# Patient Record
Sex: MALE | Race: OTHER | HISPANIC OR LATINO
[De-identification: names, ages, dates, MRNs, and addresses within clinical notes are randomized per-mention and may not be internally consistent; named-entity substitution may affect disease eponyms.]

---

## 2019-08-19 PROBLEM — Z00.00 ENCOUNTER FOR PREVENTIVE HEALTH EXAMINATION: Status: ACTIVE | Noted: 2019-08-19

## 2019-08-23 ENCOUNTER — APPOINTMENT (OUTPATIENT)
Dept: HEART AND VASCULAR | Facility: CLINIC | Age: 74
End: 2019-08-23
Payer: MEDICARE

## 2019-08-23 ENCOUNTER — LABORATORY RESULT (OUTPATIENT)
Age: 74
End: 2019-08-23

## 2019-08-23 VITALS
HEIGHT: 66 IN | OXYGEN SATURATION: 97 % | TEMPERATURE: 97.8 F | DIASTOLIC BLOOD PRESSURE: 64 MMHG | SYSTOLIC BLOOD PRESSURE: 120 MMHG | WEIGHT: 160.19 LBS | HEART RATE: 70 BPM | BODY MASS INDEX: 25.74 KG/M2

## 2019-08-23 DIAGNOSIS — Z82.49 FAMILY HISTORY OF ISCHEMIC HEART DISEASE AND OTHER DISEASES OF THE CIRCULATORY SYSTEM: ICD-10-CM

## 2019-08-23 DIAGNOSIS — Z80.9 FAMILY HISTORY OF MALIGNANT NEOPLASM, UNSPECIFIED: ICD-10-CM

## 2019-08-23 DIAGNOSIS — Z83.3 FAMILY HISTORY OF DIABETES MELLITUS: ICD-10-CM

## 2019-08-23 DIAGNOSIS — Z83.49 FAMILY HISTORY OF OTHER ENDOCRINE, NUTRITIONAL AND METABOLIC DISEASES: ICD-10-CM

## 2019-08-23 DIAGNOSIS — Z82.3 FAMILY HISTORY OF STROKE: ICD-10-CM

## 2019-08-23 PROCEDURE — 93000 ELECTROCARDIOGRAM COMPLETE: CPT

## 2019-08-23 PROCEDURE — 93306 TTE W/DOPPLER COMPLETE: CPT

## 2019-08-23 PROCEDURE — 36415 COLL VENOUS BLD VENIPUNCTURE: CPT

## 2019-08-23 PROCEDURE — 99204 OFFICE O/P NEW MOD 45 MIN: CPT

## 2019-08-23 NOTE — PHYSICAL EXAM
[General Appearance - Well Developed] : well developed [Normal Appearance] : normal appearance [Well Groomed] : well groomed [General Appearance - Well Nourished] : well nourished [No Deformities] : no deformities [General Appearance - In No Acute Distress] : no acute distress [Normal Conjunctiva] : the conjunctiva exhibited no abnormalities [Eyelids - No Xanthelasma] : the eyelids demonstrated no xanthelasmas [Normal Oral Mucosa] : normal oral mucosa [No Oral Pallor] : no oral pallor [No Oral Cyanosis] : no oral cyanosis [Normal Jugular Venous A Waves Present] : normal jugular venous A waves present [Normal Jugular Venous V Waves Present] : normal jugular venous V waves present [No Jugular Venous Cummins A Waves] : no jugular venous cummins A waves [Heart Rate And Rhythm] : heart rate and rhythm were normal [Murmurs] : no murmurs present [Heart Sounds] : normal S1 and S2 [Respiration, Rhythm And Depth] : normal respiratory rhythm and effort [Exaggerated Use Of Accessory Muscles For Inspiration] : no accessory muscle use [Auscultation Breath Sounds / Voice Sounds] : lungs were clear to auscultation bilaterally [Abdomen Soft] : soft [Abdomen Tenderness] : non-tender [Abdomen Mass (___ Cm)] : no abdominal mass palpated [Abnormal Walk] : normal gait [Nail Clubbing] : no clubbing of the fingernails [Gait - Sufficient For Exercise Testing] : the gait was sufficient for exercise testing [Cyanosis, Localized] : no localized cyanosis [Petechial Hemorrhages (___cm)] : no petechial hemorrhages [Skin Color & Pigmentation] : normal skin color and pigmentation [] : no rash [No Venous Stasis] : no venous stasis [Skin Lesions] : no skin lesions [No Skin Ulcers] : no skin ulcer [No Xanthoma] : no  xanthoma was observed [Mood] : the mood was normal [Affect] : the affect was normal [Oriented To Time, Place, And Person] : oriented to person, place, and time [No Anxiety] : not feeling anxious

## 2019-08-26 LAB
ALBUMIN SERPL ELPH-MCNC: 4.6 G/DL
ALP BLD-CCNC: 64 U/L
ALT SERPL-CCNC: 20 U/L
ANION GAP SERPL CALC-SCNC: 13 MMOL/L
AST SERPL-CCNC: 22 U/L
BASOPHILS # BLD AUTO: 0.05 K/UL
BASOPHILS NFR BLD AUTO: 0.5 %
BILIRUB SERPL-MCNC: 0.2 MG/DL
BUN SERPL-MCNC: 17 MG/DL
CALCIUM SERPL-MCNC: 10 MG/DL
CHLORIDE SERPL-SCNC: 102 MMOL/L
CHOLEST SERPL-MCNC: 200 MG/DL
CHOLEST/HDLC SERPL: 4.7 RATIO
CO2 SERPL-SCNC: 24 MMOL/L
CREAT SERPL-MCNC: 1.05 MG/DL
EOSINOPHIL # BLD AUTO: 0.1 K/UL
EOSINOPHIL NFR BLD AUTO: 1 %
ESTIMATED AVERAGE GLUCOSE: 177 MG/DL
GLUCOSE SERPL-MCNC: 125 MG/DL
HBA1C MFR BLD HPLC: 7.8 %
HCT VFR BLD CALC: 48.8 %
HDLC SERPL-MCNC: 43 MG/DL
HGB BLD-MCNC: 15.6 G/DL
IMM GRANULOCYTES NFR BLD AUTO: 0.3 %
LDLC SERPL CALC-MCNC: 123 MG/DL
LYMPHOCYTES # BLD AUTO: 2.64 K/UL
LYMPHOCYTES NFR BLD AUTO: 27.4 %
MAN DIFF?: NORMAL
MCHC RBC-ENTMCNC: 31.5 PG
MCHC RBC-ENTMCNC: 32 GM/DL
MCV RBC AUTO: 98.6 FL
MONOCYTES # BLD AUTO: 0.79 K/UL
MONOCYTES NFR BLD AUTO: 8.2 %
NEUTROPHILS # BLD AUTO: 6.01 K/UL
NEUTROPHILS NFR BLD AUTO: 62.6 %
PLATELET # BLD AUTO: 263 K/UL
POTASSIUM SERPL-SCNC: 4.5 MMOL/L
PROT SERPL-MCNC: 7.8 G/DL
RBC # BLD: 4.95 M/UL
RBC # FLD: 13.3 %
SODIUM SERPL-SCNC: 139 MMOL/L
TRIGL SERPL-MCNC: 170 MG/DL
WBC # FLD AUTO: 9.62 K/UL

## 2019-08-26 NOTE — HISTORY OF PRESENT ILLNESS
[FreeTextEntry1] : 73 M referred by Dr. Alvarez a week and a half ago was on the bus started sweating then started experiencing indigestion felt better with moon water got home took laura mustafa felt better but since then feels better went to play half court basketball does not feel as good as he usually does had to stop after 15 minutes felt tired \par \par ecg nsr inferior infarct age indeterminate

## 2019-08-26 NOTE — ASSESSMENT
[FreeTextEntry1] : irwin had a recent STEMI evolving inferior wall MI echo done today EF 40% with inferior hypokinesis\par start asa statin plavix low dose metoprolol\par schedule for cath asap \par fu next week

## 2019-08-30 VITALS
DIASTOLIC BLOOD PRESSURE: 81 MMHG | TEMPERATURE: 98 F | OXYGEN SATURATION: 99 % | HEART RATE: 65 BPM | RESPIRATION RATE: 18 BRPM | SYSTOLIC BLOOD PRESSURE: 151 MMHG

## 2019-08-30 RX ORDER — CHLORHEXIDINE GLUCONATE 213 G/1000ML
1 SOLUTION TOPICAL ONCE
Refills: 0 | Status: COMPLETED | OUTPATIENT
Start: 2019-09-04 | End: 2019-09-06

## 2019-08-30 NOTE — H&P ADULT - ADDITIONAL PE
ASA: II  Mallampati : II   12 Lead EKG penidng ASA: II  Mallampati : II   12 Lead EKG: Sinus Bradycardia @ 59 BPM, Left Axis Deviation, TWI III, II, V5, V6

## 2019-08-30 NOTE — H&P ADULT - NSHPSOCIALHISTORY_GEN_ALL_CORE
former smoker; quit 1970s; smoked 1-2 PPD X 24 years  Former drug use; quit 1970s; used marijuana/cocaine/heroine X 5- 10 years  Former ETOH use.

## 2019-08-30 NOTE — H&P ADULT - NSICDXPASTSURGICALHX_GEN_ALL_CORE_FT
PAST SURGICAL HISTORY:  H/O shoulder surgery B/L    H/O total knee replacement, right     S/P cataract surgery

## 2019-08-30 NOTE — H&P ADULT - ASSESSMENT
74 y/o M FORMER SMOKER/FORMER DRUG USE/FORMER ETOH USE with FHx of CAD (MI mother: age 80s) PMH of HTN, DM, asthma (denies any hospitalization/intubation) presents for cardiac catheterization with possible intervention if clinically indicated due to pts risk factors, CCS Angina Class 4 Sx, abnormal EKG and ECHO , pt is referred for cardiac catheterization w/ possible intervention.       Risks & benefits of procedure and alternative therapy have been explained to the patient including but not limited to: allergic reaction, bleeding w/possible need for blood transfusion, infection, renal and vascular compromise, limb damage, arrhythmia, stroke, vessel dissection/perforation, Myocardial infarction, emergent CABG. Informed consent obtained and in chart. 74 y/o M FORMER SMOKER/FORMER DRUG USE/FORMER ETOH USE with FHx of CAD (MI mother: age 80s) PMH of HTN, DM, asthma (denies any hospitalization/intubation) presents for cardiac catheterization with possible intervention if clinically indicated due to pts risk factors, CCS Angina Class 4 Sx, abnormal EKG and ECHO , pt is referred for cardiac catheterization w/ possible intervention.     -Pt took ASA 81 mg this AM and Plavix 75 mg this AM. Has remained compliant with regimen since prescription 8/23/2019.  -NS @ 50 cc/hour.     Risks & benefits of procedure and alternative therapy have been explained to the patient including but not limited to: allergic reaction, bleeding w/possible need for blood transfusion, infection, renal and vascular compromise, limb damage, arrhythmia, stroke, vessel dissection/perforation, Myocardial infarction, emergent CABG. Informed consent obtained and in chart.

## 2019-08-30 NOTE — H&P ADULT - ATTENDING COMMENTS
See PA note written above, for details. I reviewed the PA documentation.  I reviewed vitals, labs, medications, cardiac studies and additional imaging.  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 CTSx consulted. Plan for CABG  Pre op work up ordered  Lisinopril dc'd sunil op  cont BB and high intensity statin  HÉCTOR Martin.  Cardiology Attending

## 2019-08-30 NOTE — H&P ADULT - HISTORY OF PRESENT ILLNESS
SDA Skeleton  *Confirm Medications on Arrival    74 y/o M with PMH of HTN, DM, CVA (.. residual deficits), ? asthma (.. hospitalization/intubation);   who was referred to cardiologist Dr. Santos by PCP for abnormal ECG. Pt. reports feeling…   Pt denies … CP, SOB, dizziness, diaphoresis, palpitations, fatigue, LE edema, orthopnea, PND, syncope.  Subsequently; Pt had a ECHO 08/23/19 revealing EF 40-45 %; basal mid and distal inferior wall hypokinesis. EKG as per MD note revealed inferior infarction.  Due to pts risk factors, CCS Angina Class _ Sx, abnormal EKG and ECHO , pt is referred for cardiac catheterization w/ possible intervention. *Confirm Medications on Arrival    74 y/o M FORMER SMOKER/FORMER DRUG USE/FORMER ETOH USE with FHx of CAD (MI mother: age 80s) PMH of HTN, DM, asthma (denies any hospitalization/intubation);  who was referred to cardiologist Dr. Santos by PCP for abnormal ECG.  Pt. reports that 2 weeks ago; he was in the bus with his wife when he started to sweat profusely; reports of having mid-sternal; non-radiating chest tightness describes it as pulling; he thought he had indigestion and reports feeling better after having seltzer water. His wife was concerned; thus they went to see his PCP who did a EKG and referred the pt. to Dr. Santos; Pt. reports that was the only episode he had so far; denies any CP and SOB at baseline, dizziness, diaphoresis, palpitations, fatigue, LE edema, orthopnea, PND, syncope. He reports that he is very active and works 5 days/week. Subsequently; Pt had a ECHO 08/23/19 revealing EF 40-45 %; basal mid and distal inferior wall hypokinesis. EKG as per MD note revealed inferior infarction.  Due to pts risk factors, CCS Angina Class 4 Sx, abnormal EKG and ECHO , pt is referred for cardiac catheterization w/ possible intervention.   Of note; pt. was initially started on Metoprolol 25 mg QD; Plavix 75 mg daily (first dose 8/23/19) and Atorvastatin 80 mg once a day by DR. Santos last week in the office; 72 y/o M FORMER SMOKER/FORMER DRUG USE/FORMER ETOH USE with FHx of CAD (MI mother: age 80s) PMH of HTN, DM, asthma (denies any hospitalization/intubation);  who was referred to cardiologist Dr. Santos by PCP for abnormal ECG.  Pt. reports that 2 weeks ago; he was in the bus with his wife when he started to sweat profusely; reports of having mid-sternal; non-radiating chest tightness describes it as pulling; he thought he had indigestion and reports feeling better after having seltzer water. His wife was concerned; thus they went to see his PCP who did a EKG and referred the pt. to Dr. Santos; Pt. reports that was the only episode he had so far; denies any CP and SOB at baseline, dizziness, diaphoresis, palpitations, fatigue, LE edema, orthopnea, PND, syncope. He reports that he is very active and works 5 days/week. Subsequently; Pt had a ECHO 08/23/19 revealing EF 40-45 %; basal mid and distal inferior wall hypokinesis. EKG as per MD note revealed inferior infarction.  Due to pts risk factors, CCS Angina Class 4 Sx, abnormal EKG and ECHO , pt is referred for cardiac catheterization w/ possible intervention.   Of note; pt. was initially started on Metoprolol 25 mg QD; Plavix 75 mg daily (first dose 8/23/19) and Atorvastatin 80 mg once a day by DR. Santos last week in the office;

## 2019-09-04 ENCOUNTER — INPATIENT (INPATIENT)
Facility: HOSPITAL | Age: 74
LOS: 6 days | Discharge: HOME HEALTH | DRG: 233 | End: 2019-09-11
Attending: THORACIC SURGERY (CARDIOTHORACIC VASCULAR SURGERY) | Admitting: THORACIC SURGERY (CARDIOTHORACIC VASCULAR SURGERY)
Payer: MEDICARE

## 2019-09-04 DIAGNOSIS — Z98.890 OTHER SPECIFIED POSTPROCEDURAL STATES: Chronic | ICD-10-CM

## 2019-09-04 DIAGNOSIS — Z96.651 PRESENCE OF RIGHT ARTIFICIAL KNEE JOINT: Chronic | ICD-10-CM

## 2019-09-04 DIAGNOSIS — Z98.49 CATARACT EXTRACTION STATUS, UNSPECIFIED EYE: Chronic | ICD-10-CM

## 2019-09-04 DIAGNOSIS — I21.4 NON-ST ELEVATION (NSTEMI) MYOCARDIAL INFARCTION: ICD-10-CM

## 2019-09-04 LAB
ALBUMIN SERPL ELPH-MCNC: 4.1 G/DL — SIGNIFICANT CHANGE UP (ref 3.3–5)
ALP SERPL-CCNC: 63 U/L — SIGNIFICANT CHANGE UP (ref 40–120)
ALT FLD-CCNC: 16 U/L — SIGNIFICANT CHANGE UP (ref 10–45)
ANION GAP SERPL CALC-SCNC: 12 MMOL/L — SIGNIFICANT CHANGE UP (ref 5–17)
ANION GAP SERPL CALC-SCNC: 15 MMOL/L — SIGNIFICANT CHANGE UP (ref 5–17)
APTT BLD: 32.5 SEC — SIGNIFICANT CHANGE UP (ref 27.5–36.3)
AST SERPL-CCNC: 26 U/L — SIGNIFICANT CHANGE UP (ref 10–40)
BASOPHILS # BLD AUTO: 0.02 K/UL — SIGNIFICANT CHANGE UP (ref 0–0.2)
BASOPHILS NFR BLD AUTO: 0.3 % — SIGNIFICANT CHANGE UP (ref 0–2)
BILIRUB SERPL-MCNC: 0.5 MG/DL — SIGNIFICANT CHANGE UP (ref 0.2–1.2)
BUN SERPL-MCNC: 13 MG/DL — SIGNIFICANT CHANGE UP (ref 7–23)
BUN SERPL-MCNC: 14 MG/DL — SIGNIFICANT CHANGE UP (ref 7–23)
CALCIUM SERPL-MCNC: 9.5 MG/DL — SIGNIFICANT CHANGE UP (ref 8.4–10.5)
CALCIUM SERPL-MCNC: 9.8 MG/DL — SIGNIFICANT CHANGE UP (ref 8.4–10.5)
CHLORIDE SERPL-SCNC: 104 MMOL/L — SIGNIFICANT CHANGE UP (ref 96–108)
CHLORIDE SERPL-SCNC: 106 MMOL/L — SIGNIFICANT CHANGE UP (ref 96–108)
CHOLEST SERPL-MCNC: 99 MG/DL — SIGNIFICANT CHANGE UP (ref 10–199)
CK MB CFR SERPL CALC: 2.6 NG/ML — SIGNIFICANT CHANGE UP (ref 0–6.7)
CK SERPL-CCNC: 158 U/L — SIGNIFICANT CHANGE UP (ref 30–200)
CO2 SERPL-SCNC: 22 MMOL/L — SIGNIFICANT CHANGE UP (ref 22–31)
CO2 SERPL-SCNC: 25 MMOL/L — SIGNIFICANT CHANGE UP (ref 22–31)
CREAT SERPL-MCNC: 0.89 MG/DL — SIGNIFICANT CHANGE UP (ref 0.5–1.3)
CREAT SERPL-MCNC: 1.03 MG/DL — SIGNIFICANT CHANGE UP (ref 0.5–1.3)
CRP SERPL-MCNC: 0.8 MG/DL — HIGH (ref 0–0.4)
EOSINOPHIL # BLD AUTO: 0.07 K/UL — SIGNIFICANT CHANGE UP (ref 0–0.5)
EOSINOPHIL NFR BLD AUTO: 0.9 % — SIGNIFICANT CHANGE UP (ref 0–6)
GLUCOSE SERPL-MCNC: 109 MG/DL — HIGH (ref 70–99)
GLUCOSE SERPL-MCNC: 114 MG/DL — HIGH (ref 70–99)
HBA1C BLD-MCNC: 7.3 % — HIGH (ref 4–5.6)
HCT VFR BLD CALC: 42.5 % — SIGNIFICANT CHANGE UP (ref 39–50)
HCT VFR BLD CALC: 46.1 % — SIGNIFICANT CHANGE UP (ref 39–50)
HDLC SERPL-MCNC: 41 MG/DL — SIGNIFICANT CHANGE UP
HGB BLD-MCNC: 14.1 G/DL — SIGNIFICANT CHANGE UP (ref 13–17)
HGB BLD-MCNC: 15.2 G/DL — SIGNIFICANT CHANGE UP (ref 13–17)
IMM GRANULOCYTES NFR BLD AUTO: 0.3 % — SIGNIFICANT CHANGE UP (ref 0–1.5)
INR BLD: 1.2 — HIGH (ref 0.88–1.16)
LIPID PNL WITH DIRECT LDL SERPL: 45 MG/DL — SIGNIFICANT CHANGE UP
LYMPHOCYTES # BLD AUTO: 2.08 K/UL — SIGNIFICANT CHANGE UP (ref 1–3.3)
LYMPHOCYTES # BLD AUTO: 26.7 % — SIGNIFICANT CHANGE UP (ref 13–44)
MAGNESIUM SERPL-MCNC: 1.9 MG/DL — SIGNIFICANT CHANGE UP (ref 1.6–2.6)
MCHC RBC-ENTMCNC: 32 PG — SIGNIFICANT CHANGE UP (ref 27–34)
MCHC RBC-ENTMCNC: 32.1 PG — SIGNIFICANT CHANGE UP (ref 27–34)
MCHC RBC-ENTMCNC: 33 GM/DL — SIGNIFICANT CHANGE UP (ref 32–36)
MCHC RBC-ENTMCNC: 33.2 GM/DL — SIGNIFICANT CHANGE UP (ref 32–36)
MCV RBC AUTO: 96.6 FL — SIGNIFICANT CHANGE UP (ref 80–100)
MCV RBC AUTO: 97.5 FL — SIGNIFICANT CHANGE UP (ref 80–100)
MONOCYTES # BLD AUTO: 0.71 K/UL — SIGNIFICANT CHANGE UP (ref 0–0.9)
MONOCYTES NFR BLD AUTO: 9.1 % — SIGNIFICANT CHANGE UP (ref 2–14)
NEUTROPHILS # BLD AUTO: 4.88 K/UL — SIGNIFICANT CHANGE UP (ref 1.8–7.4)
NEUTROPHILS NFR BLD AUTO: 62.7 % — SIGNIFICANT CHANGE UP (ref 43–77)
NRBC # BLD: 0 /100 WBCS — SIGNIFICANT CHANGE UP (ref 0–0)
NRBC # BLD: 0 /100 WBCS — SIGNIFICANT CHANGE UP (ref 0–0)
PLATELET # BLD AUTO: 169 K/UL — SIGNIFICANT CHANGE UP (ref 150–400)
PLATELET # BLD AUTO: 179 K/UL — SIGNIFICANT CHANGE UP (ref 150–400)
POTASSIUM SERPL-MCNC: 4.2 MMOL/L — SIGNIFICANT CHANGE UP (ref 3.5–5.3)
POTASSIUM SERPL-MCNC: 4.3 MMOL/L — SIGNIFICANT CHANGE UP (ref 3.5–5.3)
POTASSIUM SERPL-SCNC: 4.2 MMOL/L — SIGNIFICANT CHANGE UP (ref 3.5–5.3)
POTASSIUM SERPL-SCNC: 4.3 MMOL/L — SIGNIFICANT CHANGE UP (ref 3.5–5.3)
PROT SERPL-MCNC: 8.2 G/DL — SIGNIFICANT CHANGE UP (ref 6–8.3)
PROTHROM AB SERPL-ACNC: 13.6 SEC — HIGH (ref 10–12.9)
RBC # BLD: 4.4 M/UL — SIGNIFICANT CHANGE UP (ref 4.2–5.8)
RBC # BLD: 4.73 M/UL — SIGNIFICANT CHANGE UP (ref 4.2–5.8)
RBC # FLD: 12.4 % — SIGNIFICANT CHANGE UP (ref 10.3–14.5)
RBC # FLD: 12.5 % — SIGNIFICANT CHANGE UP (ref 10.3–14.5)
SODIUM SERPL-SCNC: 141 MMOL/L — SIGNIFICANT CHANGE UP (ref 135–145)
SODIUM SERPL-SCNC: 143 MMOL/L — SIGNIFICANT CHANGE UP (ref 135–145)
TOTAL CHOLESTEROL/HDL RATIO MEASUREMENT: 2.4 RATIO — LOW (ref 3.4–9.6)
TRIGL SERPL-MCNC: 66 MG/DL — SIGNIFICANT CHANGE UP (ref 10–149)
WBC # BLD: 7.78 K/UL — SIGNIFICANT CHANGE UP (ref 3.8–10.5)
WBC # BLD: 8.59 K/UL — SIGNIFICANT CHANGE UP (ref 3.8–10.5)
WBC # FLD AUTO: 7.78 K/UL — SIGNIFICANT CHANGE UP (ref 3.8–10.5)
WBC # FLD AUTO: 8.59 K/UL — SIGNIFICANT CHANGE UP (ref 3.8–10.5)

## 2019-09-04 PROCEDURE — 93458 L HRT ARTERY/VENTRICLE ANGIO: CPT | Mod: 26

## 2019-09-04 RX ORDER — SODIUM CHLORIDE 9 MG/ML
1000 INJECTION INTRAMUSCULAR; INTRAVENOUS; SUBCUTANEOUS
Refills: 0 | Status: DISCONTINUED | OUTPATIENT
Start: 2019-09-04 | End: 2019-09-06

## 2019-09-04 RX ORDER — ASPIRIN/CALCIUM CARB/MAGNESIUM 324 MG
81 TABLET ORAL DAILY
Refills: 0 | Status: DISCONTINUED | OUTPATIENT
Start: 2019-09-05 | End: 2019-09-06

## 2019-09-04 RX ORDER — DEXTROSE 50 % IN WATER 50 %
25 SYRINGE (ML) INTRAVENOUS ONCE
Refills: 0 | Status: DISCONTINUED | OUTPATIENT
Start: 2019-09-04 | End: 2019-09-06

## 2019-09-04 RX ORDER — ATORVASTATIN CALCIUM 80 MG/1
80 TABLET, FILM COATED ORAL AT BEDTIME
Refills: 0 | Status: DISCONTINUED | OUTPATIENT
Start: 2019-09-04 | End: 2019-09-06

## 2019-09-04 RX ORDER — SODIUM CHLORIDE 9 MG/ML
500 INJECTION INTRAMUSCULAR; INTRAVENOUS; SUBCUTANEOUS
Refills: 0 | Status: DISCONTINUED | OUTPATIENT
Start: 2019-09-04 | End: 2019-09-04

## 2019-09-04 RX ORDER — DEXTROSE 50 % IN WATER 50 %
12.5 SYRINGE (ML) INTRAVENOUS ONCE
Refills: 0 | Status: DISCONTINUED | OUTPATIENT
Start: 2019-09-04 | End: 2019-09-06

## 2019-09-04 RX ORDER — SOD,AMMONIUM,POTASSIUM LACTATE
1 CREAM (GRAM) TOPICAL
Qty: 0 | Refills: 0 | DISCHARGE

## 2019-09-04 RX ORDER — INSULIN LISPRO 100/ML
VIAL (ML) SUBCUTANEOUS
Refills: 0 | Status: DISCONTINUED | OUTPATIENT
Start: 2019-09-04 | End: 2019-09-06

## 2019-09-04 RX ORDER — SODIUM CHLORIDE 9 MG/ML
1000 INJECTION, SOLUTION INTRAVENOUS
Refills: 0 | Status: DISCONTINUED | OUTPATIENT
Start: 2019-09-04 | End: 2019-09-06

## 2019-09-04 RX ORDER — ALBUTEROL 90 UG/1
2 AEROSOL, METERED ORAL
Qty: 0 | Refills: 0 | DISCHARGE

## 2019-09-04 RX ORDER — CLOPIDOGREL BISULFATE 75 MG/1
75 TABLET, FILM COATED ORAL DAILY
Refills: 0 | Status: DISCONTINUED | OUTPATIENT
Start: 2019-09-05 | End: 2019-09-05

## 2019-09-04 RX ORDER — FLUTICASONE PROPIONATE 50 MCG
1 SPRAY, SUSPENSION NASAL
Qty: 0 | Refills: 0 | DISCHARGE

## 2019-09-04 RX ORDER — SOD,AMMONIUM,POTASSIUM LACTATE
1 CREAM (GRAM) TOPICAL
Refills: 0 | Status: DISCONTINUED | OUTPATIENT
Start: 2019-09-04 | End: 2019-09-06

## 2019-09-04 RX ORDER — FLUTICASONE PROPIONATE 50 MCG
1 SPRAY, SUSPENSION NASAL DAILY
Refills: 0 | Status: DISCONTINUED | OUTPATIENT
Start: 2019-09-04 | End: 2019-09-06

## 2019-09-04 RX ORDER — LISINOPRIL 2.5 MG/1
2.5 TABLET ORAL DAILY
Refills: 0 | Status: DISCONTINUED | OUTPATIENT
Start: 2019-09-05 | End: 2019-09-05

## 2019-09-04 RX ORDER — DEXTROSE 50 % IN WATER 50 %
15 SYRINGE (ML) INTRAVENOUS ONCE
Refills: 0 | Status: DISCONTINUED | OUTPATIENT
Start: 2019-09-04 | End: 2019-09-06

## 2019-09-04 RX ORDER — METOPROLOL TARTRATE 50 MG
25 TABLET ORAL DAILY
Refills: 0 | Status: DISCONTINUED | OUTPATIENT
Start: 2019-09-04 | End: 2019-09-06

## 2019-09-04 RX ORDER — GLUCAGON INJECTION, SOLUTION 0.5 MG/.1ML
1 INJECTION, SOLUTION SUBCUTANEOUS ONCE
Refills: 0 | Status: DISCONTINUED | OUTPATIENT
Start: 2019-09-04 | End: 2019-09-06

## 2019-09-04 RX ORDER — ALBUTEROL 90 UG/1
2 AEROSOL, METERED ORAL EVERY 6 HOURS
Refills: 0 | Status: DISCONTINUED | OUTPATIENT
Start: 2019-09-04 | End: 2019-09-06

## 2019-09-04 RX ADMIN — SODIUM CHLORIDE 50 MILLILITER(S): 9 INJECTION INTRAMUSCULAR; INTRAVENOUS; SUBCUTANEOUS at 16:55

## 2019-09-04 RX ADMIN — ATORVASTATIN CALCIUM 80 MILLIGRAM(S): 80 TABLET, FILM COATED ORAL at 21:51

## 2019-09-04 RX ADMIN — Medication 1 APPLICATION(S): at 21:51

## 2019-09-04 RX ADMIN — Medication 2: at 22:07

## 2019-09-05 DIAGNOSIS — I10 ESSENTIAL (PRIMARY) HYPERTENSION: ICD-10-CM

## 2019-09-05 DIAGNOSIS — E11.9 TYPE 2 DIABETES MELLITUS WITHOUT COMPLICATIONS: ICD-10-CM

## 2019-09-05 DIAGNOSIS — R63.8 OTHER SYMPTOMS AND SIGNS CONCERNING FOOD AND FLUID INTAKE: ICD-10-CM

## 2019-09-05 DIAGNOSIS — I25.10 ATHEROSCLEROTIC HEART DISEASE OF NATIVE CORONARY ARTERY WITHOUT ANGINA PECTORIS: ICD-10-CM

## 2019-09-05 DIAGNOSIS — J45.909 UNSPECIFIED ASTHMA, UNCOMPLICATED: ICD-10-CM

## 2019-09-05 LAB
APPEARANCE UR: CLEAR — SIGNIFICANT CHANGE UP
BILIRUB UR-MCNC: NEGATIVE — SIGNIFICANT CHANGE UP
BLD GP AB SCN SERPL QL: NEGATIVE — SIGNIFICANT CHANGE UP
BLD GP AB SCN SERPL QL: NEGATIVE — SIGNIFICANT CHANGE UP
COLOR SPEC: YELLOW — SIGNIFICANT CHANGE UP
DIFF PNL FLD: ABNORMAL
GLUCOSE UR QL: NEGATIVE — SIGNIFICANT CHANGE UP
KETONES UR-MCNC: NEGATIVE — SIGNIFICANT CHANGE UP
LEUKOCYTE ESTERASE UR-ACNC: NEGATIVE — SIGNIFICANT CHANGE UP
NITRITE UR-MCNC: NEGATIVE — SIGNIFICANT CHANGE UP
PCP SPEC-MCNC: SIGNIFICANT CHANGE UP
PH UR: 5.5 — SIGNIFICANT CHANGE UP (ref 5–8)
PROT UR-MCNC: NEGATIVE MG/DL — SIGNIFICANT CHANGE UP
RH IG SCN BLD-IMP: POSITIVE — SIGNIFICANT CHANGE UP
RH IG SCN BLD-IMP: POSITIVE — SIGNIFICANT CHANGE UP
SP GR SPEC: <=1.005 — SIGNIFICANT CHANGE UP (ref 1–1.03)
UROBILINOGEN FLD QL: 0.2 E.U./DL — SIGNIFICANT CHANGE UP

## 2019-09-05 PROCEDURE — 99223 1ST HOSP IP/OBS HIGH 75: CPT

## 2019-09-05 PROCEDURE — 99222 1ST HOSP IP/OBS MODERATE 55: CPT

## 2019-09-05 PROCEDURE — 71045 X-RAY EXAM CHEST 1 VIEW: CPT | Mod: 26

## 2019-09-05 PROCEDURE — 94010 BREATHING CAPACITY TEST: CPT | Mod: 26

## 2019-09-05 PROCEDURE — 93010 ELECTROCARDIOGRAM REPORT: CPT

## 2019-09-05 PROCEDURE — 93880 EXTRACRANIAL BILAT STUDY: CPT | Mod: 26

## 2019-09-05 RX ORDER — CHLORHEXIDINE GLUCONATE 213 G/1000ML
1 SOLUTION TOPICAL ONCE
Refills: 0 | Status: COMPLETED | OUTPATIENT
Start: 2019-09-05 | End: 2019-09-05

## 2019-09-05 RX ORDER — DEXTROSE 50 % IN WATER 50 %
25 SYRINGE (ML) INTRAVENOUS ONCE
Refills: 0 | Status: COMPLETED | OUTPATIENT
Start: 2019-09-05 | End: 2019-09-05

## 2019-09-05 RX ORDER — CHLORHEXIDINE GLUCONATE 213 G/1000ML
30 SOLUTION TOPICAL ONCE
Refills: 0 | Status: COMPLETED | OUTPATIENT
Start: 2019-09-05 | End: 2019-09-06

## 2019-09-05 RX ADMIN — Medication 2: at 22:07

## 2019-09-05 RX ADMIN — Medication 25 MILLIGRAM(S): at 06:06

## 2019-09-05 RX ADMIN — Medication 2: at 11:51

## 2019-09-05 RX ADMIN — Medication 1 APPLICATION(S): at 11:50

## 2019-09-05 RX ADMIN — CLOPIDOGREL BISULFATE 75 MILLIGRAM(S): 75 TABLET, FILM COATED ORAL at 11:49

## 2019-09-05 RX ADMIN — ATORVASTATIN CALCIUM 80 MILLIGRAM(S): 80 TABLET, FILM COATED ORAL at 22:07

## 2019-09-05 RX ADMIN — Medication 81 MILLIGRAM(S): at 11:49

## 2019-09-05 RX ADMIN — Medication 25 MILLILITER(S): at 07:14

## 2019-09-05 RX ADMIN — CHLORHEXIDINE GLUCONATE 1 APPLICATION(S): 213 SOLUTION TOPICAL at 20:30

## 2019-09-05 RX ADMIN — Medication 1 APPLICATION(S): at 22:07

## 2019-09-05 NOTE — PROGRESS NOTE ADULT - PROBLEM SELECTOR PLAN 1
Presented to outpatient physician for episode of chest pressure/discomfort. Abnormal EKG and was referred to Dr. Santos who did an echo showing an EF of 45%. Subsequently was sent to [facility] for cardiac catheterization which showed:  2VD, mild LV hypokinesis, EF 50%, 90% dRCA, 70% RPDA, 30-50% dLM, 90% oLAD, 80% mLAD, 30-50% pLCx. Right radial band.   - CT surgery consulted - recommending CABG   - Planning for CABG tomorrow   - Holding plavix and lisinopril in anticipation for OR tomorrow   - C/w ASA and Toprol 25mg XL  - Pending doppler u/s carotids, PFTs and CXR  - AM EKG

## 2019-09-05 NOTE — PROGRESS NOTE ADULT - ASSESSMENT
72 y/o M FORMER SMOKER/FORMER DRUG USE/FORMER ETOH USE with FHx of CAD (MI mother: age 80s) PMH of HTN, DM, asthma (denies any hospitalization/intubation) presents for cardiac catheterization with possible intervention if clinically indicated due to pts risk factors, CCS Angina Class 4 Sx, abnormal EKG and ECHO , pt is referred for cardiac catheterization w/ possible intervention.     -Pt took ASA 81 mg this AM and Plavix 75 mg this AM. Has remained compliant with regimen since prescription 8/23/2019.  -NS @ 50 cc/hour.     Risks & benefits of procedure and alternative therapy have been explained to the patient including but not limited to: allergic reaction, bleeding w/possible need for blood transfusion, infection, renal and vascular compromise, limb damage, arrhythmia, stroke, vessel dissection/perforation, Myocardial infarction, emergent CABG. Informed consent obtained and in chart.

## 2019-09-05 NOTE — PROGRESS NOTE ADULT - SUBJECTIVE AND OBJECTIVE BOX
Surgeon:    Requesting Physician:    HISTORY OF PRESENT ILLNESS (Need 4):  73y Male  72 y/o M FORMER SMOKER/FORMER DRUG USE/FORMER ETOH USE with FHx of CAD (MI mother: age 80s) PMH of HTN, DM, asthma (denies any hospitalization/intubation);  who was referred to cardiologist Dr. Santos by PCP for abnormal ECG.  Pt. reports that 2 weeks ago; he was in the bus with his wife when he started to sweat profusely; reports of having mid-sternal; non-radiating chest tightness describes it as pulling; he thought he had indigestion and reports feeling better after having seltzer water. His wife was concerned; thus they went to see his PCP who did a EKG and referred the pt. to Dr. Santos; Pt. reports that was the only episode he had so far; denies any CP and SOB at baseline, dizziness, diaphoresis, palpitations, fatigue, LE edema, orthopnea, PND, syncope. He reports that he is very active and works 5 days/week. Subsequently; Pt had a ECHO 08/23/19 revealing EF 40-45 %; basal mid and distal inferior wall hypokinesis. EKG as per MD note revealed inferior infarction.  Due to pts risk factors, CCS Angina Class 4 Sx, abnormal EKG and ECHO , pt is referred for cardiac catheterization w/ possible intervention.   Of note; pt. was initially started on Metoprolol 25 mg QD; Plavix 75 mg daily (first dose 8/23/19) and Atorvastatin 80 mg once a day by DR. Santos last week in the office;    PAST MEDICAL & SURGICAL HISTORY:  S/P cataract surgery  H/O shoulder surgery: B/L  H/O total knee replacement, right      MEDICATIONS  (STANDING):  ammonium lactate 12% Lotion 1 Application(s) Topical two times a day  aspirin enteric coated 81 milliGRAM(s) Oral daily  atorvastatin 80 milliGRAM(s) Oral at bedtime  chlorhexidine 4% Liquid 1 Application(s) Topical once  clopidogrel Tablet 75 milliGRAM(s) Oral daily  dextrose 5%. 1000 milliLiter(s) (50 mL/Hr) IV Continuous <Continuous>  dextrose 50% Injectable 12.5 Gram(s) IV Push once  dextrose 50% Injectable 25 Gram(s) IV Push once  dextrose 50% Injectable 25 Gram(s) IV Push once  fluticasone propionate 50 MICROgram(s)/spray Nasal Spray 1 Spray(s) Both Nostrils daily  insulin lispro (HumaLOG) corrective regimen sliding scale   SubCutaneous Before meals and at bedtime  lisinopril 2.5 milliGRAM(s) Oral daily  metoprolol succinate ER 25 milliGRAM(s) Oral daily  sodium chloride 0.9%. 1000 milliLiter(s) (75 mL/Hr) IV Continuous <Continuous>    MEDICATIONS  (PRN):  ALBUTerol    90 MICROgram(s) HFA Inhaler 2 Puff(s) Inhalation every 6 hours PRN Shortness of Breath and/or Wheezing  dextrose 40% Gel 15 Gram(s) Oral once PRN Blood Glucose LESS THAN 70 milliGRAM(s)/deciliter  glucagon  Injectable 1 milliGRAM(s) IntraMuscular once PRN Glucose LESS THAN 70 milligrams/deciliter      Allergies    No Known Allergies    Intolerances        SOCIAL HISTORY:  Smoker:  YES / NO        PACK YEARS:                         WHEN QUIT?  ETOH use:  YES / NO               FREQUENCY / QUANTITY:  Ilicit Drug use:  YES / NO  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:    FAMILY HISTORY:  FH: myocardial infarction: mother: age 80s      Review of Systems (Need 10):  CONSTITUTIONAL: Denies fevers / chills, sweats, fatigue, weight loss, weight gain                                       NEURO:  Denies parathesias, seizures, syncope, confusion                                                                                  EYES:  Denies blurry vision, discharge, pain, loss of vision                                                                                    ENMT:  Denies difficulty hearing, vertigo, dysphagia, epistaxis, recent dental work                                       CV:  Denies chest pain, palpitations, CABRERA, orthopnea                                                                                           RESPIRATORY:  Denies wWheezing, SOB, cough / sputum, hemoptysis                                                               GI:  Denies nausea, vomiting, diarrhea, constipation, melena                                                                          : Denies hematuria, dysuria, urgency, incontinence                                                                                          MUSKULOSKELETAL:  Denies arthritis, joint swelling, muscle weakness                                                             SKIN/BREAST:  Denies rash, itching, hair loss, masses                                                                                              PSYCH:  Denies depression, anxiety, suicidal ideation                                                                                                HEME/LYMPH:  Denies bruises easily, enlarged lymph nodes, tender lymph nodes                                          ENDOCRINE:  Denies cold intolerance, heat intolerance, polydipsia                                                                      Vital Signs Last 24 Hrs  T(C): 36.4 (05 Sep 2019 06:51), Max: 36.9 (05 Sep 2019 06:07)  T(F): 97.6 (05 Sep 2019 06:51), Max: 98.4 (05 Sep 2019 06:07)  HR: 65 (05 Sep 2019 06:07) (55 - 65)  BP: 150/81 (05 Sep 2019 06:07) (121/68 - 150/81)  BP(mean): --  RR: 18 (05 Sep 2019 06:07) (18 - 18)  SpO2: 95% (05 Sep 2019 06:07) (94% - 98%)    Physical Exam (Need 8)  CONSTITUTIONAL:                                                                          WNL  NEURO:                                                                                             WNL                      EYES:                                                                                                  WNL  ENMT:                                                                                                WNL  CV:                                                                                                      WNL  RESPIRATORY:                                                                                  WNL  GI:                                                                                                       WNL  : BARRAZA + / -                                                                                 WNL  MUSKULOSKELETAL:                                                                       WNL  SKIN / BREAST:                                                                                 WNL                                                          LABS:                        14.1   8.59  )-----------( 169      ( 04 Sep 2019 19:32 )             42.5     09-04    141  |  104  |  13  ----------------------------<  114<H>  4.2   |  25  |  0.89    Ca    9.5      04 Sep 2019 19:32  Mg     1.9     09-04    TPro  8.2  /  Alb  4.1  /  TBili  0.5  /  DBili  <0.2  /  AST  26  /  ALT  16  /  AlkPhos  63  09-04    PT/INR - ( 04 Sep 2019 15:56 )   PT: 13.6 sec;   INR: 1.20          PTT - ( 04 Sep 2019 15:56 )  PTT:32.5 sec    CARDIAC MARKERS ( 04 Sep 2019 15:56 )  x     / x     / 158 U/L / x     / 2.6 ng/mL          RADIOLOGY & ADDITIONAL STUDIES:  CAROTID U/S:    CXR:    CT Scan:    EKG:    TTE / TAMMY:    Cardiac Cath:              s:     Home Medications:   * Patient Currently Takes Medications as of 04-Sep-2019 16:24 documented in Structured Notes  · 	albuterol 90 mcg/inh inhalation aerosol: Last Dose Taken:  , 2 puff(s) inhaled 4 times a day, As Needed  · 	ammonium lactate 12% topical cream: Last Dose Taken:  , Apply topically to affected area 2 times a day, As Needed  · 	fluticasone 27.5 mcg/inh nasal spray: Last Dose Taken:  , 1 spray(s) nasal once a day  · 	metFORMIN 500 mg oral tablet: Last Dose Taken:  , 1 tab(s) orally 2 times a day  · 	lisinopril 2.5 mg oral tablet: Last Dose Taken:  , 1 tab(s) orally once a day  · 	Toprol-XL 25 mg oral tablet, extended release: Last Dose Taken:  , 1 tab(s) orally once a day  · 	Lipitor 80 mg oral tablet: Last Dose Taken:  , 1 tab(s) orally once a day  · 	Aspirin Enteric Coated 81 mg oral delayed release tablet: Last Dose Taken:  , 1 tab(s) orally once a day    Patient History:    Past Medical, Past Surgical, and Family History:  PAST SURGICAL HISTORY:  H/O shoulder surgery B/L    H/O total knee replacement, right     S/P cataract surgery.     FAMILY HISTORY:  FH: myocardial infarction, mother: age 80s.     Social History:  Social History (marital status, living situation, occupation, tobacco use, alcohol and drug use, and sexual history): former smoker; quit 1970s; smoked 1-2 PPD X 24 years  	Former drug use; quit 1970s; used marijuana/cocaine/heroine X 5- 10 years  Former ETOH use.     Tobacco Screening:  · Core Measure Site	No  · Has the patient used tobacco in the past 30 days?	No    Risk Assessment:    Present on Admission:  Deep Venous Thrombosis	no  Pulmonary Embolus	no     Heart Failure:  Does this patient have a history of or has been diagnosed with heart failure? yes.     LV Function Assessment (LVS function was evaluated before arrival and/or during hospitalization) yes.     Is the Ejection Fraction >40% ? no.     moderately reduced LV function.     Are there any contraindications to ACEI/ARB therapy? No.       T/HR/RR/BP:  · Temp (F)	97.7 Degrees F  · Temp (C)	36.5 Degrees C  · Heart Rate	65 /min  · Respiration Rate (breaths/min)	18 /min  · BP Systolic	151 mm Hg  · BP Diastolic	81 mm Hg  · BP Noninvasive Mean	104 mm Hg  · SpO2 (%)	99 %  · O2 delivery	room air     Physical Exam:  · Constitutional	detailed exam  · Constitutional Details	well-developed; well-groomed; well-nourished; no distress  · Eyes	EOMI; PERRL; no drainage or redness  · ENMT	No oral lesions; no gross abnormalities  · Neck	detailed exam  · Neck Details	normal; supple; no JVD  · Breasts	not examined  · Back	not examined  · Respiratory	detailed exam  · Respiratory Details	normal; airway patent; breath sounds equal; good air movement; respirations non-labored; clear to auscultation bilaterally  · Cardiovascular	detailed exam  · Cardiovascular Details	regular rate and rhythm  · Cardiovascular Details	positive S1; positive S2  · Gastrointestinal	Soft, non-tender, no hepatosplenomegaly, normal bowel sounds  · Genitourinary	not examined  · Rectal	not examined  · Extremities	detailed exam  · Extremities Details	normal; no clubbing; no cyanosis; no pedal edema  · Vascular	detailed exam  · Carotid Pulse	right normal; left normal  · Radial Pulse	right normal; left normal  · Femoral Pulse	right normal; left normal  · DP Pulse	1+ b/l  · PT Pulse	Doppler b/l  · Neurological	Alert & oriented; no sensory, motor or coordination deficits, normal reflexes  · Skin	No lesions; no rash  · Lymph Nodes	not examined  · Musculoskeletal	not examined  · Psychiatric	Affect and characteristics of appearance, verbalizations, behaviors are appropriate  · Additional PE	ASA: II  Mallampati : II   12 Lead EKG: Sinus Bradycardia @ 59 BPM, Left Axis Deviation, TWI III, II, V5, V6 Surgeon: Yosef    Requesting Physician:    HISTORY OF PRESENT ILLNESS:    73 year old male, former smoker, fomer drug and ETOH abuse, with FHx of CAD (MI mother: age 80s), PMH of HTN, DM2, Asthma (denies any hospitalization/intubation);  who was referred to cardiologist Dr. Santos by PCP for abnormal ECG.  Patient reports that 2 weeks ago he was in the bus with his wife when he started to sweat profusely with associated mid-sternal; non-radiating chest tightness he describes it as pulling. He thought he had indigestion and reports feeling better after having seltzer water. His wife was concerned; thus they went to see his PCP who did a EKG and referred the pt. to Dr. Santos. Patient reports that was the only episode he had so far; denies any CP and SOB at baseline, dizziness, diaphoresis, palpitations, fatigue, LE edema, orthopnea, PND, syncope. He reports that he is very active and works 5 days/week. Subsequently; Pt had a ECHO 08/23/19 revealing EF 40-45 %; basal mid and distal inferior wall hypokinesis. EKG as per MD note revealed inferior infarction.  Due to pts risk factors, CCS Angina Class 4 Sx, abnormal EKG and ECHO , pt is referred for cardiac catheterization w/ possible intervention.   Of note; pt. was initially started on Metoprolol 25 mg QD; Plavix 75 mg daily (first dose 8/23/19) and Atorvastatin 80 mg once a day by DR. Santos last week in the office;    PAST MEDICAL & SURGICAL HISTORY:  S/P cataract surgery  H/O shoulder surgery: B/L  H/O total knee replacement, right      MEDICATIONS  (STANDING):  ammonium lactate 12% Lotion 1 Application(s) Topical two times a day  aspirin enteric coated 81 milliGRAM(s) Oral daily  atorvastatin 80 milliGRAM(s) Oral at bedtime  chlorhexidine 4% Liquid 1 Application(s) Topical once  clopidogrel Tablet 75 milliGRAM(s) Oral daily  dextrose 5%. 1000 milliLiter(s) (50 mL/Hr) IV Continuous <Continuous>  dextrose 50% Injectable 12.5 Gram(s) IV Push once  dextrose 50% Injectable 25 Gram(s) IV Push once  dextrose 50% Injectable 25 Gram(s) IV Push once  fluticasone propionate 50 MICROgram(s)/spray Nasal Spray 1 Spray(s) Both Nostrils daily  insulin lispro (HumaLOG) corrective regimen sliding scale   SubCutaneous Before meals and at bedtime  lisinopril 2.5 milliGRAM(s) Oral daily  metoprolol succinate ER 25 milliGRAM(s) Oral daily  sodium chloride 0.9%. 1000 milliLiter(s) (75 mL/Hr) IV Continuous <Continuous>    MEDICATIONS  (PRN):  ALBUTerol    90 MICROgram(s) HFA Inhaler 2 Puff(s) Inhalation every 6 hours PRN Shortness of Breath and/or Wheezing  dextrose 40% Gel 15 Gram(s) Oral once PRN Blood Glucose LESS THAN 70 milliGRAM(s)/deciliter  glucagon  Injectable 1 milliGRAM(s) IntraMuscular once PRN Glucose LESS THAN 70 milligrams/deciliter      Allergies    No Known Allergies    Intolerances        SOCIAL HISTORY:  Smoker:  YES / NO        PACK YEARS:                         WHEN QUIT?  ETOH use:  YES / NO               FREQUENCY / QUANTITY:  Ilicit Drug use:  YES / NO  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:    FAMILY HISTORY:  FH: myocardial infarction: mother: age 80s      Review of Systems (Need 10):  CONSTITUTIONAL: Denies fevers / chills, sweats, fatigue, weight loss, weight gain                                       NEURO:  Denies parathesias, seizures, syncope, confusion                                                                                  EYES:  Denies blurry vision, discharge, pain, loss of vision                                                                                    ENMT:  Denies difficulty hearing, vertigo, dysphagia, epistaxis, recent dental work                                       CV:  Denies chest pain, palpitations, CABRERA, orthopnea                                                                                           RESPIRATORY:  Denies wWheezing, SOB, cough / sputum, hemoptysis                                                               GI:  Denies nausea, vomiting, diarrhea, constipation, melena                                                                          : Denies hematuria, dysuria, urgency, incontinence                                                                                          MUSKULOSKELETAL:  Denies arthritis, joint swelling, muscle weakness                                                             SKIN/BREAST:  Denies rash, itching, hair loss, masses                                                                                              PSYCH:  Denies depression, anxiety, suicidal ideation                                                                                                HEME/LYMPH:  Denies bruises easily, enlarged lymph nodes, tender lymph nodes                                          ENDOCRINE:  Denies cold intolerance, heat intolerance, polydipsia                                                                      Vital Signs Last 24 Hrs  T(C): 36.4 (05 Sep 2019 06:51), Max: 36.9 (05 Sep 2019 06:07)  T(F): 97.6 (05 Sep 2019 06:51), Max: 98.4 (05 Sep 2019 06:07)  HR: 65 (05 Sep 2019 06:07) (55 - 65)  BP: 150/81 (05 Sep 2019 06:07) (121/68 - 150/81)  BP(mean): --  RR: 18 (05 Sep 2019 06:07) (18 - 18)  SpO2: 95% (05 Sep 2019 06:07) (94% - 98%)    Physical Exam (Need 8)  CONSTITUTIONAL:                                                                          WNL  NEURO:                                                                                             WNL                      EYES:                                                                                                  WNL  ENMT:                                                                                                WNL  CV:                                                                                                      WNL  RESPIRATORY:                                                                                  WNL  GI:                                                                                                       WNL  : BARRAZA + / -                                                                                 WNL  MUSKULOSKELETAL:                                                                       WNL  SKIN / BREAST:                                                                                 WNL                                                          LABS:                        14.1   8.59  )-----------( 169      ( 04 Sep 2019 19:32 )             42.5     09-04    141  |  104  |  13  ----------------------------<  114<H>  4.2   |  25  |  0.89    Ca    9.5      04 Sep 2019 19:32  Mg     1.9     09-04    TPro  8.2  /  Alb  4.1  /  TBili  0.5  /  DBili  <0.2  /  AST  26  /  ALT  16  /  AlkPhos  63  09-04    PT/INR - ( 04 Sep 2019 15:56 )   PT: 13.6 sec;   INR: 1.20          PTT - ( 04 Sep 2019 15:56 )  PTT:32.5 sec    CARDIAC MARKERS ( 04 Sep 2019 15:56 )  x     / x     / 158 U/L / x     / 2.6 ng/mL          RADIOLOGY & ADDITIONAL STUDIES:  CAROTID U/S:    CXR:    CT Scan:    EKG:    TTE / TAMMY:    Cardiac Cath:              s:     Home Medications:   * Patient Currently Takes Medications as of 04-Sep-2019 16:24 documented in Structured Notes  · 	albuterol 90 mcg/inh inhalation aerosol: Last Dose Taken:  , 2 puff(s) inhaled 4 times a day, As Needed  · 	ammonium lactate 12% topical cream: Last Dose Taken:  , Apply topically to affected area 2 times a day, As Needed  · 	fluticasone 27.5 mcg/inh nasal spray: Last Dose Taken:  , 1 spray(s) nasal once a day  · 	metFORMIN 500 mg oral tablet: Last Dose Taken:  , 1 tab(s) orally 2 times a day  · 	lisinopril 2.5 mg oral tablet: Last Dose Taken:  , 1 tab(s) orally once a day  · 	Toprol-XL 25 mg oral tablet, extended release: Last Dose Taken:  , 1 tab(s) orally once a day  · 	Lipitor 80 mg oral tablet: Last Dose Taken:  , 1 tab(s) orally once a day  · 	Aspirin Enteric Coated 81 mg oral delayed release tablet: Last Dose Taken:  , 1 tab(s) orally once a day    Patient History:    Past Medical, Past Surgical, and Family History:  PAST SURGICAL HISTORY:  H/O shoulder surgery B/L    H/O total knee replacement, right     S/P cataract surgery.     FAMILY HISTORY:  FH: myocardial infarction, mother: age 80s.     Social History:  Social History (marital status, living situation, occupation, tobacco use, alcohol and drug use, and sexual history): former smoker; quit 1970s; smoked 1-2 PPD X 24 years  	Former drug use; quit 1970s; used marijuana/cocaine/heroine X 5- 10 years  Former ETOH use.     Tobacco Screening:  · Core Measure Site	No  · Has the patient used tobacco in the past 30 days?	No    Risk Assessment:    Present on Admission:  Deep Venous Thrombosis	no  Pulmonary Embolus	no     Heart Failure:  Does this patient have a history of or has been diagnosed with heart failure? yes.     LV Function Assessment (LVS function was evaluated before arrival and/or during hospitalization) yes.     Is the Ejection Fraction >40% ? no.     moderately reduced LV function.     Are there any contraindications to ACEI/ARB therapy? No.       T/HR/RR/BP:  · Temp (F)	97.7 Degrees F  · Temp (C)	36.5 Degrees C  · Heart Rate	65 /min  · Respiration Rate (breaths/min)	18 /min  · BP Systolic	151 mm Hg  · BP Diastolic	81 mm Hg  · BP Noninvasive Mean	104 mm Hg  · SpO2 (%)	99 %  · O2 delivery	room air     Physical Exam:  · Constitutional	detailed exam  · Constitutional Details	well-developed; well-groomed; well-nourished; no distress  · Eyes	EOMI; PERRL; no drainage or redness  · ENMT	No oral lesions; no gross abnormalities  · Neck	detailed exam  · Neck Details	normal; supple; no JVD  · Breasts	not examined  · Back	not examined  · Respiratory	detailed exam  · Respiratory Details	normal; airway patent; breath sounds equal; good air movement; respirations non-labored; clear to auscultation bilaterally  · Cardiovascular	detailed exam  · Cardiovascular Details	regular rate and rhythm  · Cardiovascular Details	positive S1; positive S2  · Gastrointestinal	Soft, non-tender, no hepatosplenomegaly, normal bowel sounds  · Genitourinary	not examined  · Rectal	not examined  · Extremities	detailed exam  · Extremities Details	normal; no clubbing; no cyanosis; no pedal edema  · Vascular	detailed exam  · Carotid Pulse	right normal; left normal  · Radial Pulse	right normal; left normal  · Femoral Pulse	right normal; left normal  · DP Pulse	1+ b/l  · PT Pulse	Doppler b/l  · Neurological	Alert & oriented; no sensory, motor or coordination deficits, normal reflexes  · Skin	No lesions; no rash  · Lymph Nodes	not examined  · Musculoskeletal	not examined  · Psychiatric	Affect and characteristics of appearance, verbalizations, behaviors are appropriate  · Additional PE	ASA: II  Mallampati : II   12 Lead EKG: Sinus Bradycardia @ 59 BPM, Left Axis Deviation, TWI III, II, V5, V6 Surgeon: Yosef    Requesting Physician:    HISTORY OF PRESENT ILLNESS:    73 year old male, former smoker, fomer drug and ETOH abuse, with FHx of CAD (MI mother: age 80s), PMH of HTN, DM2, Asthma (denies any hospitalization/intubation);  who was referred to cardiologist Dr. Santos by PCP for abnormal ECG.  Patient reports that 2 weeks ago he was in the bus with his wife when he started to sweat profusely with associated mid-sternal; non-radiating chest tightness he describes it as pulling. He thought he had indigestion and reports feeling better after having seltzer water. His wife was concerned; thus they went to see his PCP who did a EKG and referred the pt. to Dr. Santos. Patient reports that was the only episode he had so far; denies any CP and SOB at baseline, dizziness, diaphoresis, palpitations, fatigue, LE edema, orthopnea, PND, syncope. He reports that he is very active and works 5 days/week. Subsequently; Pt had a ECHO 08/23/19 revealing EF 40-45 %; basal mid and distal inferior wall hypokinesis. EKG as per MD note revealed inferior infarction.  Due to pts risk factors, CCS Angina Class 4 Sx, abnormal EKG and ECHO , pt is referred for cardiac catheterization w/ possible intervention.   Of note; pt. was initially started on Metoprolol 25 mg QD; Plavix 75 mg daily (first dose 8/23/19) and Atorvastatin 80 mg once a day by DR. Santos last week in the office;    PAST MEDICAL & SURGICAL HISTORY:  S/P cataract surgery  H/O shoulder surgery: B/L  H/O total knee replacement, right       MEDICATIONS  (STANDING):  ammonium lactate 12% Lotion 1 Application(s) Topical two times a day  aspirin enteric coated 81 milliGRAM(s) Oral daily  atorvastatin 80 milliGRAM(s) Oral at bedtime  chlorhexidine 4% Liquid 1 Application(s) Topical once  clopidogrel Tablet 75 milliGRAM(s) Oral daily  dextrose 5%. 1000 milliLiter(s) (50 mL/Hr) IV Continuous <Continuous>  dextrose 50% Injectable 12.5 Gram(s) IV Push once  dextrose 50% Injectable 25 Gram(s) IV Push once  dextrose 50% Injectable 25 Gram(s) IV Push once  fluticasone propionate 50 MICROgram(s)/spray Nasal Spray 1 Spray(s) Both Nostrils daily  insulin lispro (HumaLOG) corrective regimen sliding scale   SubCutaneous Before meals and at bedtime  lisinopril 2.5 milliGRAM(s) Oral daily  metoprolol succinate ER 25 milliGRAM(s) Oral daily  sodium chloride 0.9%. 1000 milliLiter(s) (75 mL/Hr) IV Continuous <Continuous>    MEDICATIONS  (PRN):  ALBUTerol    90 MICROgram(s) HFA Inhaler 2 Puff(s) Inhalation every 6 hours PRN Shortness of Breath and/or Wheezing  dextrose 40% Gel 15 Gram(s) Oral once PRN Blood Glucose LESS THAN 70 milliGRAM(s)/deciliter  glucagon  Injectable 1 milliGRAM(s) IntraMuscular once PRN Glucose LESS THAN 70 milligrams/deciliter      Allergies    No Known Allergies    Intolerances        SOCIAL HISTORY:  Smoker:  YES / NO        PACK YEARS:                         WHEN QUIT?  ETOH use:  YES / NO               FREQUENCY / QUANTITY:  Ilicit Drug use:  YES / NO  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:    FAMILY HISTORY:  FH: myocardial infarction: mother: age 80s      Review of Systems (Need 10):  CONSTITUTIONAL: Denies fevers / chills, sweats, fatigue, weight loss, weight gain                                       NEURO:  Denies parathesias, seizures, syncope, confusion                                                                                  EYES:  Denies blurry vision, discharge, pain, loss of vision                                                                                    ENMT:  Denies difficulty hearing, vertigo, dysphagia, epistaxis, recent dental work                                       CV:  Denies chest pain, palpitations, CABRERA, orthopnea                                                                                           RESPIRATORY:  Denies wWheezing, SOB, cough / sputum, hemoptysis                                                               GI:  Denies nausea, vomiting, diarrhea, constipation, melena                                                                          : Denies hematuria, dysuria, urgency, incontinence                                                                                          MUSKULOSKELETAL:  Denies arthritis, joint swelling, muscle weakness                                                             SKIN/BREAST:  Denies rash, itching, hair loss, masses                                                                                              PSYCH:  Denies depression, anxiety, suicidal ideation                                                                                                HEME/LYMPH:  Denies bruises easily, enlarged lymph nodes, tender lymph nodes                                          ENDOCRINE:  Denies cold intolerance, heat intolerance, polydipsia                                                                      Vital Signs Last 24 Hrs  T(C): 36.4 (05 Sep 2019 06:51), Max: 36.9 (05 Sep 2019 06:07)  T(F): 97.6 (05 Sep 2019 06:51), Max: 98.4 (05 Sep 2019 06:07)  HR: 65 (05 Sep 2019 06:07) (55 - 65)  BP: 150/81 (05 Sep 2019 06:07) (121/68 - 150/81)  BP(mean): --  RR: 18 (05 Sep 2019 06:07) (18 - 18)  SpO2: 95% (05 Sep 2019 06:07) (94% - 98%)    Physical Exam (Need 8)  CONSTITUTIONAL:                                                                          WNL  NEURO:                                                                                             WNL                      EYES:                                                                                                  WNL  ENMT:                                                                                                WNL  CV:                                                                                                      WNL  RESPIRATORY:                                                                                  WNL  GI:                                                                                                       WNL  : BARRAZA + / -                                                                                 WNL  MUSKULOSKELETAL:                                                                       WNL  SKIN / BREAST:                                                                                 WNL                                                          LABS:                        14.1   8.59  )-----------( 169      ( 04 Sep 2019 19:32 )             42.5     09-04    141  |  104  |  13  ----------------------------<  114<H>  4.2   |  25  |  0.89    Ca    9.5      04 Sep 2019 19:32  Mg     1.9     09-04    TPro  8.2  /  Alb  4.1  /  TBili  0.5  /  DBili  <0.2  /  AST  26  /  ALT  16  /  AlkPhos  63  09-04    PT/INR - ( 04 Sep 2019 15:56 )   PT: 13.6 sec;   INR: 1.20          PTT - ( 04 Sep 2019 15:56 )  PTT:32.5 sec    CARDIAC MARKERS ( 04 Sep 2019 15:56 )  x     / x     / 158 U/L / x     / 2.6 ng/mL          RADIOLOGY & ADDITIONAL STUDIES:  CAROTID U/S:    CXR:    CT Scan:    EKG:    TTE / TAMMY:    Cardiac Cath:              s:     Home Medications:   * Patient Currently Takes Medications as of 04-Sep-2019 16:24 documented in Structured Notes  · 	albuterol 90 mcg/inh inhalation aerosol: Last Dose Taken:  , 2 puff(s) inhaled 4 times a day, As Needed  · 	ammonium lactate 12% topical cream: Last Dose Taken:  , Apply topically to affected area 2 times a day, As Needed  · 	fluticasone 27.5 mcg/inh nasal spray: Last Dose Taken:  , 1 spray(s) nasal once a day  · 	metFORMIN 500 mg oral tablet: Last Dose Taken:  , 1 tab(s) orally 2 times a day  · 	lisinopril 2.5 mg oral tablet: Last Dose Taken:  , 1 tab(s) orally once a day  · 	Toprol-XL 25 mg oral tablet, extended release: Last Dose Taken:  , 1 tab(s) orally once a day  · 	Lipitor 80 mg oral tablet: Last Dose Taken:  , 1 tab(s) orally once a day  · 	Aspirin Enteric Coated 81 mg oral delayed release tablet: Last Dose Taken:  , 1 tab(s) orally once a day    Patient History:    Past Medical, Past Surgical, and Family History:  PAST SURGICAL HISTORY:  H/O shoulder surgery B/L    H/O total knee replacement, right     S/P cataract surgery.     FAMILY HISTORY:  FH: myocardial infarction, mother: age 80s.     Social History:  Social History (marital status, living situation, occupation, tobacco use, alcohol and drug use, and sexual history): former smoker; quit 1970s; smoked 1-2 PPD X 24 years  	Former drug use; quit 1970s; used marijuana/cocaine/heroine X 5- 10 years  Former ETOH use.     Tobacco Screening:  · Core Measure Site	No  · Has the patient used tobacco in the past 30 days?	No    Risk Assessment:    Present on Admission:  Deep Venous Thrombosis	no  Pulmonary Embolus	no     Heart Failure:  Does this patient have a history of or has been diagnosed with heart failure? yes.     LV Function Assessment (LVS function was evaluated before arrival and/or during hospitalization) yes.     Is the Ejection Fraction >40% ? no.     moderately reduced LV function.     Are there any contraindications to ACEI/ARB therapy? No.       T/HR/RR/BP:  · Temp (F)	97.7 Degrees F  · Temp (C)	36.5 Degrees C  · Heart Rate	65 /min  · Respiration Rate (breaths/min)	18 /min  · BP Systolic	151 mm Hg  · BP Diastolic	81 mm Hg  · BP Noninvasive Mean	104 mm Hg  · SpO2 (%)	99 %  · O2 delivery	room air     Physical Exam:  · Constitutional	detailed exam  · Constitutional Details	well-developed; well-groomed; well-nourished; no distress  · Eyes	EOMI; PERRL; no drainage or redness  · ENMT	No oral lesions; no gross abnormalities  · Neck	detailed exam  · Neck Details	normal; supple; no JVD  · Breasts	not examined  · Back	not examined  · Respiratory	detailed exam  · Respiratory Details	normal; airway patent; breath sounds equal; good air movement; respirations non-labored; clear to auscultation bilaterally  · Cardiovascular	detailed exam  · Cardiovascular Details	regular rate and rhythm  · Cardiovascular Details	positive S1; positive S2  · Gastrointestinal	Soft, non-tender, no hepatosplenomegaly, normal bowel sounds  · Genitourinary	not examined  · Rectal	not examined  · Extremities	detailed exam  · Extremities Details	normal; no clubbing; no cyanosis; no pedal edema  · Vascular	detailed exam  · Carotid Pulse	right normal; left normal  · Radial Pulse	right normal; left normal  · Femoral Pulse	right normal; left normal  · DP Pulse	1+ b/l  · PT Pulse	Doppler b/l  · Neurological	Alert & oriented; no sensory, motor or coordination deficits, normal reflexes  · Skin	No lesions; no rash  · Lymph Nodes	not examined  · Musculoskeletal	not examined  · Psychiatric	Affect and characteristics of appearance, verbalizations, behaviors are appropriate  · Additional PE	ASA: II  Mallampati : II   12 Lead EKG: Sinus Bradycardia @ 59 BPM, Left Axis Deviation, TWI III, II, V5, V6 Surgeon: Yosef    Requesting Physician: Anny    HISTORY OF PRESENT ILLNESS:    73 year old male, former smoker, fomer drug and ETOH abuse, with FHx of CAD (MI mother: age 80s), PMH of HTN, DM2, Asthma (denies any hospitalization/intubation);  who was referred to cardiologist Dr. Santos by PCP for abnormal ECG.  Patient reports that 2 weeks ago he was in the bus with his wife when he started to sweat profusely with associated mid-sternal; non-radiating chest tightness he describes it as pulling. He thought he had indigestion and reports feeling better after having seltzer water. His wife was concerned; thus they went to see his PCP who did a EKG and referred the pt. to Dr. Santos. Patient reports that was the only episode he had so far; denies any CP and SOB at baseline, dizziness, diaphoresis, palpitations, fatigue, LE edema, orthopnea, PND, syncope. He reports that he is very active and works 5 days/week. Subsequently; Pt had a ECHO 19 revealing EF 40-45 %; basal mid and distal inferior wall hypokinesis. EKG as per MD note revealed inferior infarction.  Due to pts risk factors, CCS Angina Class 4 Sx, abnormal EKG and ECHO , pt was referred for cardiac catheterization via right radial on 19 which revealed 2VD, mild LV hypokinesis, EF 50%, 90% dRCA, 70% RPDA, 30-50% dLM, 90% oLAD, 80% mLAD, 30-50% pLCx R.     Of note: patient was started on Metoprolol 25 mg QD; Plavix 75 mg daily (first dose 19) and Atorvastatin 80 mg once a day by DR. Santos last week in the office;      PAST MEDICAL & SURGICAL HISTORY:  Hypertension  DM2  Asthma (denies hospitalization/intubations)  S/P cataract surgery  H/O shoulder surgery: B/L  H/O total knee replacement, right       MEDICATIONS  (STANDING):  aspirin enteric coated 81 milliGRAM(s) Oral daily  atorvastatin 80 milliGRAM(s) Oral at bedtime  clopidogrel Tablet 75 milliGRAM(s) Oral daily  fluticasone propionate 50 MICROgram(s)/spray Nasal Spray 1 Spray(s) Both Nostrils daily  insulin lispro (HumaLOG) corrective regimen sliding scale   SubCutaneous Before meals and at bedtime  lisinopril 2.5 milliGRAM(s) Oral daily  metoprolol succinate ER 25 milliGRAM(s) Oral daily    MEDICATIONS  (PRN):  ALBUTerol    90 MICROgram(s) HFA Inhaler 2 Puff(s) Inhalation every 6 hours PRN Shortness of Breath and/or Wheezing  dextrose 40% Gel 15 Gram(s) Oral once PRN Blood Glucose LESS THAN 70 milliGRAM(s)/deciliter  glucagon  Injectable 1 milliGRAM(s) IntraMuscular once PRN Glucose LESS THAN 70 milligrams/deciliter      Allergies    No Known Allergies      SOCIAL HISTORY:  Smoker:  Former, quit 1970s; smoked 1-2 PPD X 24 years  ETOH use:  Former  Ilicit Drug use:  Former used marijuana/cocaine/heroine X 5- 10 years      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:    FAMILY HISTORY:  FH: myocardial infarction: mother: age 80s      Review of Systems (Need 10):  CONSTITUTIONAL: Denies fevers / chills, sweats, fatigue, weight loss, weight gain                                       NEURO:  Denies parathesias, seizures, syncope, confusion                                                                                  EYES:  Denies blurry vision, discharge, pain, loss of vision                                                                                    ENMT:  Denies difficulty hearing, vertigo, dysphagia, epistaxis, recent dental work                                       CV:  per HPI                                                                                           RESPIRATORY:  Denies Wheezing, SOB, cough / sputum, hemoptysis                                                               GI:  Denies nausea, vomiting, diarrhea, constipation, melena                                                                          : Denies hematuria, dysuria, urgency, incontinence                                                                                          MUSCULOSKELETAL  Denies arthritis, joint swelling, muscle weakness                                                             SKIN/BREAST:  Denies rash, itching,                                                                                             PSYCH:  Denies depression, anxiety, suicidal ideation                                                                                                HEME/LYMPH:  Denies bruises easily, enlarged lymph nodes, tender lymph nodes                                          ENDOCRINE:  Denies cold intolerance, heat intolerance, polydipsia                                                                        Vital Signs Last 24 Hrs  T(C): 36.4 (05 Sep 2019 06:51), Max: 36.9 (05 Sep 2019 06:07)  T(F): 97.6 (05 Sep 2019 06:51), Max: 98.4 (05 Sep 2019 06:07)  HR: 65 (05 Sep 2019 06:07) (55 - 65)  BP: 150/81 (05 Sep 2019 06:07) (121/68 - 150/81)  BP(mean): --  RR: 18 (05 Sep 2019 06:07) (18 - 18)  SpO2: 95% (05 Sep 2019 06:07) (94% - 98%)    Physical Exam (Need 8)  CONSTITUTIONAL:                                                                          WNL  NEURO:                                                                                             WNL                      EYES:                                                                                                  WNL  ENMT:                                                                                                WNL  CV:                                                                                                      WNL  RESPIRATORY:                                                                                  WNL  GI:                                                                                                       WNL  : BARRAZA + / -                                                                                 WNL  MUSKULOSKELETAL:                                                                       WNL  SKIN / BREAST:                                                                                 WNL                                                          LABS:                        14.1   8.59  )-----------( 169      ( 04 Sep 2019 19:32 )             42.5         141  |  104  |  13  ----------------------------<  114<H>  4.2   |  25  |  0.89    Ca    9.5      04 Sep 2019 19:32  Mg     1.9         TPro  8.2  /  Alb  4.1  /  TBili  0.5  /  DBili  <0.2  /  AST  26  /  ALT  16  /  AlkPhos  63  -04    PT/INR - ( 04 Sep 2019 15:56 )   PT: 13.6 sec;   INR: 1.20          PTT - ( 04 Sep 2019 15:56 )  PTT:32.5 sec    CARDIAC MARKERS ( 04 Sep 2019 15:56 )  x     / x     / 158 U/L / x     / 2.6 ng/mL          RADIOLOGY & ADDITIONAL STUDIES:  CAROTID U/S:    CXR:    CT Scan:    EK Lead EKG: Sinus Bradycardia @ 59 BPM, Left Axis Deviation, TWI III, II, V5, V6    TTE / TAMMY:    Cardiac Cath: Surgeon: Yosef    Requesting Physician: Anny    HISTORY OF PRESENT ILLNESS:    73 year old male, former smoker, fomer drug and ETOH abuse, with FHx of CAD (MI mother: age 80s), PMH of HTN, DM2, Asthma (denies any hospitalization/intubation);  who was referred to cardiologist Dr. Santos by PCP for abnormal ECG.  Patient reports that 2 weeks ago he was in the bus with his wife when he started to sweat profusely with associated mid-sternal; non-radiating chest tightness he describes it as pulling. He thought he had indigestion and reports feeling better after having seltzer water. His wife was concerned; thus they went to see his PCP who did a EKG and referred the pt. to Dr. Santos. Patient reports that was the only episode he had so far; denies any CP and SOB at baseline, dizziness, diaphoresis, palpitations, fatigue, LE edema, orthopnea, PND, syncope. He reports that he is very active and works 5 days/week. Subsequently; Pt had a ECHO 19 revealing EF 40-45 %; basal mid and distal inferior wall hypokinesis. EKG as per MD note revealed inferior infarction.  Due to pts risk factors, CCS Angina Class 4 Sx, abnormal EKG and ECHO , pt was referred for cardiac catheterization via right radial on 19 which revealed 2VD, mild LV hypokinesis, EF 50%, 90% dRCA, 70% RPDA, 30-50% dLM, 90% oLAD, 80% mLAD, 30-50% pLCx R.     Of note: patient was started on Metoprolol 25 mg QD; Plavix 75 mg daily (first dose 19) and Atorvastatin 80 mg once a day by DR. Santos last week in the office;      PAST MEDICAL & SURGICAL HISTORY:  Hypertension  DM2  Asthma (denies hospitalization/intubations)  S/P cataract surgery  H/O shoulder surgery: B/L  H/O total knee replacement, right       MEDICATIONS  (STANDING):  aspirin enteric coated 81 milliGRAM(s) Oral daily  atorvastatin 80 milliGRAM(s) Oral at bedtime  clopidogrel Tablet 75 milliGRAM(s) Oral daily  fluticasone propionate 50 MICROgram(s)/spray Nasal Spray 1 Spray(s) Both Nostrils daily  insulin lispro (HumaLOG) corrective regimen sliding scale   SubCutaneous Before meals and at bedtime  metoprolol succinate ER 25 milliGRAM(s) Oral daily    MEDICATIONS  (PRN):  ALBUTerol    90 MICROgram(s) HFA Inhaler 2 Puff(s) Inhalation every 6 hours PRN Shortness of Breath and/or Wheezing  dextrose 40% Gel 15 Gram(s) Oral once PRN Blood Glucose LESS THAN 70 milliGRAM(s)/deciliter  glucagon  Injectable 1 milliGRAM(s) IntraMuscular once PRN Glucose LESS THAN 70 milligrams/deciliter      Allergies    No Known Allergies      SOCIAL HISTORY:  Smoker:  Former, quit 1970s; smoked 1-2 PPD X 24 years  ETOH use:  Former  Ilicit Drug use:  Former used marijuana/cocaine/heroine X 5- 10 years      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:    FAMILY HISTORY:  FH: myocardial infarction: mother: age 80s      Review of Systems (Need 10):  CONSTITUTIONAL: Denies fevers / chills, sweats, fatigue, weight loss, weight gain                                       NEURO:  Denies parathesias, seizures, syncope, confusion                                                                                  EYES:  Denies blurry vision, discharge, pain, loss of vision                                                                                    ENMT:  Denies difficulty hearing, vertigo, dysphagia, epistaxis, recent dental work                                       CV:  per HPI                                                                                           RESPIRATORY:  Denies Wheezing, SOB, cough / sputum, hemoptysis                                                               GI:  Denies nausea, vomiting, diarrhea, constipation, melena                                                                          : Denies hematuria, dysuria, urgency, incontinence                                                                                          MUSCULOSKELETAL  Denies arthritis, joint swelling, muscle weakness                                                             SKIN/BREAST:  Denies rash, itching,                                                                                             PSYCH:  Denies depression, anxiety, suicidal ideation                                                                                                HEME/LYMPH:  Denies bruises easily, enlarged lymph nodes, tender lymph nodes                                          ENDOCRINE:  Denies cold intolerance, heat intolerance, polydipsia                                                                        Vital Signs Last 24 Hrs  T(C): 36.4 (05 Sep 2019 06:51), Max: 36.9 (05 Sep 2019 06:07)  T(F): 97.6 (05 Sep 2019 06:51), Max: 98.4 (05 Sep 2019 06:07)  HR: 65 (05 Sep 2019 06:07) (55 - 65)  BP: 150/81 (05 Sep 2019 06:07) (121/68 - 150/81)  BP(mean): --  RR: 18 (05 Sep 2019 06:07) (18 - 18)  SpO2: 95% (05 Sep 2019 06:07) (94% - 98%)    Physical Exam (Need 8)  CONSTITUTIONAL:                                                                          WNL  NEURO:                                                                                             WNL                      EYES:                                                                                                  WNL  ENMT:                                                                                                WNL  CV:                                                                                                      WNL  RESPIRATORY:                                                                                  WNL  GI:                                                                                                       WNL  : BARRAZA + / -                                                                                 WNL  MUSKULOSKELETAL:                                                                       WNL  SKIN / BREAST:                                                                                 WNL                                                          LABS:                        14.1   8.59  )-----------( 169      ( 04 Sep 2019 19:32 )             42.5         141  |  104  |  13  ----------------------------<  114<H>  4.2   |  25  |  0.89    Ca    9.5      04 Sep 2019 19:32  Mg     1.9         TPro  8.2  /  Alb  4.1  /  TBili  0.5  /  DBili  <0.2  /  AST  26  /  ALT  16  /  AlkPhos  63  -    PT/INR - ( 04 Sep 2019 15:56 )   PT: 13.6 sec;   INR: 1.20          PTT - ( 04 Sep 2019 15:56 )  PTT:32.5 sec    CARDIAC MARKERS ( 04 Sep 2019 15:56 )  x     / x     / 158 U/L / x     / 2.6 ng/mL          RADIOLOGY & ADDITIONAL STUDIES:  CAROTID U/S:    CXR:    CT Scan:    EK Lead EKG: Sinus Bradycardia @ 59 BPM, Left Axis Deviation, TWI III, II, V5, V6    TTE / TAMMY:    Cardiac Cath: Surgeon: Yosef    Requesting Physician: Anny    HISTORY OF PRESENT ILLNESS:    73 year old male, former smoker, fomer drug and ETOH abuse, with FHx of CAD (MI mother: age 80s), PMH of HTN, DM2, Asthma (denies any hospitalization/intubation);  who was referred to cardiologist Dr. Santos by PCP for abnormal ECG.  Patient reports that 2 weeks ago he was in the bus with his wife when he started to sweat profusely with associated mid-sternal; non-radiating chest tightness he describes it as pulling. He thought he had indigestion and reports feeling better after having seltzer water. His wife was concerned; thus they went to see his PCP who did a EKG and referred the pt. to Dr. Santos.     Patient reports that was the only episode he had so far; denies any CP and SOB at baseline, dizziness, diaphoresis, palpitations, fatigue, LE edema, orthopnea, PND, syncope. He reports that he is very active and works 5 days/week. Subsequently; Pt had a ECHO 19 revealing EF 40-45 %; basal mid and distal inferior wall hypokinesis. EKG as per MD note revealed inferior infarction.  Due to pts risk factors, CCS Angina Class 4 Sx, abnormal EKG and ECHO , pt was referred for cardiac catheterization via right radial on 19 which revealed 2VD, mild LV hypokinesis, EF 50%, 90% dRCA, 70% RPDA, 30-50% dLM, 90% oLAD, 80% mLAD, 30-50% pLCx R.     Of note: patient was started on Metoprolol 25 mg QD; Plavix 75 mg daily (first dose 19) and Atorvastatin 80 mg once a day by DR. Santos last week in the office;      PAST MEDICAL & SURGICAL HISTORY:  Hypertension  DM2  Asthma (denies hospitalization/intubations)  S/P cataract surgery  H/O shoulder surgery: B/L  H/O total knee replacement, right       MEDICATIONS  (STANDING):  aspirin enteric coated 81 milliGRAM(s) Oral daily  atorvastatin 80 milliGRAM(s) Oral at bedtime  clopidogrel Tablet 75 milliGRAM(s) Oral daily  fluticasone propionate 50 MICROgram(s)/spray Nasal Spray 1 Spray(s) Both Nostrils daily  insulin lispro (HumaLOG) corrective regimen sliding scale   SubCutaneous Before meals and at bedtime  metoprolol succinate ER 25 milliGRAM(s) Oral daily    MEDICATIONS  (PRN):  ALBUTerol    90 MICROgram(s) HFA Inhaler 2 Puff(s) Inhalation every 6 hours PRN Shortness of Breath and/or Wheezing  dextrose 40% Gel 15 Gram(s) Oral once PRN Blood Glucose LESS THAN 70 milliGRAM(s)/deciliter  glucagon  Injectable 1 milliGRAM(s) IntraMuscular once PRN Glucose LESS THAN 70 milligrams/deciliter      Allergies    No Known Allergies      SOCIAL HISTORY:  Smoker:  Former, quit 1970s; smoked 1-2 PPD X 24 years  ETOH use:  Former  Ilicit Drug use:  Former used marijuana/cocaine/heroine X 5- 10 years      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:    FAMILY HISTORY:  FH: myocardial infarction: mother: age 80s      Review of Systems (Need 10):  CONSTITUTIONAL: Denies fevers / chills, sweats, fatigue, weight loss, weight gain                                       NEURO:  Denies parathesias, seizures, syncope, confusion                                                                                  EYES:  Denies blurry vision, discharge, pain, loss of vision                                                                                    ENMT:  Denies difficulty hearing, vertigo, dysphagia, epistaxis, recent dental work                                       CV:  per HPI                                                                                           RESPIRATORY:  Denies Wheezing, SOB, cough / sputum, hemoptysis                                                               GI:  Denies nausea, vomiting, diarrhea, constipation, melena                                                                          : Denies hematuria, dysuria, urgency, incontinence                                                                                          MUSCULOSKELETAL  Denies arthritis, joint swelling, muscle weakness                                                             SKIN/BREAST:  Denies rash, itching,                                                                                             PSYCH:  Denies depression, anxiety, suicidal ideation                                                                                                HEME/LYMPH:  Denies bruises easily, enlarged lymph nodes, tender lymph nodes                                          ENDOCRINE:  Denies cold intolerance, heat intolerance, polydipsia                                                                        Vital Signs Last 24 Hrs  T(C): 36.4 (05 Sep 2019 06:51), Max: 36.9 (05 Sep 2019 06:07)  T(F): 97.6 (05 Sep 2019 06:51), Max: 98.4 (05 Sep 2019 06:07)  HR: 65 (05 Sep 2019 06:07) (55 - 65)  BP: 150/81 (05 Sep 2019 06:07) (121/68 - 150/81)  BP(mean): --  RR: 18 (05 Sep 2019 06:07) (18 - 18)  SpO2: 95% (05 Sep 2019 06:07) (94% - 98%)    Physical Exam (Need 8)  CONSTITUTIONAL:                                                                          WNL  NEURO:                                                                                             WNL                      EYES:                                                                                                  WNL  ENMT:                                                                                                WNL  CV:                                                                                                      WNL  RESPIRATORY:                                                                                  WNL  GI:                                                                                                       WNL  : BARRAZA + / -                                                                                 WNL  MUSKULOSKELETAL:                                                                       WNL  SKIN / BREAST:                                                                                 WNL                                                          LABS:                        14.1   8.59  )-----------( 169      ( 04 Sep 2019 19:32 )             42.5         141  |  104  |  13  ----------------------------<  114<H>  4.2   |  25  |  0.89    Ca    9.5      04 Sep 2019 19:32  Mg     1.9         TPro  8.2  /  Alb  4.1  /  TBili  0.5  /  DBili  <0.2  /  AST  26  /  ALT  16  /  AlkPhos  63  -    PT/INR - ( 04 Sep 2019 15:56 )   PT: 13.6 sec;   INR: 1.20          PTT - ( 04 Sep 2019 15:56 )  PTT:32.5 sec    CARDIAC MARKERS ( 04 Sep 2019 15:56 )  x     / x     / 158 U/L / x     / 2.6 ng/mL          RADIOLOGY & ADDITIONAL STUDIES:  CAROTID U/S:    CXR:    CT Scan:    EK Lead EKG: Sinus Bradycardia @ 59 BPM, Left Axis Deviation, TWI III, II, V5, V6    TTE :    Cardiac Cath: Surgeon: Yosef    Requesting Physician: Anny    HISTORY OF PRESENT ILLNESS:    73 year old male, former smoker, fomer drug and ETOH abuse, with FHx of CAD (MI mother: age 80s), PMH of HTN, DM2, Asthma (denies any hospitalization/intubation);  who was referred to cardiologist Dr. Santos by PCP for abnormal ECG.  Patient reports that 2 weeks ago he was in the bus with his wife when he started to sweat profusely with associated mid-sternal; non-radiating chest tightness he describes it as pulling. He thought he had indigestion and reports feeling better after having seltzer water. His wife was concerned; thus they went to see his PCP who did a EKG and referred the pt. to Dr. Santos.     Patient reports that was the only episode he had so far; denies any CP and SOB at baseline, dizziness, diaphoresis, palpitations, fatigue, LE edema, orthopnea, PND, syncope. He reports that he is very active and works 5 days/week. Subsequently; Pt had a ECHO 19 revealing EF 40-45 %; basal mid and distal inferior wall hypokinesis. EKG as per MD note revealed inferior infarction.  Due to pts risk factors, CCS Angina Class 4 Sx, abnormal EKG and ECHO , pt was referred for cardiac catheterization via right radial on 19 which revealed 2VD, mild LV hypokinesis, EF 50%, 90% dRCA, 70% RPDA, 30-50% dLM, 90% oLAD, 80% mLAD, 30-50% pLCx R.     Of note: patient was started on Metoprolol 25 mg QD; Plavix 75 mg daily (first dose 19) and Atorvastatin 80 mg once a day by DR. Santos last week in the office;      PAST MEDICAL & SURGICAL HISTORY:  Hypertension  DM2  Asthma (denies hospitalization/intubations)  S/P cataract surgery  H/O shoulder surgery: B/L  H/O total knee replacement, right       MEDICATIONS  (STANDING):  aspirin enteric coated 81 milliGRAM(s) Oral daily  atorvastatin 80 milliGRAM(s) Oral at bedtime  clopidogrel Tablet 75 milliGRAM(s) Oral daily  fluticasone propionate 50 MICROgram(s)/spray Nasal Spray 1 Spray(s) Both Nostrils daily  insulin lispro (HumaLOG) corrective regimen sliding scale   SubCutaneous Before meals and at bedtime  metoprolol succinate ER 25 milliGRAM(s) Oral daily    MEDICATIONS  (PRN):  ALBUTerol    90 MICROgram(s) HFA Inhaler 2 Puff(s) Inhalation every 6 hours PRN Shortness of Breath and/or Wheezing  dextrose 40% Gel 15 Gram(s) Oral once PRN Blood Glucose LESS THAN 70 milliGRAM(s)/deciliter  glucagon  Injectable 1 milliGRAM(s) IntraMuscular once PRN Glucose LESS THAN 70 milligrams/deciliter      Allergies    No Known Allergies      SOCIAL HISTORY:  Smoker:  Former, quit 1970s; smoked 1-2 PPD X 24 years  ETOH use:  Former  Ilicit Drug use:  Former used marijuana/cocaine/heroine X 5- 10 years      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:    FAMILY HISTORY:  FH: myocardial infarction: mother: age 80s      Review of Systems (Need 10):  CONSTITUTIONAL: Denies fevers / chills, sweats, fatigue, weight loss, weight gain                                       NEURO:  Denies parathesias, seizures, syncope, confusion                                                                                  EYES:  Denies blurry vision, discharge, pain, loss of vision                                                                                    ENMT:  Denies difficulty hearing, vertigo, dysphagia, epistaxis, recent dental work                                       CV:  per HPI                                                                                           RESPIRATORY:  Denies Wheezing, SOB, cough / sputum, hemoptysis                                                               GI:  Denies nausea, vomiting, diarrhea, constipation, melena                                                                          : Denies hematuria, dysuria, urgency, incontinence                                                                                          MUSCULOSKELETAL  Denies arthritis, joint swelling, muscle weakness                                                             SKIN/BREAST:  Denies rash, itching,                                                                                             PSYCH:  Denies depression, anxiety, suicidal ideation                                                                                                HEME/LYMPH:  Denies bruises easily, enlarged lymph nodes, tender lymph nodes                                          ENDOCRINE:  Denies cold intolerance, heat intolerance, polydipsia                                                                        Vital Signs Last 24 Hrs  T(C): 36.4 (05 Sep 2019 06:51), Max: 36.9 (05 Sep 2019 06:07)  T(F): 97.6 (05 Sep 2019 06:51), Max: 98.4 (05 Sep 2019 06:07)  HR: 65 (05 Sep 2019 06:07) (55 - 65)  BP: 150/81 (05 Sep 2019 06:07) (121/68 - 150/81)  BP(mean): --  RR: 18 (05 Sep 2019 06:07) (18 - 18)  SpO2: 95% (05 Sep 2019 06:07) (94% - 98%)    Physical Exam   CONSTITUTIONAL:                                                                          WNL  NEURO:                                                                                             WNL                      EYES:                                                                                                  WNL  ENMT:                                                                                                WNL  CV:                                                                                                      WNL  RESPIRATORY:                                                                                  WNL  GI:                                                                                                       WNL  : BARRAZA + / -                                                                                 WNL  MUSKULOSKELETAL:                                                                       WNL  SKIN / BREAST:                                                                                 WNL                                                          LABS:                        14.1   8.59  )-----------( 169      ( 04 Sep 2019 19:32 )             42.5         141  |  104  |  13  ----------------------------<  114<H>  4.2   |  25  |  0.89    Ca    9.5      04 Sep 2019 19:32  Mg     1.9         TPro  8.2  /  Alb  4.1  /  TBili  0.5  /  DBili  <0.2  /  AST  26  /  ALT  16  /  AlkPhos  63      PT/INR - ( 04 Sep 2019 15:56 )   PT: 13.6 sec;   INR: 1.20          PTT - ( 04 Sep 2019 15:56 )  PTT:32.5 sec    CARDIAC MARKERS ( 04 Sep 2019 15:56 )  x     / x     / 158 U/L / x     / 2.6 ng/mL          RADIOLOGY & ADDITIONAL STUDIES:    CAROTID U/S: done, results pending    CXR:  Clear    EK Lead EKG: Sinus Bradycardia @ 59 BPM, Left Axis Deviation, TWI III, II, V5, V6    TTE : 19: in chart EF 40-45%, grade I diastolic dysfunction, normal valve structures and doppler flows. No preicardial effusion.     Cardiac Cath:  s/p diagnostic cath, 2VD, mild LV hypokinesis, EF 50%, 90% dRCA, 70% RPDA, 30-50% dLM, 90% oLAD, 80% mLAD, 30-50% pLCx, R radial @ 9pm Surgeon: Yosef    Requesting Physician: Anny    HISTORY OF PRESENT ILLNESS:    73 year old male, former smoker, fomer drug and ETOH abuse, with FHx of CAD (MI mother: age 80s), PMH of HTN, DM2, Asthma (denies any hospitalization/intubation);  who was referred to cardiologist Dr. Santos by PCP for abnormal ECG.  Patient reports that 2 weeks ago he was in the bus with his wife when he started to sweat profusely with associated mid-sternal; non-radiating chest tightness he describes it as pulling. He thought he had indigestion and reports feeling better after having seltzer water. His wife was concerned; thus they went to see his PCP who did a EKG and referred the pt. to Dr. Santos.     Patient reports that was the only episode he had so far; denies any CP and SOB at baseline, dizziness, diaphoresis, palpitations, fatigue, LE edema, orthopnea, PND, syncope. He reports that he is very active and works 5 days/week. Subsequently; Pt had a ECHO 19 revealing EF 40-45 %; basal mid and distal inferior wall hypokinesis. EKG as per MD note revealed inferior infarction.  Due to pts risk factors, CCS Angina Class 4 Sx, abnormal EKG and ECHO , pt was referred for cardiac catheterization via right radial on 19 which revealed 2VD, mild LV hypokinesis, EF 50%, 90% dRCA, 70% RPDA, 30-50% dLM, 90% oLAD, 80% mLAD, 30-50% pLCx R.     Of note: patient was started on Metoprolol 25 mg QD; Plavix 75 mg daily (first dose 19) and Atorvastatin 80 mg once a day by DR. Santos last week in the office;      PAST MEDICAL & SURGICAL HISTORY:  Hypertension  DM2  Asthma (denies hospitalization/intubations)  S/P cataract surgery  H/O shoulder surgery: B/L  H/O total knee replacement, right       MEDICATIONS  (STANDING):  aspirin enteric coated 81 milliGRAM(s) Oral daily  atorvastatin 80 milliGRAM(s) Oral at bedtime  clopidogrel Tablet 75 milliGRAM(s) Oral daily  fluticasone propionate 50 MICROgram(s)/spray Nasal Spray 1 Spray(s) Both Nostrils daily  insulin lispro (HumaLOG) corrective regimen sliding scale   SubCutaneous Before meals and at bedtime  metoprolol succinate ER 25 milliGRAM(s) Oral daily    MEDICATIONS  (PRN):  ALBUTerol    90 MICROgram(s) HFA Inhaler 2 Puff(s) Inhalation every 6 hours PRN Shortness of Breath and/or Wheezing  dextrose 40% Gel 15 Gram(s) Oral once PRN Blood Glucose LESS THAN 70 milliGRAM(s)/deciliter  glucagon  Injectable 1 milliGRAM(s) IntraMuscular once PRN Glucose LESS THAN 70 milligrams/deciliter      Allergies    No Known Allergies      SOCIAL HISTORY:  Smoker:  Former, quit 1970s; smoked 1-2 PPD X 24 years  ETOH use:  Former  Ilicit Drug use:  Former used marijuana/cocaine/heroine X 5- 10 years      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:    FAMILY HISTORY:  FH: myocardial infarction: mother: age 80s      Review of Systems (Need 10):  CONSTITUTIONAL: Denies fevers / chills, sweats, fatigue, weight loss, weight gain                                       NEURO:  Denies parathesias, seizures, syncope, confusion                                                                                  EYES:  Denies blurry vision, discharge, pain, loss of vision                                                                                    ENMT:  Denies difficulty hearing, vertigo, dysphagia, epistaxis, recent dental work                                       CV:  per HPI                                                                                           RESPIRATORY:  Denies Wheezing, SOB, cough / sputum, hemoptysis                                                               GI:  Denies nausea, vomiting, diarrhea, constipation, melena                                                                          : Denies hematuria, dysuria, urgency, incontinence                                                                                          MUSCULOSKELETAL  Denies arthritis, joint swelling, muscle weakness                                                             SKIN/BREAST:  Denies rash, itching,                                                                                             PSYCH:  Denies depression, anxiety, suicidal ideation                                                                                                HEME/LYMPH:  Denies bruises easily, enlarged lymph nodes, tender lymph nodes                                          ENDOCRINE:  Denies cold intolerance, heat intolerance, polydipsia                                                                        Vital Signs Last 24 Hrs  T(C): 36.4 (05 Sep 2019 06:51), Max: 36.9 (05 Sep 2019 06:07)  T(F): 97.6 (05 Sep 2019 06:51), Max: 98.4 (05 Sep 2019 06:07)  HR: 65 (05 Sep 2019 06:07) (55 - 65)  BP: 150/81 (05 Sep 2019 06:07) (121/68 - 150/81)  BP(mean): --  RR: 18 (05 Sep 2019 06:07) (18 - 18)  SpO2: 95% (05 Sep 2019 06:07) (94% - 98%)    Physical Exam  CONSTITUTIONAL: NAD  NEURO: A&O x 3, non focal                    EYES: PERRL, EOMI  ENMT: Hearing grossly intact, oropharynx benign  CV: RRR, no m/r/g  RESPIRATORY: CTABL  GI: + BS, soft, non tender  : no turner  MUSCULOSKELETAL: 5/5 and equal in bilateral lower and upper extremities  Vasc: Carotids, radials, femorals, DP 2 + bilaterally  SKIN / BREAST: no lesions or rashes      LABS:                        14.1   8.59  )-----------( 169      ( 04 Sep 2019 19:32 )             42.5     -    141  |  104  |  13  ----------------------------<  114<H>  4.2   |  25  |  0.89    Ca    9.5      04 Sep 2019 19:32  Mg     1.9         TPro  8.2  /  Alb  4.1  /  TBili  0.5  /  DBili  <0.2  /  AST  26  /  ALT  16  /  AlkPhos  63  -    PT/INR - ( 04 Sep 2019 15:56 )   PT: 13.6 sec;   INR: 1.20          PTT - ( 04 Sep 2019 15:56 )  PTT:32.5 sec    CARDIAC MARKERS ( 04 Sep 2019 15:56 )  x     / x     / 158 U/L / x     / 2.6 ng/mL          RADIOLOGY & ADDITIONAL STUDIES:    CAROTID U/S: done, results pending    CXR:  Clear    EK Lead EKG: Sinus Bradycardia @ 59 BPM, Left Axis Deviation, TWI III, II, V5, V6    TTE : 19: in chart EF 40-45%, grade I diastolic dysfunction, normal valve structures and doppler flows. No preicardial effusion.     Cardiac Cath:  s/p diagnostic cath, 2VD, mild LV hypokinesis, EF 50%, 90% dRCA, 70% RPDA, 30-50% dLM, 90% oLAD, 80% mLAD, 30-50% pLCx, R radial @ 9pm

## 2019-09-05 NOTE — PROGRESS NOTE ADULT - PROBLEM SELECTOR PLAN 2
Hx of HTN. Blood pressure well managed on current regimen.   - Continue with medical management as above

## 2019-09-05 NOTE — PROGRESS NOTE ADULT - SUBJECTIVE AND OBJECTIVE BOX
OVERNIGHT EVENTS:    SUBJECTIVE / INTERVAL HPI: Patient seen and examined at bedside.     VITAL SIGNS:  Vital Signs Last 24 Hrs  T(C): 36.4 (05 Sep 2019 06:51), Max: 36.9 (05 Sep 2019 06:07)  T(F): 97.6 (05 Sep 2019 06:51), Max: 98.4 (05 Sep 2019 06:07)  HR: 65 (05 Sep 2019 06:07) (55 - 65)  BP: 150/81 (05 Sep 2019 06:07) (121/68 - 150/81)  BP(mean): --  RR: 18 (05 Sep 2019 06:07) (18 - 18)  SpO2: 95% (05 Sep 2019 06:07) (94% - 98%)    PHYSICAL EXAM:    General: WDWN  HEENT: NC/AT; PERRL, anicteric sclera; MMM  Neck: supple  Cardiovascular: +S1/S2; RRR  Respiratory: CTA B/L; no W/R/R  Gastrointestinal: soft, NT/ND; +BSx4  Extremities: WWP; no edema, clubbing or cyanosis  Vascular: 2+ radial, DP/PT pulses B/L  Neurological: AAOx3; no focal deficits    MEDICATIONS:  MEDICATIONS  (STANDING):  ammonium lactate 12% Lotion 1 Application(s) Topical two times a day  aspirin enteric coated 81 milliGRAM(s) Oral daily  atorvastatin 80 milliGRAM(s) Oral at bedtime  chlorhexidine 4% Liquid 1 Application(s) Topical once  clopidogrel Tablet 75 milliGRAM(s) Oral daily  dextrose 5%. 1000 milliLiter(s) (50 mL/Hr) IV Continuous <Continuous>  dextrose 50% Injectable 12.5 Gram(s) IV Push once  dextrose 50% Injectable 25 Gram(s) IV Push once  dextrose 50% Injectable 25 Gram(s) IV Push once  fluticasone propionate 50 MICROgram(s)/spray Nasal Spray 1 Spray(s) Both Nostrils daily  insulin lispro (HumaLOG) corrective regimen sliding scale   SubCutaneous Before meals and at bedtime  lisinopril 2.5 milliGRAM(s) Oral daily  metoprolol succinate ER 25 milliGRAM(s) Oral daily  sodium chloride 0.9%. 1000 milliLiter(s) (75 mL/Hr) IV Continuous <Continuous>    MEDICATIONS  (PRN):  ALBUTerol    90 MICROgram(s) HFA Inhaler 2 Puff(s) Inhalation every 6 hours PRN Shortness of Breath and/or Wheezing  dextrose 40% Gel 15 Gram(s) Oral once PRN Blood Glucose LESS THAN 70 milliGRAM(s)/deciliter  glucagon  Injectable 1 milliGRAM(s) IntraMuscular once PRN Glucose LESS THAN 70 milligrams/deciliter      ALLERGIES:  Allergies    No Known Allergies    Intolerances        LABS:                        14.1   8.59  )-----------( 169      ( 04 Sep 2019 19:32 )             42.5     09-04    141  |  104  |  13  ----------------------------<  114<H>  4.2   |  25  |  0.89    Ca    9.5      04 Sep 2019 19:32  Mg     1.9     09-04    TPro  8.2  /  Alb  4.1  /  TBili  0.5  /  DBili  <0.2  /  AST  26  /  ALT  16  /  AlkPhos  63  09-04    PT/INR - ( 04 Sep 2019 15:56 )   PT: 13.6 sec;   INR: 1.20          PTT - ( 04 Sep 2019 15:56 )  PTT:32.5 sec    CAPILLARY BLOOD GLUCOSE      POCT Blood Glucose.: 145 mg/dL (05 Sep 2019 07:48)      RADIOLOGY & ADDITIONAL TESTS: Reviewed.    ASSESSMENT:    PLAN: OVERNIGHT EVENTS: AMI     SUBJECTIVE / INTERVAL HPI: Patient seen and examined at bedside. Reports feeling well today. Denies chest pain/SOB. Asymptomatic. All other ROS negative.     VITAL SIGNS:  Vital Signs Last 24 Hrs  T(C): 36.4 (05 Sep 2019 06:51), Max: 36.9 (05 Sep 2019 06:07)  T(F): 97.6 (05 Sep 2019 06:51), Max: 98.4 (05 Sep 2019 06:07)  HR: 65 (05 Sep 2019 06:07) (55 - 65)  BP: 150/81 (05 Sep 2019 06:07) (121/68 - 150/81)  BP(mean): --  RR: 18 (05 Sep 2019 06:07) (18 - 18)  SpO2: 95% (05 Sep 2019 06:07) (94% - 98%)    PHYSICAL EXAM:    General: WDWN male resting in bed   HEENT: NC/AT; PERRL, anicteric sclera; MMM  Neck: supple  Cardiovascular: +S1/S2; RRR  Respiratory: CTA B/L; no W/R/R  Gastrointestinal: soft, NT/ND; +BSx4  Extremities: WWP; no edema, clubbing or cyanosis  Vascular: 2+ radial, DP/PT pulses B/L  Neurological: AAOx3; no focal deficits    MEDICATIONS:  MEDICATIONS  (STANDING):  ammonium lactate 12% Lotion 1 Application(s) Topical two times a day  aspirin enteric coated 81 milliGRAM(s) Oral daily  atorvastatin 80 milliGRAM(s) Oral at bedtime  chlorhexidine 4% Liquid 1 Application(s) Topical once  clopidogrel Tablet 75 milliGRAM(s) Oral daily  dextrose 5%. 1000 milliLiter(s) (50 mL/Hr) IV Continuous <Continuous>  dextrose 50% Injectable 12.5 Gram(s) IV Push once  dextrose 50% Injectable 25 Gram(s) IV Push once  dextrose 50% Injectable 25 Gram(s) IV Push once  fluticasone propionate 50 MICROgram(s)/spray Nasal Spray 1 Spray(s) Both Nostrils daily  insulin lispro (HumaLOG) corrective regimen sliding scale   SubCutaneous Before meals and at bedtime  lisinopril 2.5 milliGRAM(s) Oral daily  metoprolol succinate ER 25 milliGRAM(s) Oral daily  sodium chloride 0.9%. 1000 milliLiter(s) (75 mL/Hr) IV Continuous <Continuous>    MEDICATIONS  (PRN):  ALBUTerol    90 MICROgram(s) HFA Inhaler 2 Puff(s) Inhalation every 6 hours PRN Shortness of Breath and/or Wheezing  dextrose 40% Gel 15 Gram(s) Oral once PRN Blood Glucose LESS THAN 70 milliGRAM(s)/deciliter  glucagon  Injectable 1 milliGRAM(s) IntraMuscular once PRN Glucose LESS THAN 70 milligrams/deciliter      ALLERGIES:  Allergies    No Known Allergies    Intolerances        LABS:                        14.1   8.59  )-----------( 169      ( 04 Sep 2019 19:32 )             42.5     09-04    141  |  104  |  13  ----------------------------<  114<H>  4.2   |  25  |  0.89    Ca    9.5      04 Sep 2019 19:32  Mg     1.9     09-04    TPro  8.2  /  Alb  4.1  /  TBili  0.5  /  DBili  <0.2  /  AST  26  /  ALT  16  /  AlkPhos  63  09-04    PT/INR - ( 04 Sep 2019 15:56 )   PT: 13.6 sec;   INR: 1.20          PTT - ( 04 Sep 2019 15:56 )  PTT:32.5 sec    CAPILLARY BLOOD GLUCOSE      POCT Blood Glucose.: 145 mg/dL (05 Sep 2019 07:48)      RADIOLOGY & ADDITIONAL TESTS: Reviewed.    ASSESSMENT:    PLAN:

## 2019-09-05 NOTE — PROGRESS NOTE ADULT - ASSESSMENT
Assessment and Plan:    Assessment:  · Assessment	  74 y/o M FORMER SMOKER/FORMER DRUG USE/FORMER ETOH USE with FHx of CAD (MI mother: age 80s) PMH of HTN, DM, asthma (denies any hospitalization/intubation) presents for cardiac catheterization with possible intervention if clinically indicated due to pts risk factors, CCS Angina Class 4 Sx, abnormal EKG and ECHO , pt is referred for cardiac catheterization w/ possible intervention.     -Pt took ASA 81 mg this AM and Plavix 75 mg this AM. Has remained compliant with regimen since prescription 8/23/2019.  -NS @ 50 cc/hour.     Risks & benefits of procedure and alternative therapy have been explained to the patient including but not limited to: allergic reaction, bleeding w/possible need for blood transfusion, infection, renal and vascular compromise, limb damage, arrhythmia, stroke, vessel dissection/perforation, Myocardial infarction, emergent CABG. Informed consent obtained and in chart. 74 y/o M FORMER SMOKER/FORMER DRUG USE/FORMER ETOH USE with FHx of CAD (MI mother: age 80s) PMH of HTN, DM, asthma (denies any hospitalization/intubation) presents for cardiac catheterization with possible intervention if clinically indicated due to pts risk factors, CCS Angina Class 4 Sx, abnormal EKG and ECHO , pt is referred for cardiac catheterization w/ possible intervention.       Problem #1: CAD  -Plan for CABG in am with Dr. Navas, please NPO at midnight  -Obtain pre-surgical testing: including Carotids, TTE, Type and screen x 2    Problem #2: Former IVDU  -Obtain urine toxicology  -Obtain HIV, Hepatitis panel    Problem #3 Former ETOH  -Consider MV, Folate, Thiamine 74 y/o M FORMER SMOKER/FORMER DRUG USE/FORMER ETOH USE with FHx of CAD (MI mother: age 80s) PMH of HTN, DM, asthma (denies any hospitalization/intubation) presents for cardiac catheterization with possible intervention if clinically indicated due to pts risk factors, CCS Angina Class 4 Sx, abnormal EKG and ECHO , pt is referred for cardiac catheterization w/ possible intervention.       Problem #1: CAD  -Plan for CABG in am with Dr. Navas, please NPO at midnight  -Obtain pre-surgical testing: including Carotids, TTE, Type and screen x 2    Hypertension:  -Cont betablocker, and give morning of surgery if tolerates  -Hold ACE-I prior to cardiac surgery    Problem #2: Former IVDU  -Obtain urine toxicology  -Obtain HIV, Hepatitis panel    Problem #3 Former ETOH  -Consider MV, Folate, Thiamine 74 y/o M FORMER SMOKER/FORMER DRUG USE/FORMER ETOH USE with FHx of CAD (MI mother: age 80s) PMH of HTN, DM, asthma (denies any hospitalization/intubation) presents for cardiac catheterization with possible intervention if clinically indicated due to pts risk factors, CCS Angina Class 4 Sx, abnormal EKG and ECHO , pt is referred for cardiac catheterization w/ possible intervention.       Problem #1: CAD  -Plan for CABG in am with Dr. Navas, please NPO at midnight  -Bilateral radial artery tested and suitable for use in CABG as conduit.   -Obtain pre-surgical testing: including Carotids, TTE, Type and screen x 2    Hypertension:  -Cont betablocker, and give morning of surgery if tolerates  -Hold ACE-I prior to cardiac surgery    Problem #2: Former IVDU  -Obtain urine toxicology  -Obtain HIV, Hepatitis panel    Problem #3 Former ETOH  -Consider MV, Folate, Thiamine 72 y/o M FORMER SMOKER/FORMER DRUG USE/FORMER ETOH USE with FHx of CAD (MI mother: age 80s) PMH of HTN, DM, asthma (denies any hospitalization/intubation) presents for cardiac catheterization with possible intervention if clinically indicated due to pts risk factors, CCS Angina Class 4 Sx, abnormal EKG and ECHO , pt is referred for cardiac catheterization w/ possible intervention.       Problem #1: CAD  -Plan for CABG in am with Dr. Navas, please NPO at midnight  -Bilateral radial artery tested and suitable for use in CABG as conduit.   -Obtain pre-surgical testing: including Carotids, TTE, Type and screen x 2    Problem #2: Hypertension:  -Cont betablocker, and give morning of surgery if tolerates  -Hold ACE-I prior to cardiac surgery    Problem #3: Former IVDU  -Obtain urine toxicology  -Obtain HIV, Hepatitis panel    Problem #4 Former ETOH  -Consider MV, Folate, Thiamine    Dispo:    Or tomorrow. 74 y/o M FORMER SMOKER/FORMER DRUG USE/FORMER ETOH USE with FHx of CAD (MI mother: age 80s) PMH of HTN, DM, asthma (denies any hospitalization/intubation) presents for cardiac catheterization with possible intervention if clinically indicated due to pts risk factors, CCS Angina Class 4 Sx, abnormal EKG and ECHO , pt is referred for cardiac catheterization w/ possible intervention.       Problem #1: CAD  -Plan for CABG in am with Dr. Navas, please NPO at midnight  -Bilateral radial artery tested and suitable for use in CABG as conduit.   -Obtain pre-surgical testing: including Carotids, TTE, Type and screen x 2    Problem #2: Hypertension:  -Cont betablocker, and give morning of surgery if tolerates  -Hold ACE-I prior to cardiac surgery    Problem #3: Former IVDU  -Obtain urine toxicology  -Obtain HIV, Hepatitis panel    Problem #4 Former ETOH  -Consider MV, Folate, Thiamine    Dispo:  Or tomorrow. 72 y/o M FORMER SMOKER/FORMER DRUG USE/FORMER ETOH USE with FHx of CAD (MI mother: age 80s) PMH of HTN, DM, asthma (denies any hospitalization/intubation) presents for cardiac catheterization with possible intervention if clinically indicated due to pts risk factors, CCS Angina Class 4 Sx, abnormal EKG and ECHO , pt is referred for cardiac catheterization w/ possible intervention.       Problem #1: CAD  -Plan for CABG in am with Dr. Navas, please NPO at midnight  -Bilateral radial artery tested and suitable for use in CABG as conduit.   -Obtain pre-surgical testing: including Carotids, TTE, Type and screen x 2    Problem #2: Hypertension:  -Cont betablocker, and give morning of surgery if tolerates  -Hold ACE-I prior to cardiac surgery    Problem #3: Former IVDU  -Obtain urine toxicology  -Obtain HIV, Hepatitis panel    Problem #4 Former ETOH  -Consider MV, Folate, Thiamine    Dispo:  Or tomorrow.     I have reviewed clinical labs tests and reports, radiology tests and reports, as well as old patient medical records, and discussed with the refering physician.

## 2019-09-06 ENCOUNTER — APPOINTMENT (OUTPATIENT)
Dept: CARDIOTHORACIC SURGERY | Facility: HOSPITAL | Age: 74
End: 2019-09-06

## 2019-09-06 LAB
ALBUMIN SERPL ELPH-MCNC: 2.8 G/DL — LOW (ref 3.3–5)
ALBUMIN SERPL ELPH-MCNC: 2.8 G/DL — LOW (ref 3.3–5)
ALBUMIN SERPL ELPH-MCNC: 3 G/DL — LOW (ref 3.3–5)
ALP SERPL-CCNC: 36 U/L — LOW (ref 40–120)
ALP SERPL-CCNC: 47 U/L — SIGNIFICANT CHANGE UP (ref 40–120)
ALP SERPL-CCNC: 52 U/L — SIGNIFICANT CHANGE UP (ref 40–120)
ALT FLD-CCNC: 11 U/L — SIGNIFICANT CHANGE UP (ref 10–45)
ALT FLD-CCNC: 13 U/L — SIGNIFICANT CHANGE UP (ref 10–45)
ALT FLD-CCNC: 13 U/L — SIGNIFICANT CHANGE UP (ref 10–45)
ANION GAP SERPL CALC-SCNC: 12 MMOL/L — SIGNIFICANT CHANGE UP (ref 5–17)
ANION GAP SERPL CALC-SCNC: 13 MMOL/L — SIGNIFICANT CHANGE UP (ref 5–17)
ANION GAP SERPL CALC-SCNC: 9 MMOL/L — SIGNIFICANT CHANGE UP (ref 5–17)
ANION GAP SERPL CALC-SCNC: 9 MMOL/L — SIGNIFICANT CHANGE UP (ref 5–17)
APTT BLD: 26.5 SEC — LOW (ref 27.5–36.3)
APTT BLD: 28 SEC — SIGNIFICANT CHANGE UP (ref 27.5–36.3)
APTT BLD: 28 SEC — SIGNIFICANT CHANGE UP (ref 27.5–36.3)
APTT BLD: 32.9 SEC — SIGNIFICANT CHANGE UP (ref 27.5–36.3)
AST SERPL-CCNC: 28 U/L — SIGNIFICANT CHANGE UP (ref 10–40)
AST SERPL-CCNC: 28 U/L — SIGNIFICANT CHANGE UP (ref 10–40)
AST SERPL-CCNC: 29 U/L — SIGNIFICANT CHANGE UP (ref 10–40)
BASOPHILS # BLD AUTO: 0.03 K/UL — SIGNIFICANT CHANGE UP (ref 0–0.2)
BASOPHILS NFR BLD AUTO: 0.2 % — SIGNIFICANT CHANGE UP (ref 0–2)
BILIRUB DIRECT SERPL-MCNC: <0.2 MG/DL — SIGNIFICANT CHANGE UP (ref 0–0.2)
BILIRUB INDIRECT FLD-MCNC: >0.6 MG/DL — SIGNIFICANT CHANGE UP (ref 0.2–1)
BILIRUB SERPL-MCNC: 0.3 MG/DL — SIGNIFICANT CHANGE UP (ref 0.2–1.2)
BILIRUB SERPL-MCNC: 0.7 MG/DL — SIGNIFICANT CHANGE UP (ref 0.2–1.2)
BILIRUB SERPL-MCNC: 0.8 MG/DL — SIGNIFICANT CHANGE UP (ref 0.2–1.2)
BLD GP AB SCN SERPL QL: NEGATIVE — SIGNIFICANT CHANGE UP
BUN SERPL-MCNC: 11 MG/DL — SIGNIFICANT CHANGE UP (ref 7–23)
BUN SERPL-MCNC: 13 MG/DL — SIGNIFICANT CHANGE UP (ref 7–23)
BUN SERPL-MCNC: 13 MG/DL — SIGNIFICANT CHANGE UP (ref 7–23)
BUN SERPL-MCNC: 16 MG/DL — SIGNIFICANT CHANGE UP (ref 7–23)
CALCIUM SERPL-MCNC: 8 MG/DL — LOW (ref 8.4–10.5)
CALCIUM SERPL-MCNC: 8.3 MG/DL — LOW (ref 8.4–10.5)
CALCIUM SERPL-MCNC: 8.4 MG/DL — SIGNIFICANT CHANGE UP (ref 8.4–10.5)
CALCIUM SERPL-MCNC: 9.3 MG/DL — SIGNIFICANT CHANGE UP (ref 8.4–10.5)
CHLORIDE SERPL-SCNC: 104 MMOL/L — SIGNIFICANT CHANGE UP (ref 96–108)
CHLORIDE SERPL-SCNC: 105 MMOL/L — SIGNIFICANT CHANGE UP (ref 96–108)
CO2 SERPL-SCNC: 20 MMOL/L — LOW (ref 22–31)
CO2 SERPL-SCNC: 21 MMOL/L — LOW (ref 22–31)
CO2 SERPL-SCNC: 24 MMOL/L — SIGNIFICANT CHANGE UP (ref 22–31)
CO2 SERPL-SCNC: 28 MMOL/L — SIGNIFICANT CHANGE UP (ref 22–31)
CREAT SERPL-MCNC: 0.83 MG/DL — SIGNIFICANT CHANGE UP (ref 0.5–1.3)
CREAT SERPL-MCNC: 0.88 MG/DL — SIGNIFICANT CHANGE UP (ref 0.5–1.3)
CREAT SERPL-MCNC: 0.89 MG/DL — SIGNIFICANT CHANGE UP (ref 0.5–1.3)
CREAT SERPL-MCNC: 1.1 MG/DL — SIGNIFICANT CHANGE UP (ref 0.5–1.3)
EOSINOPHIL # BLD AUTO: 0 K/UL — SIGNIFICANT CHANGE UP (ref 0–0.5)
EOSINOPHIL NFR BLD AUTO: 0 % — SIGNIFICANT CHANGE UP (ref 0–6)
GAS PNL BLDA: SIGNIFICANT CHANGE UP
GLUCOSE BLDC GLUCOMTR-MCNC: 107 MG/DL — HIGH (ref 70–99)
GLUCOSE BLDC GLUCOMTR-MCNC: 138 MG/DL — HIGH (ref 70–99)
GLUCOSE BLDC GLUCOMTR-MCNC: 153 MG/DL — HIGH (ref 70–99)
GLUCOSE BLDC GLUCOMTR-MCNC: 184 MG/DL — HIGH (ref 70–99)
GLUCOSE BLDC GLUCOMTR-MCNC: 197 MG/DL — HIGH (ref 70–99)
GLUCOSE BLDC GLUCOMTR-MCNC: 212 MG/DL — HIGH (ref 70–99)
GLUCOSE BLDC GLUCOMTR-MCNC: 99 MG/DL — SIGNIFICANT CHANGE UP (ref 70–99)
GLUCOSE SERPL-MCNC: 113 MG/DL — HIGH (ref 70–99)
GLUCOSE SERPL-MCNC: 118 MG/DL — HIGH (ref 70–99)
GLUCOSE SERPL-MCNC: 216 MG/DL — HIGH (ref 70–99)
GLUCOSE SERPL-MCNC: 237 MG/DL — HIGH (ref 70–99)
HCT VFR BLD CALC: 27 % — LOW (ref 39–50)
HCT VFR BLD CALC: 33.5 % — LOW (ref 39–50)
HCT VFR BLD CALC: 37.1 % — LOW (ref 39–50)
HCT VFR BLD CALC: 42.5 % — SIGNIFICANT CHANGE UP (ref 39–50)
HGB BLD-MCNC: 11.3 G/DL — LOW (ref 13–17)
HGB BLD-MCNC: 12.2 G/DL — LOW (ref 13–17)
HGB BLD-MCNC: 14.1 G/DL — SIGNIFICANT CHANGE UP (ref 13–17)
HGB BLD-MCNC: 9.1 G/DL — LOW (ref 13–17)
IMM GRANULOCYTES NFR BLD AUTO: 0.4 % — SIGNIFICANT CHANGE UP (ref 0–1.5)
INR BLD: 1.25 — HIGH (ref 0.88–1.16)
INR BLD: 1.35 — HIGH (ref 0.88–1.16)
INR BLD: 1.43 — HIGH (ref 0.88–1.16)
INR BLD: 1.46 — HIGH (ref 0.88–1.16)
LACTATE SERPL-SCNC: 1.2 MMOL/L — SIGNIFICANT CHANGE UP (ref 0.5–2)
LACTATE SERPL-SCNC: 2.2 MMOL/L — HIGH (ref 0.5–2)
LACTATE SERPL-SCNC: 2.4 MMOL/L — HIGH (ref 0.5–2)
LYMPHOCYTES # BLD AUTO: 0.98 K/UL — LOW (ref 1–3.3)
LYMPHOCYTES # BLD AUTO: 5.8 % — LOW (ref 13–44)
MAGNESIUM SERPL-MCNC: 1.4 MG/DL — LOW (ref 1.6–2.6)
MAGNESIUM SERPL-MCNC: 1.9 MG/DL — SIGNIFICANT CHANGE UP (ref 1.6–2.6)
MAGNESIUM SERPL-MCNC: 2.1 MG/DL — SIGNIFICANT CHANGE UP (ref 1.6–2.6)
MCHC RBC-ENTMCNC: 32.2 PG — SIGNIFICANT CHANGE UP (ref 27–34)
MCHC RBC-ENTMCNC: 32.2 PG — SIGNIFICANT CHANGE UP (ref 27–34)
MCHC RBC-ENTMCNC: 32.6 PG — SIGNIFICANT CHANGE UP (ref 27–34)
MCHC RBC-ENTMCNC: 32.6 PG — SIGNIFICANT CHANGE UP (ref 27–34)
MCHC RBC-ENTMCNC: 32.9 GM/DL — SIGNIFICANT CHANGE UP (ref 32–36)
MCHC RBC-ENTMCNC: 33.2 GM/DL — SIGNIFICANT CHANGE UP (ref 32–36)
MCHC RBC-ENTMCNC: 33.7 GM/DL — SIGNIFICANT CHANGE UP (ref 32–36)
MCHC RBC-ENTMCNC: 33.7 GM/DL — SIGNIFICANT CHANGE UP (ref 32–36)
MCV RBC AUTO: 96.5 FL — SIGNIFICANT CHANGE UP (ref 80–100)
MCV RBC AUTO: 96.8 FL — SIGNIFICANT CHANGE UP (ref 80–100)
MCV RBC AUTO: 97 FL — SIGNIFICANT CHANGE UP (ref 80–100)
MCV RBC AUTO: 97.9 FL — SIGNIFICANT CHANGE UP (ref 80–100)
MONOCYTES # BLD AUTO: 1.34 K/UL — HIGH (ref 0–0.9)
MONOCYTES NFR BLD AUTO: 8 % — SIGNIFICANT CHANGE UP (ref 2–14)
NEUTROPHILS # BLD AUTO: 14.36 K/UL — HIGH (ref 1.8–7.4)
NEUTROPHILS NFR BLD AUTO: 85.6 % — HIGH (ref 43–77)
NRBC # BLD: 0 /100 WBCS — SIGNIFICANT CHANGE UP (ref 0–0)
PHOSPHATE SERPL-MCNC: 2.6 MG/DL — SIGNIFICANT CHANGE UP (ref 2.5–4.5)
PHOSPHATE SERPL-MCNC: 2.9 MG/DL — SIGNIFICANT CHANGE UP (ref 2.5–4.5)
PLATELET # BLD AUTO: 138 K/UL — LOW (ref 150–400)
PLATELET # BLD AUTO: 154 K/UL — SIGNIFICANT CHANGE UP (ref 150–400)
PLATELET # BLD AUTO: 188 K/UL — SIGNIFICANT CHANGE UP (ref 150–400)
PLATELET # BLD AUTO: 194 K/UL — SIGNIFICANT CHANGE UP (ref 150–400)
POTASSIUM SERPL-MCNC: 4 MMOL/L — SIGNIFICANT CHANGE UP (ref 3.5–5.3)
POTASSIUM SERPL-MCNC: 4 MMOL/L — SIGNIFICANT CHANGE UP (ref 3.5–5.3)
POTASSIUM SERPL-MCNC: 4.3 MMOL/L — SIGNIFICANT CHANGE UP (ref 3.5–5.3)
POTASSIUM SERPL-MCNC: 4.9 MMOL/L — SIGNIFICANT CHANGE UP (ref 3.5–5.3)
POTASSIUM SERPL-SCNC: 4 MMOL/L — SIGNIFICANT CHANGE UP (ref 3.5–5.3)
POTASSIUM SERPL-SCNC: 4 MMOL/L — SIGNIFICANT CHANGE UP (ref 3.5–5.3)
POTASSIUM SERPL-SCNC: 4.3 MMOL/L — SIGNIFICANT CHANGE UP (ref 3.5–5.3)
POTASSIUM SERPL-SCNC: 4.9 MMOL/L — SIGNIFICANT CHANGE UP (ref 3.5–5.3)
PROT SERPL-MCNC: 5.4 G/DL — LOW (ref 6–8.3)
PROT SERPL-MCNC: 5.6 G/DL — LOW (ref 6–8.3)
PROT SERPL-MCNC: 5.9 G/DL — LOW (ref 6–8.3)
PROTHROM AB SERPL-ACNC: 14.2 SEC — HIGH (ref 10–12.9)
PROTHROM AB SERPL-ACNC: 15.4 SEC — HIGH (ref 10–12.9)
PROTHROM AB SERPL-ACNC: 16.3 SEC — HIGH (ref 10–12.9)
PROTHROM AB SERPL-ACNC: 16.7 SEC — HIGH (ref 10–12.9)
RBC # BLD: 2.79 M/UL — LOW (ref 4.2–5.8)
RBC # BLD: 3.47 M/UL — LOW (ref 4.2–5.8)
RBC # BLD: 3.79 M/UL — LOW (ref 4.2–5.8)
RBC # BLD: 4.38 M/UL — SIGNIFICANT CHANGE UP (ref 4.2–5.8)
RBC # FLD: 12.7 % — SIGNIFICANT CHANGE UP (ref 10.3–14.5)
RBC # FLD: 12.7 % — SIGNIFICANT CHANGE UP (ref 10.3–14.5)
RBC # FLD: 12.8 % — SIGNIFICANT CHANGE UP (ref 10.3–14.5)
RBC # FLD: 12.9 % — SIGNIFICANT CHANGE UP (ref 10.3–14.5)
RH IG SCN BLD-IMP: POSITIVE — SIGNIFICANT CHANGE UP
SODIUM SERPL-SCNC: 136 MMOL/L — SIGNIFICANT CHANGE UP (ref 135–145)
SODIUM SERPL-SCNC: 137 MMOL/L — SIGNIFICANT CHANGE UP (ref 135–145)
SODIUM SERPL-SCNC: 139 MMOL/L — SIGNIFICANT CHANGE UP (ref 135–145)
SODIUM SERPL-SCNC: 141 MMOL/L — SIGNIFICANT CHANGE UP (ref 135–145)
WBC # BLD: 16.77 K/UL — HIGH (ref 3.8–10.5)
WBC # BLD: 21.84 K/UL — HIGH (ref 3.8–10.5)
WBC # BLD: 6.98 K/UL — SIGNIFICANT CHANGE UP (ref 3.8–10.5)
WBC # BLD: 9.09 K/UL — SIGNIFICANT CHANGE UP (ref 3.8–10.5)
WBC # FLD AUTO: 16.77 K/UL — HIGH (ref 3.8–10.5)
WBC # FLD AUTO: 21.84 K/UL — HIGH (ref 3.8–10.5)
WBC # FLD AUTO: 6.98 K/UL — SIGNIFICANT CHANGE UP (ref 3.8–10.5)
WBC # FLD AUTO: 9.09 K/UL — SIGNIFICANT CHANGE UP (ref 3.8–10.5)

## 2019-09-06 PROCEDURE — 33533 CABG ARTERIAL SINGLE: CPT | Mod: 80

## 2019-09-06 PROCEDURE — 76998 US GUIDE INTRAOP: CPT | Mod: 26,59

## 2019-09-06 PROCEDURE — 33518 CABG ARTERY-VEIN TWO: CPT

## 2019-09-06 PROCEDURE — 76998 US GUIDE INTRAOP: CPT | Mod: 26,80,59

## 2019-09-06 PROCEDURE — 93010 ELECTROCARDIOGRAM REPORT: CPT

## 2019-09-06 PROCEDURE — 99291 CRITICAL CARE FIRST HOUR: CPT

## 2019-09-06 PROCEDURE — 33533 CABG ARTERIAL SINGLE: CPT

## 2019-09-06 PROCEDURE — 71045 X-RAY EXAM CHEST 1 VIEW: CPT | Mod: 26

## 2019-09-06 PROCEDURE — 99232 SBSQ HOSP IP/OBS MODERATE 35: CPT

## 2019-09-06 PROCEDURE — 99292 CRITICAL CARE ADDL 30 MIN: CPT

## 2019-09-06 PROCEDURE — 33518 CABG ARTERY-VEIN TWO: CPT | Mod: 80

## 2019-09-06 RX ORDER — ALBUMIN HUMAN 25 %
250 VIAL (ML) INTRAVENOUS ONCE
Refills: 0 | Status: COMPLETED | OUTPATIENT
Start: 2019-09-06 | End: 2019-09-06

## 2019-09-06 RX ORDER — SODIUM CHLORIDE 9 MG/ML
1000 INJECTION INTRAMUSCULAR; INTRAVENOUS; SUBCUTANEOUS
Refills: 0 | Status: DISCONTINUED | OUTPATIENT
Start: 2019-09-06 | End: 2019-09-09

## 2019-09-06 RX ORDER — HEPARIN SODIUM 5000 [USP'U]/ML
5000 INJECTION INTRAVENOUS; SUBCUTANEOUS EVERY 8 HOURS
Refills: 0 | Status: DISCONTINUED | OUTPATIENT
Start: 2019-09-06 | End: 2019-09-11

## 2019-09-06 RX ORDER — POTASSIUM CHLORIDE 20 MEQ
20 PACKET (EA) ORAL ONCE
Refills: 0 | Status: COMPLETED | OUTPATIENT
Start: 2019-09-06 | End: 2019-09-06

## 2019-09-06 RX ORDER — ACETAMINOPHEN 500 MG
1000 TABLET ORAL ONCE
Refills: 0 | Status: COMPLETED | OUTPATIENT
Start: 2019-09-06 | End: 2019-09-06

## 2019-09-06 RX ORDER — FAMOTIDINE 10 MG/ML
20 INJECTION INTRAVENOUS EVERY 12 HOURS
Refills: 0 | Status: DISCONTINUED | OUTPATIENT
Start: 2019-09-06 | End: 2019-09-07

## 2019-09-06 RX ORDER — CHLORHEXIDINE GLUCONATE 213 G/1000ML
15 SOLUTION TOPICAL EVERY 12 HOURS
Refills: 0 | Status: DISCONTINUED | OUTPATIENT
Start: 2019-09-06 | End: 2019-09-07

## 2019-09-06 RX ORDER — CEFAZOLIN SODIUM 1 G
2000 VIAL (EA) INJECTION EVERY 8 HOURS
Refills: 0 | Status: COMPLETED | OUTPATIENT
Start: 2019-09-06 | End: 2019-09-08

## 2019-09-06 RX ORDER — INSULIN HUMAN 100 [IU]/ML
2 INJECTION, SOLUTION SUBCUTANEOUS
Qty: 100 | Refills: 0 | Status: DISCONTINUED | OUTPATIENT
Start: 2019-09-06 | End: 2019-09-07

## 2019-09-06 RX ORDER — CLOPIDOGREL BISULFATE 75 MG/1
75 TABLET, FILM COATED ORAL DAILY
Refills: 0 | Status: DISCONTINUED | OUTPATIENT
Start: 2019-09-06 | End: 2019-09-11

## 2019-09-06 RX ORDER — FENTANYL CITRATE 50 UG/ML
50 INJECTION INTRAVENOUS ONCE
Refills: 0 | Status: DISCONTINUED | OUTPATIENT
Start: 2019-09-06 | End: 2019-09-06

## 2019-09-06 RX ORDER — INSULIN HUMAN 100 [IU]/ML
1 INJECTION, SOLUTION SUBCUTANEOUS
Qty: 100 | Refills: 0 | Status: DISCONTINUED | OUTPATIENT
Start: 2019-09-06 | End: 2019-09-06

## 2019-09-06 RX ORDER — CHLORHEXIDINE GLUCONATE 213 G/1000ML
1 SOLUTION TOPICAL
Refills: 0 | Status: DISCONTINUED | OUTPATIENT
Start: 2019-09-06 | End: 2019-09-11

## 2019-09-06 RX ORDER — MAGNESIUM SULFATE 500 MG/ML
2 VIAL (ML) INJECTION ONCE
Refills: 0 | Status: COMPLETED | OUTPATIENT
Start: 2019-09-06 | End: 2019-09-06

## 2019-09-06 RX ORDER — FUROSEMIDE 40 MG
10 TABLET ORAL ONCE
Refills: 0 | Status: COMPLETED | OUTPATIENT
Start: 2019-09-06 | End: 2019-09-06

## 2019-09-06 RX ORDER — ASPIRIN/CALCIUM CARB/MAGNESIUM 324 MG
81 TABLET ORAL DAILY
Refills: 0 | Status: DISCONTINUED | OUTPATIENT
Start: 2019-09-06 | End: 2019-09-11

## 2019-09-06 RX ORDER — DEXTROSE 50 % IN WATER 50 %
50 SYRINGE (ML) INTRAVENOUS
Refills: 0 | Status: DISCONTINUED | OUTPATIENT
Start: 2019-09-06 | End: 2019-09-09

## 2019-09-06 RX ORDER — CEFAZOLIN SODIUM 1 G
2000 VIAL (EA) INJECTION EVERY 8 HOURS
Refills: 0 | Status: DISCONTINUED | OUTPATIENT
Start: 2019-09-06 | End: 2019-09-06

## 2019-09-06 RX ORDER — NOREPINEPHRINE BITARTRATE/D5W 8 MG/250ML
0.05 PLASTIC BAG, INJECTION (ML) INTRAVENOUS
Qty: 8 | Refills: 0 | Status: DISCONTINUED | OUTPATIENT
Start: 2019-09-06 | End: 2019-09-07

## 2019-09-06 RX ORDER — FENTANYL CITRATE 50 UG/ML
25 INJECTION INTRAVENOUS ONCE
Refills: 0 | Status: DISCONTINUED | OUTPATIENT
Start: 2019-09-06 | End: 2019-09-07

## 2019-09-06 RX ORDER — VASOPRESSIN 20 [USP'U]/ML
0.03 INJECTION INTRAVENOUS
Qty: 50 | Refills: 0 | Status: DISCONTINUED | OUTPATIENT
Start: 2019-09-06 | End: 2019-09-07

## 2019-09-06 RX ORDER — NICARDIPINE HYDROCHLORIDE 30 MG/1
5 CAPSULE, EXTENDED RELEASE ORAL
Qty: 40 | Refills: 0 | Status: DISCONTINUED | OUTPATIENT
Start: 2019-09-06 | End: 2019-09-07

## 2019-09-06 RX ORDER — ALBUMIN HUMAN 25 %
250 VIAL (ML) INTRAVENOUS
Refills: 0 | Status: COMPLETED | OUTPATIENT
Start: 2019-09-06 | End: 2019-09-06

## 2019-09-06 RX ORDER — DEXTROSE 50 % IN WATER 50 %
25 SYRINGE (ML) INTRAVENOUS
Refills: 0 | Status: DISCONTINUED | OUTPATIENT
Start: 2019-09-06 | End: 2019-09-09

## 2019-09-06 RX ORDER — DEXMEDETOMIDINE HYDROCHLORIDE IN 0.9% SODIUM CHLORIDE 4 UG/ML
0.7 INJECTION INTRAVENOUS
Qty: 200 | Refills: 0 | Status: DISCONTINUED | OUTPATIENT
Start: 2019-09-06 | End: 2019-09-07

## 2019-09-06 RX ORDER — PROPOFOL 10 MG/ML
5 INJECTION, EMULSION INTRAVENOUS
Qty: 1000 | Refills: 0 | Status: DISCONTINUED | OUTPATIENT
Start: 2019-09-06 | End: 2019-09-07

## 2019-09-06 RX ADMIN — Medication 2000 MILLIGRAM(S): at 17:57

## 2019-09-06 RX ADMIN — FENTANYL CITRATE 50 MICROGRAM(S): 50 INJECTION INTRAVENOUS at 14:25

## 2019-09-06 RX ADMIN — Medication 25 MILLIGRAM(S): at 06:24

## 2019-09-06 RX ADMIN — Medication 10 MILLIGRAM(S): at 17:30

## 2019-09-06 RX ADMIN — Medication 500 MILLILITER(S): at 19:00

## 2019-09-06 RX ADMIN — FENTANYL CITRATE 50 MICROGRAM(S): 50 INJECTION INTRAVENOUS at 14:17

## 2019-09-06 RX ADMIN — FENTANYL CITRATE 50 MICROGRAM(S): 50 INJECTION INTRAVENOUS at 15:19

## 2019-09-06 RX ADMIN — CHLORHEXIDINE GLUCONATE 15 MILLILITER(S): 213 SOLUTION TOPICAL at 17:56

## 2019-09-06 RX ADMIN — CHLORHEXIDINE GLUCONATE 1 APPLICATION(S): 213 SOLUTION TOPICAL at 06:21

## 2019-09-06 RX ADMIN — SODIUM CHLORIDE 10 MILLILITER(S): 9 INJECTION INTRAMUSCULAR; INTRAVENOUS; SUBCUTANEOUS at 20:00

## 2019-09-06 RX ADMIN — FENTANYL CITRATE 50 MICROGRAM(S): 50 INJECTION INTRAVENOUS at 13:50

## 2019-09-06 RX ADMIN — Medication 500 MILLILITER(S): at 18:30

## 2019-09-06 RX ADMIN — FAMOTIDINE 20 MILLIGRAM(S): 10 INJECTION INTRAVENOUS at 17:57

## 2019-09-06 RX ADMIN — CHLORHEXIDINE GLUCONATE 30 MILLILITER(S): 213 SOLUTION TOPICAL at 06:23

## 2019-09-06 RX ADMIN — Medication 100 MILLIEQUIVALENT(S): at 23:09

## 2019-09-06 RX ADMIN — Medication 50 GRAM(S): at 14:26

## 2019-09-06 RX ADMIN — Medication 125 MILLILITER(S): at 23:35

## 2019-09-06 RX ADMIN — NICARDIPINE HYDROCHLORIDE 25 MG/HR: 30 CAPSULE, EXTENDED RELEASE ORAL at 20:00

## 2019-09-06 RX ADMIN — Medication 81 MILLIGRAM(S): at 23:47

## 2019-09-06 RX ADMIN — Medication 125 MILLILITER(S): at 23:00

## 2019-09-06 RX ADMIN — PROPOFOL 2.18 MICROGRAM(S)/KG/MIN: 10 INJECTION, EMULSION INTRAVENOUS at 15:00

## 2019-09-06 RX ADMIN — CHLORHEXIDINE GLUCONATE 1 APPLICATION(S): 213 SOLUTION TOPICAL at 19:59

## 2019-09-06 RX ADMIN — CLOPIDOGREL BISULFATE 75 MILLIGRAM(S): 75 TABLET, FILM COATED ORAL at 23:47

## 2019-09-06 RX ADMIN — Medication 1000 MILLIGRAM(S): at 20:00

## 2019-09-06 RX ADMIN — Medication 50 GRAM(S): at 23:09

## 2019-09-06 RX ADMIN — DEXMEDETOMIDINE HYDROCHLORIDE IN 0.9% SODIUM CHLORIDE 12.72 MICROGRAM(S)/KG/HR: 4 INJECTION INTRAVENOUS at 15:30

## 2019-09-06 RX ADMIN — Medication 100 MILLIEQUIVALENT(S): at 20:00

## 2019-09-06 RX ADMIN — Medication 400 MILLIGRAM(S): at 18:44

## 2019-09-06 NOTE — AIRWAY REMOVAL NOTE  ADULT & PEDS - RESPIRATORY RHYTHM/PATTERN
rate regular/pattern regular/unlabored/depth regular
unlabored/pattern regular/no shortness of breath/rate regular/depth regular

## 2019-09-06 NOTE — PROGRESS NOTE ADULT - SUBJECTIVE AND OBJECTIVE BOX
CTICU  CRITICAL  CARE  attending     Hand off received 					   Pertinent clinical, laboratory, radiographic, hemodynamic, echocardiographic, respiratory data, microbiologic data and chart were reviewed and analyzed frequently throughout the course of the day and night    Patient seen and examined with CTS/ SH attending at bedside  74 y/o M FORMER SMOKER/FORMER DRUG USE/FORMER ETOH USE with FHx of CAD (MI mother: age 80s) PMH of HTN, DM, asthma (denies any hospitalization/intubation);  who was referred to cardiologist Dr. Santos by PCP for abnormal ECG.  Pt. reports that 2 weeks ago; he was in the bus with his wife when he started to sweat profusely; reports of having mid-sternal; non-radiating chest tightness describes it as pulling; he thought he had indigestion and reports feeling better after having seltzer water. His wife was concerned; thus they went to see his PCP who did a EKG and referred the pt. to Dr. Santos; Pt. reports that was the only episode he had so far; denies any CP and SOB at baseline, dizziness, diaphoresis, palpitations, fatigue, LE edema, orthopnea, PND, syncope. He reports that he is very active and works 5 days/week. Subsequently; Pt had a ECHO 08/23/19 revealing EF 40-45 %; basal mid and distal inferior wall hypokinesis. EKG as per MD note revealed inferior infarction.    Cardiac catheterization showed triple vessel CAD.       HEALTH ISSUES - PROBLEM Dx:  Nutrition, metabolism, and development symptoms: Nutrition, metabolism, and development symptoms  Asthma: Asthma  Diabetes: Diabetes  HTN (hypertension): HTN (hypertension)  CAD (coronary artery disease): CAD (coronary artery disease)      FAMILY HISTORY:  FH: myocardial infarction: mother: age 80s  PAST MEDICAL & SURGICAL HISTORY:  S/P cataract surgery  H/O shoulder surgery: B/L  H/O total knee replacement, right    Patient is a 73y old  Male who presents with a chief complaint of   14 system review was unremarkable    Vital signs, hemodynamic and respiratory parameters were reviewed from the bedside nursing flow sheet.  ICU Vital Signs Last 24 Hrs  T(C): 36.6 (06 Sep 2019 22:23), Max: 36.9 (06 Sep 2019 01:20)  T(F): 97.8 (06 Sep 2019 22:23), Max: 98.4 (06 Sep 2019 01:20)  HR: 82 (06 Sep 2019 22:00) (70 - 100)  BP: 150/74 (06 Sep 2019 06:14) (150/74 - 154/99)  BP(mean): --  ABP: 92/46 (06 Sep 2019 22:00) (84/52 - 158/64)  ABP(mean): 62 (06 Sep 2019 22:00) (58 - 90)  RR: 14 (06 Sep 2019 22:00) (10 - 20)  SpO2: 98% (06 Sep 2019 22:00) (94% - 100%)    Adult Advanced Hemodynamics Last 24 Hrs  CVP(mm Hg): 2 (06 Sep 2019 22:00) (1 - 2)  CVP(cm H2O): --  CO: --  CI: --  PA: --  PA(mean): --  PCWP: --  SVR: --  SVRI: --  PVR: --  PVRI: --, ABG - ( 06 Sep 2019 22:02 )  pH, Arterial: 7.47  pH, Blood: x     /  pCO2: 35    /  pO2: 145   / HCO3: 25    / Base Excess: 1.5   /  SaO2: 99                Mode: CPAP with PS  RR (machine):   TV (machine):   FiO2: 100  PEEP: 5  ITime: 1  MAP: 8  PIP: 18    Intake and output was reviewed and the fluid balance was calculated  Daily     Daily   I&O's Summary    05 Sep 2019 07:01  -  06 Sep 2019 07:00  --------------------------------------------------------  IN: 360 mL / OUT: 500 mL / NET: -140 mL    06 Sep 2019 07:01  -  06 Sep 2019 23:00  --------------------------------------------------------  IN: 2148.6 mL / OUT: 2415 mL / NET: -266.4 mL        All lines and drain sites were assessed    Neuro: No change in the mental status from the baseline. Moves all 4 extremities.  Neck: No JVD.  CVS: S1, S1, No S3.  Lungs: Good air entry bilaterally.   Abd: Soft. No tenderness. + Bowel sounds.  Vascular: + DP/PT.  Extremities: No edema.  Lymphatic: Normal.  Skin: No abnormalities.      labs  CBC Full  -  ( 06 Sep 2019 22:03 )  WBC Count : 9.09 K/uL  RBC Count : 2.79 M/uL  Hemoglobin : 9.1 g/dL  Hematocrit : 27.0 %  Platelet Count - Automated : 138 K/uL  Mean Cell Volume : 96.8 fl  Mean Cell Hemoglobin : 32.6 pg  Mean Cell Hemoglobin Concentration : 33.7 gm/dL  Auto Neutrophil # : x  Auto Lymphocyte # : x  Auto Monocyte # : x  Auto Eosinophil # : x  Auto Basophil # : x  Auto Neutrophil % : x  Auto Lymphocyte % : x  Auto Monocyte % : x  Auto Eosinophil % : x  Auto Basophil % : x    09-06    137  |  104  |  13  ----------------------------<  113<H>  4.0   |  24  |  0.89    Ca    8.3<L>      06 Sep 2019 22:03  Phos  2.6     09-06  Mg     1.9     09-06    TPro  5.4<L>  /  Alb  2.8<L>  /  TBili  0.3  /  DBili  x   /  AST  28  /  ALT  11  /  AlkPhos  36<L>  09-06    PT/INR - ( 06 Sep 2019 22:03 )   PT: 16.3 sec;   INR: 1.43          PTT - ( 06 Sep 2019 22:03 )  PTT:26.5 sec  The current medications were reviewed   MEDICATIONS  (STANDING):  aspirin enteric coated 81 milliGRAM(s) Oral daily  ceFAZolin  Injectable. 2000 milliGRAM(s) IV Push every 8 hours  chlorhexidine 0.12% Liquid 15 milliLiter(s) Oral Mucosa every 12 hours  chlorhexidine 2% Cloths 1 Application(s) Topical <User Schedule>  clopidogrel Tablet 75 milliGRAM(s) Oral daily  dexmedetomidine Infusion 0.7 MICROgram(s)/kG/Hr (12.723 mL/Hr) IV Continuous <Continuous>  dextrose 50% Injectable 50 milliLiter(s) IV Push every 15 minutes  dextrose 50% Injectable 25 milliLiter(s) IV Push every 15 minutes  famotidine Injectable 20 milliGRAM(s) IV Push every 12 hours  heparin  Injectable 5000 Unit(s) SubCutaneous every 8 hours  insulin regular Infusion 2 Unit(s)/Hr (2 mL/Hr) IV Continuous <Continuous>  magnesium sulfate  IVPB 2 Gram(s) IV Intermittent once  niCARdipine Infusion 5 mG/Hr (25 mL/Hr) IV Continuous <Continuous>  potassium chloride  20 mEq/100 mL IVPB 20 milliEquivalent(s) IV Intermittent once  propofol Infusion 5 MICROgram(s)/kG/Min (2.181 mL/Hr) IV Continuous <Continuous>  sodium chloride 0.9%. 1000 milliLiter(s) (10 mL/Hr) IV Continuous <Continuous>  vasopressin Infusion 0.033 Unit(s)/Min (1.98 mL/Hr) IV Continuous <Continuous>    MEDICATIONS  (PRN):        PROBLEM LIST/ ASSESSMENT:  HEALTH ISSUES - PROBLEM Dx:  Nutrition, metabolism, and development symptoms: Nutrition, metabolism, and development symptoms  Asthma: Asthma  Diabetes: Diabetes  HTN (hypertension): HTN (hypertension)  CAD (coronary artery disease): CAD (coronary artery disease)      CAD  S/P CABG  Hemodynamically stable.  Good oxygenation.  Diuresing  well.      My plan includes :  D/C levophed.  Statin and Betablocker.  Dual antiplatelet Rx.  Close hemodynamic, ventilatory and drain monitoring and management  Monitor for arrhythmias and monitor parameters for organ perfusion  Monitor neurologic status  Monitor renal function.  D/C lines in AM.  Head of the bed should remain elevated to 45 deg .   Chest PT and IS will be encouraged  Monitor adequacy of oxygenation and ventilation and attempt to wean oxygen  Nutritional goals will be met using po eventually , ensure adequate caloric intake and monitor the same  Stress ulcer and VTE prophylaxis will be achieved    Glycemic control is satisfactory  Electrolytes have been repleted as necessary and wound care has been carried out. Pain control has been achieved.   Aggressive physical therapy and early mobility and ambulation goals will be met   The family was updated about the course and plan  CRITICAL CARE TIME SPENT in evaluation and management, reassessments, review and interpretation of labs and x-rays, ventilator and hemodynamic management, formulating a plan and coordinating care: ___30____ MIN.  Time does not include procedural time.  CTICU ATTENDING     					    Raymundo Green MD

## 2019-09-06 NOTE — BRIEF OPERATIVE NOTE - NSICDXBRIEFPROCEDURE_GEN_ALL_CORE_FT
PROCEDURES:  OPCAB (off-pump coronary artery bypass) 06-Sep-2019 11:53:50 LIMA-LAD, SVG-OM-PDA EF 55% Angie Zaidi

## 2019-09-06 NOTE — PROGRESS NOTE ADULT - SUBJECTIVE AND OBJECTIVE BOX
CTICU  CRITICAL  CARE  attending     Hand off received 					   Pertinent clinical, laboratory, radiographic, hemodynamic, echocardiographic, respiratory data, microbiologic data and chart were reviewed and analyzed frequently throughout the course of the day and night  Patient seen and examined with CTS/ SH attending at bedside  Pt is a 73y , Male, HEALTH ISSUES - PROBLEM Dx:  Nutrition, metabolism, and development symptoms: Nutrition, metabolism, and development symptoms  Asthma: Asthma  Diabetes: Diabetes  HTN (hypertension): HTN (hypertension)  CAD (coronary artery disease): CAD (coronary artery disease)      , FAMILY HISTORY:  FH: myocardial infarction: mother: age 80s  PAST MEDICAL & SURGICAL HISTORY:  S/P cataract surgery  H/O shoulder surgery: B/L  H/O total knee replacement, right    Patient is a 73y old  Male who presents with a chief complaint of     14 system review was unremarkable    Vital signs, hemodynamic and respiratory parameters were reviewed from the bedside nursing flowsheet.  ICU Vital Signs Last 24 Hrs  T(C): 36.6 (06 Sep 2019 22:23), Max: 36.9 (06 Sep 2019 01:20)  T(F): 97.8 (06 Sep 2019 22:23), Max: 98.4 (06 Sep 2019 01:20)  HR: 82 (06 Sep 2019 22:00) (70 - 100)  BP: 150/74 (06 Sep 2019 06:14) (150/74 - 154/99)  BP(mean): --  ABP: 92/46 (06 Sep 2019 22:00) (84/52 - 158/64)  ABP(mean): 62 (06 Sep 2019 22:00) (58 - 90)  RR: 14 (06 Sep 2019 22:00) (10 - 20)  SpO2: 98% (06 Sep 2019 22:00) (94% - 100%)    Adult Advanced Hemodynamics Last 24 Hrs  CVP(mm Hg): 2 (06 Sep 2019 22:00) (1 - 2)  CVP(cm H2O): --  CO: --  CI: --  PA: --  PA(mean): --  PCWP: --  SVR: --  SVRI: --  PVR: --  PVRI: --, ABG - ( 06 Sep 2019 22:02 )  pH, Arterial: 7.47  pH, Blood: x     /  pCO2: 35    /  pO2: 145   / HCO3: 25    / Base Excess: 1.5   /  SaO2: 99                Mode: CPAP with PS  RR (machine):   TV (machine):   FiO2: 100  PEEP: 5  ITime: 1  MAP: 8  PIP: 18    Intake and output was reviewed and the fluid balance was calculated  Daily     Daily   I&O's Summary    05 Sep 2019 07:01  -  06 Sep 2019 07:00  --------------------------------------------------------  IN: 360 mL / OUT: 500 mL / NET: -140 mL    06 Sep 2019 07:01  -  06 Sep 2019 22:56  --------------------------------------------------------  IN: 2148.6 mL / OUT: 2415 mL / NET: -266.4 mL        All lines and drain sites were assessed  [facility]      POCT Blood Glucose.: 107 mg/dL (06 Sep 2019 21:57)    No acute change in mental status  Auscultation of the chest reveals equal bs  Abdomen is soft  Extremities are warm and well perfused  Wounds appear clean and unremarkable  Antibiotics are periop    labs  CBC Full  -  ( 06 Sep 2019 22:03 )  WBC Count : 9.09 K/uL  RBC Count : 2.79 M/uL  Hemoglobin : 9.1 g/dL  Hematocrit : 27.0 %  Platelet Count - Automated : 138 K/uL  Mean Cell Volume : 96.8 fl  Mean Cell Hemoglobin : 32.6 pg  Mean Cell Hemoglobin Concentration : 33.7 gm/dL  Auto Neutrophil # : x  Auto Lymphocyte # : x  Auto Monocyte # : x  Auto Eosinophil # : x  Auto Basophil # : x  Auto Neutrophil % : x  Auto Lymphocyte % : x  Auto Monocyte % : x  Auto Eosinophil % : x  Auto Basophil % : x    09-06    137  |  104  |  13  ----------------------------<  113<H>  4.0   |  24  |  0.89    Ca    8.3<L>      06 Sep 2019 22:03  Phos  2.6     09-06  Mg     1.9     09-06    TPro  5.4<L>  /  Alb  2.8<L>  /  TBili  0.3  /  DBili  x   /  AST  28  /  ALT  11  /  AlkPhos  36<L>  09-06    PT/INR - ( 06 Sep 2019 22:03 )   PT: 16.3 sec;   INR: 1.43          PTT - ( 06 Sep 2019 22:03 )  PTT:26.5 sec  The current medications were reviewed   MEDICATIONS  (STANDING):  aspirin enteric coated 81 milliGRAM(s) Oral daily  ceFAZolin  Injectable. 2000 milliGRAM(s) IV Push every 8 hours  chlorhexidine 0.12% Liquid 15 milliLiter(s) Oral Mucosa every 12 hours  chlorhexidine 2% Cloths 1 Application(s) Topical <User Schedule>  clopidogrel Tablet 75 milliGRAM(s) Oral daily  dexmedetomidine Infusion 0.7 MICROgram(s)/kG/Hr (12.723 mL/Hr) IV Continuous <Continuous>  dextrose 50% Injectable 50 milliLiter(s) IV Push every 15 minutes  dextrose 50% Injectable 25 milliLiter(s) IV Push every 15 minutes  famotidine Injectable 20 milliGRAM(s) IV Push every 12 hours  heparin  Injectable 5000 Unit(s) SubCutaneous every 8 hours  insulin regular Infusion 2 Unit(s)/Hr (2 mL/Hr) IV Continuous <Continuous>  magnesium sulfate  IVPB 2 Gram(s) IV Intermittent once  niCARdipine Infusion 5 mG/Hr (25 mL/Hr) IV Continuous <Continuous>  potassium chloride  20 mEq/100 mL IVPB 20 milliEquivalent(s) IV Intermittent once  propofol Infusion 5 MICROgram(s)/kG/Min (2.181 mL/Hr) IV Continuous <Continuous>  sodium chloride 0.9%. 1000 milliLiter(s) (10 mL/Hr) IV Continuous <Continuous>  vasopressin Infusion 0.033 Unit(s)/Min (1.98 mL/Hr) IV Continuous <Continuous>    MEDICATIONS  (PRN):       PROBLEM LIST/ ASSESSMENT:  HEALTH ISSUES - PROBLEM Dx:  Nutrition, metabolism, and development symptoms: Nutrition, metabolism, and development symptoms  Asthma: Asthma  Diabetes: Diabetes  HTN (hypertension): HTN (hypertension)  CAD (coronary artery disease): CAD (coronary artery disease)      ,   Patient is a 73y old  Male who presents with a chief complaint of    s/p cardiac surgery          My plan includes :  close hemodynamic, ventilatory and drain monitoring and management per post op routine    Monitor for arrhythmias and monitor parameters for organ perfusion  beta blockade not administered due to hemodynamic instability and bradycardia  monitor neurologic status  Head of the bed should remain elevated to 45 deg .   chest PT and IS will be encouraged  monitor adequacy of oxygenation and ventilation and attempt to wean oxygen  antibiotic regimen will be tailored to the clinical, laboratory and microbiologic data  Nutritional goals will be met using po eventually , ensure adequate caloric intake and montior the same  Stress ulcer and VTE prophylaxis will be achieved    Glycemic control is satisfactory  Electrolytes have been repleted as necessary and wound care has been carried out. Pain control has been achieved.   agressive physical therapy and early mobility and ambulation goals will be met   The family was updated about the course and plan  CRITICAL CARE TIME personally provided by me  in evaluation and management, reassessments, review and interpretation of labs and x-rays, ventilator and hemodynamic management, formulating a plan and coordinating care: ___90____ MIN.  Time does not include procedural time.  CTICU ATTENDING     					    Junaid Killian MD

## 2019-09-07 LAB
ALBUMIN SERPL ELPH-MCNC: 3.1 G/DL — LOW (ref 3.3–5)
ALBUMIN SERPL ELPH-MCNC: 3.3 G/DL — SIGNIFICANT CHANGE UP (ref 3.3–5)
ALBUMIN SERPL ELPH-MCNC: 3.4 G/DL — SIGNIFICANT CHANGE UP (ref 3.3–5)
ALBUMIN SERPL ELPH-MCNC: 3.6 G/DL — SIGNIFICANT CHANGE UP (ref 3.3–5)
ALP SERPL-CCNC: 30 U/L — LOW (ref 40–120)
ALP SERPL-CCNC: 35 U/L — LOW (ref 40–120)
ALP SERPL-CCNC: 38 U/L — LOW (ref 40–120)
ALP SERPL-CCNC: 43 U/L — SIGNIFICANT CHANGE UP (ref 40–120)
ALT FLD-CCNC: 10 U/L — SIGNIFICANT CHANGE UP (ref 10–45)
ALT FLD-CCNC: 9 U/L — LOW (ref 10–45)
ANION GAP SERPL CALC-SCNC: 10 MMOL/L — SIGNIFICANT CHANGE UP (ref 5–17)
ANION GAP SERPL CALC-SCNC: 10 MMOL/L — SIGNIFICANT CHANGE UP (ref 5–17)
ANION GAP SERPL CALC-SCNC: 11 MMOL/L — SIGNIFICANT CHANGE UP (ref 5–17)
ANION GAP SERPL CALC-SCNC: 8 MMOL/L — SIGNIFICANT CHANGE UP (ref 5–17)
APTT BLD: 28.9 SEC — SIGNIFICANT CHANGE UP (ref 27.5–36.3)
APTT BLD: 30.8 SEC — SIGNIFICANT CHANGE UP (ref 27.5–36.3)
APTT BLD: 31.9 SEC — SIGNIFICANT CHANGE UP (ref 27.5–36.3)
APTT BLD: 35 SEC — SIGNIFICANT CHANGE UP (ref 27.5–36.3)
AST SERPL-CCNC: 24 U/L — SIGNIFICANT CHANGE UP (ref 10–40)
AST SERPL-CCNC: 28 U/L — SIGNIFICANT CHANGE UP (ref 10–40)
AST SERPL-CCNC: 29 U/L — SIGNIFICANT CHANGE UP (ref 10–40)
AST SERPL-CCNC: 32 U/L — SIGNIFICANT CHANGE UP (ref 10–40)
BILIRUB SERPL-MCNC: 0.2 MG/DL — SIGNIFICANT CHANGE UP (ref 0.2–1.2)
BILIRUB SERPL-MCNC: 0.3 MG/DL — SIGNIFICANT CHANGE UP (ref 0.2–1.2)
BILIRUB SERPL-MCNC: 0.4 MG/DL — SIGNIFICANT CHANGE UP (ref 0.2–1.2)
BILIRUB SERPL-MCNC: 0.5 MG/DL — SIGNIFICANT CHANGE UP (ref 0.2–1.2)
BUN SERPL-MCNC: 11 MG/DL — SIGNIFICANT CHANGE UP (ref 7–23)
BUN SERPL-MCNC: 12 MG/DL — SIGNIFICANT CHANGE UP (ref 7–23)
BUN SERPL-MCNC: 12 MG/DL — SIGNIFICANT CHANGE UP (ref 7–23)
BUN SERPL-MCNC: 13 MG/DL — SIGNIFICANT CHANGE UP (ref 7–23)
CALCIUM SERPL-MCNC: 7.9 MG/DL — LOW (ref 8.4–10.5)
CALCIUM SERPL-MCNC: 8 MG/DL — LOW (ref 8.4–10.5)
CALCIUM SERPL-MCNC: 8.2 MG/DL — LOW (ref 8.4–10.5)
CALCIUM SERPL-MCNC: 8.4 MG/DL — SIGNIFICANT CHANGE UP (ref 8.4–10.5)
CHLORIDE SERPL-SCNC: 103 MMOL/L — SIGNIFICANT CHANGE UP (ref 96–108)
CHLORIDE SERPL-SCNC: 103 MMOL/L — SIGNIFICANT CHANGE UP (ref 96–108)
CHLORIDE SERPL-SCNC: 104 MMOL/L — SIGNIFICANT CHANGE UP (ref 96–108)
CHLORIDE SERPL-SCNC: 104 MMOL/L — SIGNIFICANT CHANGE UP (ref 96–108)
CO2 SERPL-SCNC: 21 MMOL/L — LOW (ref 22–31)
CO2 SERPL-SCNC: 22 MMOL/L — SIGNIFICANT CHANGE UP (ref 22–31)
CO2 SERPL-SCNC: 23 MMOL/L — SIGNIFICANT CHANGE UP (ref 22–31)
CO2 SERPL-SCNC: 24 MMOL/L — SIGNIFICANT CHANGE UP (ref 22–31)
CREAT SERPL-MCNC: 0.86 MG/DL — SIGNIFICANT CHANGE UP (ref 0.5–1.3)
CREAT SERPL-MCNC: 0.9 MG/DL — SIGNIFICANT CHANGE UP (ref 0.5–1.3)
CREAT SERPL-MCNC: 0.94 MG/DL — SIGNIFICANT CHANGE UP (ref 0.5–1.3)
CREAT SERPL-MCNC: 0.97 MG/DL — SIGNIFICANT CHANGE UP (ref 0.5–1.3)
GAS PNL BLDA: SIGNIFICANT CHANGE UP
GLUCOSE BLDC GLUCOMTR-MCNC: 106 MG/DL — HIGH (ref 70–99)
GLUCOSE BLDC GLUCOMTR-MCNC: 117 MG/DL — HIGH (ref 70–99)
GLUCOSE BLDC GLUCOMTR-MCNC: 193 MG/DL — HIGH (ref 70–99)
GLUCOSE BLDC GLUCOMTR-MCNC: 268 MG/DL — HIGH (ref 70–99)
GLUCOSE BLDC GLUCOMTR-MCNC: 290 MG/DL — HIGH (ref 70–99)
GLUCOSE BLDC GLUCOMTR-MCNC: 92 MG/DL — SIGNIFICANT CHANGE UP (ref 70–99)
GLUCOSE BLDC GLUCOMTR-MCNC: 97 MG/DL — SIGNIFICANT CHANGE UP (ref 70–99)
GLUCOSE BLDC GLUCOMTR-MCNC: 99 MG/DL — SIGNIFICANT CHANGE UP (ref 70–99)
GLUCOSE SERPL-MCNC: 127 MG/DL — HIGH (ref 70–99)
GLUCOSE SERPL-MCNC: 169 MG/DL — HIGH (ref 70–99)
GLUCOSE SERPL-MCNC: 325 MG/DL — HIGH (ref 70–99)
GLUCOSE SERPL-MCNC: 97 MG/DL — SIGNIFICANT CHANGE UP (ref 70–99)
HCT VFR BLD CALC: 23.7 % — LOW (ref 39–50)
HCT VFR BLD CALC: 24.6 % — LOW (ref 39–50)
HCT VFR BLD CALC: 26.7 % — LOW (ref 39–50)
HCT VFR BLD CALC: 27.4 % — LOW (ref 39–50)
HGB BLD-MCNC: 8 G/DL — LOW (ref 13–17)
HGB BLD-MCNC: 8.2 G/DL — LOW (ref 13–17)
HGB BLD-MCNC: 8.9 G/DL — LOW (ref 13–17)
HGB BLD-MCNC: 9 G/DL — LOW (ref 13–17)
INR BLD: 1.45 — HIGH (ref 0.88–1.16)
INR BLD: 1.52 — HIGH (ref 0.88–1.16)
INR BLD: 1.53 — HIGH (ref 0.88–1.16)
INR BLD: 1.56 — HIGH (ref 0.88–1.16)
LACTATE SERPL-SCNC: 0.8 MMOL/L — SIGNIFICANT CHANGE UP (ref 0.5–2)
LACTATE SERPL-SCNC: 0.9 MMOL/L — SIGNIFICANT CHANGE UP (ref 0.5–2)
LACTATE SERPL-SCNC: 1.5 MMOL/L — SIGNIFICANT CHANGE UP (ref 0.5–2)
LACTATE SERPL-SCNC: 1.8 MMOL/L — SIGNIFICANT CHANGE UP (ref 0.5–2)
MAGNESIUM SERPL-MCNC: 1.9 MG/DL — SIGNIFICANT CHANGE UP (ref 1.6–2.6)
MAGNESIUM SERPL-MCNC: 2.1 MG/DL — SIGNIFICANT CHANGE UP (ref 1.6–2.6)
MAGNESIUM SERPL-MCNC: 2.2 MG/DL — SIGNIFICANT CHANGE UP (ref 1.6–2.6)
MAGNESIUM SERPL-MCNC: 2.3 MG/DL — SIGNIFICANT CHANGE UP (ref 1.6–2.6)
MCHC RBC-ENTMCNC: 32.3 PG — SIGNIFICANT CHANGE UP (ref 27–34)
MCHC RBC-ENTMCNC: 32.3 PG — SIGNIFICANT CHANGE UP (ref 27–34)
MCHC RBC-ENTMCNC: 32.7 PG — SIGNIFICANT CHANGE UP (ref 27–34)
MCHC RBC-ENTMCNC: 32.8 GM/DL — SIGNIFICANT CHANGE UP (ref 32–36)
MCHC RBC-ENTMCNC: 33.3 GM/DL — SIGNIFICANT CHANGE UP (ref 32–36)
MCHC RBC-ENTMCNC: 33.3 GM/DL — SIGNIFICANT CHANGE UP (ref 32–36)
MCHC RBC-ENTMCNC: 33.3 PG — SIGNIFICANT CHANGE UP (ref 27–34)
MCHC RBC-ENTMCNC: 33.8 GM/DL — SIGNIFICANT CHANGE UP (ref 32–36)
MCV RBC AUTO: 96.9 FL — SIGNIFICANT CHANGE UP (ref 80–100)
MCV RBC AUTO: 98.2 FL — SIGNIFICANT CHANGE UP (ref 80–100)
MCV RBC AUTO: 98.2 FL — SIGNIFICANT CHANGE UP (ref 80–100)
MCV RBC AUTO: 98.8 FL — SIGNIFICANT CHANGE UP (ref 80–100)
NRBC # BLD: 0 /100 WBCS — SIGNIFICANT CHANGE UP (ref 0–0)
PHOSPHATE SERPL-MCNC: 1.4 MG/DL — LOW (ref 2.5–4.5)
PHOSPHATE SERPL-MCNC: 1.9 MG/DL — LOW (ref 2.5–4.5)
PHOSPHATE SERPL-MCNC: 3 MG/DL — SIGNIFICANT CHANGE UP (ref 2.5–4.5)
PLATELET # BLD AUTO: 123 K/UL — LOW (ref 150–400)
PLATELET # BLD AUTO: 125 K/UL — LOW (ref 150–400)
PLATELET # BLD AUTO: 127 K/UL — LOW (ref 150–400)
PLATELET # BLD AUTO: 136 K/UL — LOW (ref 150–400)
POTASSIUM SERPL-MCNC: 4 MMOL/L — SIGNIFICANT CHANGE UP (ref 3.5–5.3)
POTASSIUM SERPL-MCNC: 4.1 MMOL/L — SIGNIFICANT CHANGE UP (ref 3.5–5.3)
POTASSIUM SERPL-MCNC: 4.1 MMOL/L — SIGNIFICANT CHANGE UP (ref 3.5–5.3)
POTASSIUM SERPL-MCNC: 4.3 MMOL/L — SIGNIFICANT CHANGE UP (ref 3.5–5.3)
POTASSIUM SERPL-SCNC: 4 MMOL/L — SIGNIFICANT CHANGE UP (ref 3.5–5.3)
POTASSIUM SERPL-SCNC: 4.1 MMOL/L — SIGNIFICANT CHANGE UP (ref 3.5–5.3)
POTASSIUM SERPL-SCNC: 4.1 MMOL/L — SIGNIFICANT CHANGE UP (ref 3.5–5.3)
POTASSIUM SERPL-SCNC: 4.3 MMOL/L — SIGNIFICANT CHANGE UP (ref 3.5–5.3)
PROT SERPL-MCNC: 5.4 G/DL — LOW (ref 6–8.3)
PROT SERPL-MCNC: 5.9 G/DL — LOW (ref 6–8.3)
PROT SERPL-MCNC: 5.9 G/DL — LOW (ref 6–8.3)
PROT SERPL-MCNC: 6 G/DL — SIGNIFICANT CHANGE UP (ref 6–8.3)
PROTHROM AB SERPL-ACNC: 16.6 SEC — HIGH (ref 10–12.9)
PROTHROM AB SERPL-ACNC: 17.4 SEC — HIGH (ref 10–12.9)
PROTHROM AB SERPL-ACNC: 17.5 SEC — HIGH (ref 10–12.9)
PROTHROM AB SERPL-ACNC: 17.9 SEC — HIGH (ref 10–12.9)
RBC # BLD: 2.4 M/UL — LOW (ref 4.2–5.8)
RBC # BLD: 2.54 M/UL — LOW (ref 4.2–5.8)
RBC # BLD: 2.72 M/UL — LOW (ref 4.2–5.8)
RBC # BLD: 2.79 M/UL — LOW (ref 4.2–5.8)
RBC # FLD: 12.9 % — SIGNIFICANT CHANGE UP (ref 10.3–14.5)
RBC # FLD: 13 % — SIGNIFICANT CHANGE UP (ref 10.3–14.5)
RBC # FLD: 13 % — SIGNIFICANT CHANGE UP (ref 10.3–14.5)
RBC # FLD: 13.1 % — SIGNIFICANT CHANGE UP (ref 10.3–14.5)
SODIUM SERPL-SCNC: 135 MMOL/L — SIGNIFICANT CHANGE UP (ref 135–145)
SODIUM SERPL-SCNC: 136 MMOL/L — SIGNIFICANT CHANGE UP (ref 135–145)
TSH SERPL-MCNC: 0.78 UIU/ML — SIGNIFICANT CHANGE UP (ref 0.35–4.94)
WBC # BLD: 8.24 K/UL — SIGNIFICANT CHANGE UP (ref 3.8–10.5)
WBC # BLD: 8.74 K/UL — SIGNIFICANT CHANGE UP (ref 3.8–10.5)
WBC # BLD: 9.12 K/UL — SIGNIFICANT CHANGE UP (ref 3.8–10.5)
WBC # BLD: 9.76 K/UL — SIGNIFICANT CHANGE UP (ref 3.8–10.5)
WBC # FLD AUTO: 8.24 K/UL — SIGNIFICANT CHANGE UP (ref 3.8–10.5)
WBC # FLD AUTO: 8.74 K/UL — SIGNIFICANT CHANGE UP (ref 3.8–10.5)
WBC # FLD AUTO: 9.12 K/UL — SIGNIFICANT CHANGE UP (ref 3.8–10.5)
WBC # FLD AUTO: 9.76 K/UL — SIGNIFICANT CHANGE UP (ref 3.8–10.5)

## 2019-09-07 PROCEDURE — 99291 CRITICAL CARE FIRST HOUR: CPT

## 2019-09-07 PROCEDURE — 71045 X-RAY EXAM CHEST 1 VIEW: CPT | Mod: 26

## 2019-09-07 RX ORDER — OXYCODONE AND ACETAMINOPHEN 5; 325 MG/1; MG/1
1 TABLET ORAL EVERY 6 HOURS
Refills: 0 | Status: DISCONTINUED | OUTPATIENT
Start: 2019-09-07 | End: 2019-09-08

## 2019-09-07 RX ORDER — HUMAN INSULIN 100 [IU]/ML
7 INJECTION, SUSPENSION SUBCUTANEOUS ONCE
Refills: 0 | Status: COMPLETED | OUTPATIENT
Start: 2019-09-07 | End: 2019-09-07

## 2019-09-07 RX ORDER — INSULIN GLARGINE 100 [IU]/ML
20 INJECTION, SOLUTION SUBCUTANEOUS AT BEDTIME
Refills: 0 | Status: DISCONTINUED | OUTPATIENT
Start: 2019-09-07 | End: 2019-09-09

## 2019-09-07 RX ORDER — POTASSIUM PHOSPHATE, MONOBASIC POTASSIUM PHOSPHATE, DIBASIC 236; 224 MG/ML; MG/ML
30 INJECTION, SOLUTION INTRAVENOUS ONCE
Refills: 0 | Status: COMPLETED | OUTPATIENT
Start: 2019-09-07 | End: 2019-09-07

## 2019-09-07 RX ORDER — KETOROLAC TROMETHAMINE 30 MG/ML
15 SYRINGE (ML) INJECTION ONCE
Refills: 0 | Status: DISCONTINUED | OUTPATIENT
Start: 2019-09-07 | End: 2019-09-07

## 2019-09-07 RX ORDER — SODIUM CHLORIDE 9 MG/ML
1000 INJECTION, SOLUTION INTRAVENOUS
Refills: 0 | Status: DISCONTINUED | OUTPATIENT
Start: 2019-09-07 | End: 2019-09-11

## 2019-09-07 RX ORDER — DOCUSATE SODIUM 100 MG
100 CAPSULE ORAL THREE TIMES A DAY
Refills: 0 | Status: DISCONTINUED | OUTPATIENT
Start: 2019-09-07 | End: 2019-09-11

## 2019-09-07 RX ORDER — INSULIN LISPRO 100/ML
7 VIAL (ML) SUBCUTANEOUS
Refills: 0 | Status: DISCONTINUED | OUTPATIENT
Start: 2019-09-07 | End: 2019-09-11

## 2019-09-07 RX ORDER — OXYCODONE AND ACETAMINOPHEN 5; 325 MG/1; MG/1
2 TABLET ORAL EVERY 6 HOURS
Refills: 0 | Status: DISCONTINUED | OUTPATIENT
Start: 2019-09-07 | End: 2019-09-08

## 2019-09-07 RX ORDER — SENNA PLUS 8.6 MG/1
2 TABLET ORAL AT BEDTIME
Refills: 0 | Status: DISCONTINUED | OUTPATIENT
Start: 2019-09-07 | End: 2019-09-11

## 2019-09-07 RX ORDER — ALBUMIN HUMAN 25 %
250 VIAL (ML) INTRAVENOUS ONCE
Refills: 0 | Status: COMPLETED | OUTPATIENT
Start: 2019-09-07 | End: 2019-09-07

## 2019-09-07 RX ORDER — ALBUTEROL 90 UG/1
2 AEROSOL, METERED ORAL EVERY 6 HOURS
Refills: 0 | Status: DISCONTINUED | OUTPATIENT
Start: 2019-09-07 | End: 2019-09-11

## 2019-09-07 RX ORDER — POTASSIUM CHLORIDE 20 MEQ
20 PACKET (EA) ORAL ONCE
Refills: 0 | Status: COMPLETED | OUTPATIENT
Start: 2019-09-07 | End: 2019-09-07

## 2019-09-07 RX ORDER — GLUCAGON INJECTION, SOLUTION 0.5 MG/.1ML
1 INJECTION, SOLUTION SUBCUTANEOUS ONCE
Refills: 0 | Status: DISCONTINUED | OUTPATIENT
Start: 2019-09-07 | End: 2019-09-11

## 2019-09-07 RX ORDER — INSULIN LISPRO 100/ML
5 VIAL (ML) SUBCUTANEOUS
Refills: 0 | Status: DISCONTINUED | OUTPATIENT
Start: 2019-09-07 | End: 2019-09-07

## 2019-09-07 RX ORDER — INSULIN GLARGINE 100 [IU]/ML
15 INJECTION, SOLUTION SUBCUTANEOUS AT BEDTIME
Refills: 0 | Status: DISCONTINUED | OUTPATIENT
Start: 2019-09-07 | End: 2019-09-07

## 2019-09-07 RX ORDER — DEXTROSE 50 % IN WATER 50 %
15 SYRINGE (ML) INTRAVENOUS ONCE
Refills: 0 | Status: DISCONTINUED | OUTPATIENT
Start: 2019-09-07 | End: 2019-09-11

## 2019-09-07 RX ORDER — METOPROLOL TARTRATE 50 MG
12.5 TABLET ORAL EVERY 12 HOURS
Refills: 0 | Status: DISCONTINUED | OUTPATIENT
Start: 2019-09-07 | End: 2019-09-07

## 2019-09-07 RX ORDER — FUROSEMIDE 40 MG
20 TABLET ORAL ONCE
Refills: 0 | Status: COMPLETED | OUTPATIENT
Start: 2019-09-07 | End: 2019-09-07

## 2019-09-07 RX ORDER — CALCIUM GLUCONATE 100 MG/ML
1 VIAL (ML) INTRAVENOUS ONCE
Refills: 0 | Status: COMPLETED | OUTPATIENT
Start: 2019-09-07 | End: 2019-09-07

## 2019-09-07 RX ORDER — PANTOPRAZOLE SODIUM 20 MG/1
40 TABLET, DELAYED RELEASE ORAL
Refills: 0 | Status: DISCONTINUED | OUTPATIENT
Start: 2019-09-07 | End: 2019-09-11

## 2019-09-07 RX ORDER — ACETAMINOPHEN 500 MG
1000 TABLET ORAL ONCE
Refills: 0 | Status: COMPLETED | OUTPATIENT
Start: 2019-09-07 | End: 2019-09-07

## 2019-09-07 RX ORDER — TRAMADOL HYDROCHLORIDE 50 MG/1
25 TABLET ORAL EVERY 6 HOURS
Refills: 0 | Status: DISCONTINUED | OUTPATIENT
Start: 2019-09-07 | End: 2019-09-07

## 2019-09-07 RX ORDER — ACETAMINOPHEN 500 MG
650 TABLET ORAL EVERY 6 HOURS
Refills: 0 | Status: DISCONTINUED | OUTPATIENT
Start: 2019-09-07 | End: 2019-09-11

## 2019-09-07 RX ORDER — FLUTICASONE PROPIONATE 50 MCG
1 SPRAY, SUSPENSION NASAL
Refills: 0 | Status: DISCONTINUED | OUTPATIENT
Start: 2019-09-07 | End: 2019-09-11

## 2019-09-07 RX ORDER — INSULIN LISPRO 100/ML
VIAL (ML) SUBCUTANEOUS
Refills: 0 | Status: DISCONTINUED | OUTPATIENT
Start: 2019-09-07 | End: 2019-09-11

## 2019-09-07 RX ORDER — METOPROLOL TARTRATE 50 MG
12.5 TABLET ORAL EVERY 6 HOURS
Refills: 0 | Status: DISCONTINUED | OUTPATIENT
Start: 2019-09-07 | End: 2019-09-08

## 2019-09-07 RX ORDER — ATORVASTATIN CALCIUM 80 MG/1
80 TABLET, FILM COATED ORAL AT BEDTIME
Refills: 0 | Status: DISCONTINUED | OUTPATIENT
Start: 2019-09-07 | End: 2019-09-11

## 2019-09-07 RX ADMIN — Medication 2000 MILLIGRAM(S): at 14:20

## 2019-09-07 RX ADMIN — CHLORHEXIDINE GLUCONATE 1 APPLICATION(S): 213 SOLUTION TOPICAL at 05:39

## 2019-09-07 RX ADMIN — Medication 81 MILLIGRAM(S): at 11:09

## 2019-09-07 RX ADMIN — TRAMADOL HYDROCHLORIDE 25 MILLIGRAM(S): 50 TABLET ORAL at 21:40

## 2019-09-07 RX ADMIN — Medication 2000 MILLIGRAM(S): at 21:44

## 2019-09-07 RX ADMIN — Medication 100 MILLIEQUIVALENT(S): at 05:39

## 2019-09-07 RX ADMIN — Medication 12.5 MILLIGRAM(S): at 11:09

## 2019-09-07 RX ADMIN — Medication 12.5 MILLIGRAM(S): at 17:38

## 2019-09-07 RX ADMIN — TRAMADOL HYDROCHLORIDE 25 MILLIGRAM(S): 50 TABLET ORAL at 20:29

## 2019-09-07 RX ADMIN — Medication 400 MILLIGRAM(S): at 06:00

## 2019-09-07 RX ADMIN — Medication 100 MILLIEQUIVALENT(S): at 23:35

## 2019-09-07 RX ADMIN — Medication 200 GRAM(S): at 23:20

## 2019-09-07 RX ADMIN — HEPARIN SODIUM 5000 UNIT(S): 5000 INJECTION INTRAVENOUS; SUBCUTANEOUS at 11:09

## 2019-09-07 RX ADMIN — SENNA PLUS 2 TABLET(S): 8.6 TABLET ORAL at 21:08

## 2019-09-07 RX ADMIN — Medication 1000 MILLIGRAM(S): at 06:42

## 2019-09-07 RX ADMIN — INSULIN GLARGINE 20 UNIT(S): 100 INJECTION, SOLUTION SUBCUTANEOUS at 21:44

## 2019-09-07 RX ADMIN — Medication 125 MILLILITER(S): at 22:05

## 2019-09-07 RX ADMIN — Medication 5 UNIT(S): at 17:38

## 2019-09-07 RX ADMIN — HUMAN INSULIN 7 UNIT(S): 100 INJECTION, SUSPENSION SUBCUTANEOUS at 07:45

## 2019-09-07 RX ADMIN — HEPARIN SODIUM 5000 UNIT(S): 5000 INJECTION INTRAVENOUS; SUBCUTANEOUS at 02:30

## 2019-09-07 RX ADMIN — HEPARIN SODIUM 5000 UNIT(S): 5000 INJECTION INTRAVENOUS; SUBCUTANEOUS at 17:39

## 2019-09-07 RX ADMIN — Medication 2000 MILLIGRAM(S): at 02:30

## 2019-09-07 RX ADMIN — Medication 5 UNIT(S): at 07:52

## 2019-09-07 RX ADMIN — POTASSIUM PHOSPHATE, MONOBASIC POTASSIUM PHOSPHATE, DIBASIC 83.33 MILLIMOLE(S): 236; 224 INJECTION, SOLUTION INTRAVENOUS at 16:53

## 2019-09-07 RX ADMIN — Medication 5 UNIT(S): at 12:16

## 2019-09-07 RX ADMIN — Medication 6: at 17:39

## 2019-09-07 RX ADMIN — Medication 15 MILLIGRAM(S): at 21:40

## 2019-09-07 RX ADMIN — FAMOTIDINE 20 MILLIGRAM(S): 10 INJECTION INTRAVENOUS at 05:40

## 2019-09-07 RX ADMIN — TRAMADOL HYDROCHLORIDE 25 MILLIGRAM(S): 50 TABLET ORAL at 15:14

## 2019-09-07 RX ADMIN — Medication 100 MILLIGRAM(S): at 14:19

## 2019-09-07 RX ADMIN — FENTANYL CITRATE 25 MICROGRAM(S): 50 INJECTION INTRAVENOUS at 00:31

## 2019-09-07 RX ADMIN — CHLORHEXIDINE GLUCONATE 15 MILLILITER(S): 213 SOLUTION TOPICAL at 05:39

## 2019-09-07 RX ADMIN — ATORVASTATIN CALCIUM 80 MILLIGRAM(S): 80 TABLET, FILM COATED ORAL at 21:08

## 2019-09-07 RX ADMIN — TRAMADOL HYDROCHLORIDE 25 MILLIGRAM(S): 50 TABLET ORAL at 14:19

## 2019-09-07 RX ADMIN — Medication 125 MILLILITER(S): at 23:20

## 2019-09-07 RX ADMIN — VASOPRESSIN 1.98 UNIT(S)/MIN: 20 INJECTION INTRAVENOUS at 00:15

## 2019-09-07 RX ADMIN — Medication 20 MILLIGRAM(S): at 17:38

## 2019-09-07 RX ADMIN — CLOPIDOGREL BISULFATE 75 MILLIGRAM(S): 75 TABLET, FILM COATED ORAL at 11:09

## 2019-09-07 RX ADMIN — Medication 15 MILLIGRAM(S): at 21:07

## 2019-09-07 RX ADMIN — FENTANYL CITRATE 25 MICROGRAM(S): 50 INJECTION INTRAVENOUS at 00:38

## 2019-09-07 RX ADMIN — Medication 2: at 21:43

## 2019-09-07 RX ADMIN — Medication 100 MILLIGRAM(S): at 21:08

## 2019-09-07 NOTE — PHYSICAL THERAPY INITIAL EVALUATION ADULT - ADDITIONAL COMMENTS
Pt lives in an elevator access apt with 5 steps to enter from outside with his wife. At baseline, ambulates independently with no DME. Reports he is very active at baseline and likes to play basketball. Wife is also retired and can be home to assist as needed.

## 2019-09-07 NOTE — CONSULT NOTE ADULT - SUBJECTIVE AND OBJECTIVE BOX
HPI: 73yMale    PMH & Surgical Hx:CAD I25.10  FH: myocardial infarction  Handoff  MEWS Score  CAD, multiple vessel  CAD, multiple vessel  Nutrition, metabolism, and development symptoms  Asthma  Diabetes  HTN (hypertension)  CAD (coronary artery disease)  OPCAB (off-pump coronary artery bypass)  S/P cataract surgery  H/O shoulder surgery  H/O total knee replacement, right      FH:  DM:  Thyroid:  Autoimmune:  Other:    SH:  Smoking:  Etoh:  Recreational Drugs:  Social Life:    Current Meds:  acetaminophen   Tablet .. 650 milliGRAM(s) Oral every 6 hours PRN  ALBUTerol    90 MICROgram(s) HFA Inhaler 2 Puff(s) Inhalation every 6 hours PRN  aspirin enteric coated 81 milliGRAM(s) Oral daily  atorvastatin 80 milliGRAM(s) Oral at bedtime  ceFAZolin  Injectable. 2000 milliGRAM(s) IV Push every 8 hours  chlorhexidine 2% Cloths 1 Application(s) Topical <User Schedule>  clopidogrel Tablet 75 milliGRAM(s) Oral daily  dextrose 40% Gel 15 Gram(s) Oral once PRN  dextrose 5%. 1000 milliLiter(s) IV Continuous <Continuous>  dextrose 50% Injectable 50 milliLiter(s) IV Push every 15 minutes  dextrose 50% Injectable 25 milliLiter(s) IV Push every 15 minutes  docusate sodium 100 milliGRAM(s) Oral three times a day  fluticasone propionate 50 MICROgram(s)/spray Nasal Spray 1 Spray(s) Both Nostrils two times a day  glucagon  Injectable 1 milliGRAM(s) IntraMuscular once PRN  heparin  Injectable 5000 Unit(s) SubCutaneous every 8 hours  insulin glargine Injectable (LANTUS) 15 Unit(s) SubCutaneous at bedtime  insulin lispro (HumaLOG) corrective regimen sliding scale   SubCutaneous Before meals and at bedtime  insulin lispro Injectable (HumaLOG) 5 Unit(s) SubCutaneous three times a day before meals  insulin regular Infusion 2 Unit(s)/Hr IV Continuous <Continuous>  metoprolol tartrate 12.5 milliGRAM(s) Oral every 12 hours  pantoprazole    Tablet 40 milliGRAM(s) Oral before breakfast  senna 2 Tablet(s) Oral at bedtime  sodium chloride 0.9%. 1000 milliLiter(s) IV Continuous <Continuous>  traMADol 25 milliGRAM(s) Oral every 6 hours PRN      Allergies:  No Known Allergies      ROS:  Denies the following except as indicated.    General: weight loss/weight gain, decreased appetite, fatigue  Eyes: Blurry vision, double vision, visual changes  ENT: Throat pain, changes in voice,   CV: palpitations, SOB, CP, cough  GI: NVD, difficulty swallowing, abdominal pain  : polyuria, dysuria  Endo: abnormal menses, temperature intolerance, decreased libido  MSK: weakness, joint pain  Skin: rash, dryness, diaphoresis  Heme: Easy bruising,bleeding  Neuro: HA, dizziness, lightheadedness, numbness tingling  Psych: Anxiety, Depression    Vital Signs Last 24 Hrs  T(C): 37.3 (07 Sep 2019 17:49), Max: 37.4 (07 Sep 2019 05:01)  T(F): 99.1 (07 Sep 2019 17:49), Max: 99.4 (07 Sep 2019 05:01)  HR: 90 (07 Sep 2019 17:00) (78 - 100)  BP: 117/70 (07 Sep 2019 10:10) (117/70 - 117/70)  BP(mean): 105 (07 Sep 2019 10:10) (105 - 105)  RR: 20 (07 Sep 2019 17:00) (10 - 20)  SpO2: 99% (07 Sep 2019 17:00) (93% - 100%)  Height (cm): 167.6 (09-04 @ 20:56)  Weight (kg): 72.7 (09-04 @ 20:56)  BMI (kg/m2): 25.9 (09-04 @ 20:56)    Constitutional: wn/wd in NAD.   HEENT: NCAT, MMM, OP clear, EOMI, , no proptosis or lid retraction  Neck: no thyromegaly or palpable thyroid nodules   Respiratory: lungs CTAB.  Cardiovascular: regular rhythm, normal S1 and S2, no audible murmurs  GI: soft, NT/ND, no masses/HSM appreciated.  Neurology: no tremors, DTR 2+  Skin: no visible rashes/lesions  Psychiatric: AAO x 3, normal affect/mood.  Ext: radial pulses intact, DP pulses intact, extremities warm, no cyanosis, clubbing or edema.       LABS:                        8.9    8.74  )-----------( 136      ( 07 Sep 2019 15:27 )             26.7     09-07    136  |  104  |  13  ----------------------------<  325<H>  4.3   |  21<L>  |  0.94    Ca    8.4      07 Sep 2019 15:27  Phos  1.4     09-07  Mg     2.1     09-07    TPro  6.0  /  Alb  3.4  /  TBili  0.3  /  DBili  x   /  AST  32  /  ALT  10  /  AlkPhos  38<L>  09-07    PT/INR - ( 07 Sep 2019 15:27 )   PT: 16.6 sec;   INR: 1.45          PTT - ( 07 Sep 2019 15:27 )  PTT:30.8 sec    Hemoglobin A1C, Whole Blood: 7.3 (09-04 @ 15:56)    Thyroid Stimulating Hormone, Serum: 0.780 (09-07 @ 03:28)    Cholesterol, Serum: 99 mg/dL (09-04-19 @ 15:56)  HDL Cholesterol, Serum: 41 mg/dL (09-04-19 @ 15:56)  Triglycerides, Serum: 66 mg/dL (09-04-19 @ 15:56)  Direct LDL: 45 mg/dL (09-04-19 @ 15:56)      CAPILLARY BLOOD GLUCOSE      POCT Blood Glucose.: 268 mg/dL (07 Sep 2019 16:58)  POCT Blood Glucose.: 290 mg/dL (07 Sep 2019 16:57)  POCT Blood Glucose.: 117 mg/dL (07 Sep 2019 11:31)  POCT Blood Glucose.: 99 mg/dL (07 Sep 2019 07:36)  POCT Blood Glucose.: 92 mg/dL (07 Sep 2019 05:25)  POCT Blood Glucose.: 97 mg/dL (07 Sep 2019 03:28)  POCT Blood Glucose.: 106 mg/dL (07 Sep 2019 01:02)  POCT Blood Glucose.: 99 mg/dL (06 Sep 2019 23:36)  POCT Blood Glucose.: 107 mg/dL (06 Sep 2019 21:57)  POCT Blood Glucose.: 138 mg/dL (06 Sep 2019 20:14)  POCT Blood Glucose.: 153 mg/dL (06 Sep 2019 18:40) HPI:  72 y/o M FORMER SMOKER/FORMER DRUG USE/FORMER ETOH USE with FHx of CAD (MI mother: age 80s) PMH of HTN, DM, asthma (denies any hospitalization/intubation);  who was referred to cardiologist Dr. Santos by PCP for abnormal ECG.  Pt. reports that 2 weeks ago; he was in the bus with his wife when he started to sweat profusely; reports of having mid-sternal; non-radiating chest tightness describes it as pulling; he thought he had indigestion and reports feeling better after having seltzer water. His wife was concerned; thus they went to see his PCP who did a EKG and referred the pt. to Dr. Santos; Pt. reports that was the only episode he had so far; denies any CP and SOB at baseline, dizziness, diaphoresis, palpitations, fatigue, LE edema, orthopnea, PND, syncope. He reports that he is very active and works 5 days/week. Subsequently; Pt had a ECHO 08/23/19 revealing EF 40-45 %; basal mid and distal inferior wall hypokinesis. EKG as per MD note revealed inferior infarction.  Pt underwent recent cardiac cath notable for three vessel disease and now presents for operating management.   Pt now seen POD #1, transitioned off insulin infusion this morning with 7 units NPH insulin.   Pt today reports eating well  Pt reports long hx of type 2 DM, never on insulin, now controlled with metformin at home, Reports adherence to low carb diet and engaging in regular physical activity, does not check glucose often at home, is followed by a primary care doctor.  No known hx of retinopathy or neuropathy and denies symptoms.     PMH & Surgical Hx:CAD I25.10  Asthma  Diabetes  HTN (hypertension)  CAD (coronary artery disease)  OPCAB (off-pump coronary artery bypass)  S/P cataract surgery  H/O shoulder surgery  H/O total knee replacement, right      FH:  DM: yes in mother,   Thyroid: denies  Autoimmune: denies  Other: MI    SH:  Smoking: former  Etoh: former  Recreational Drugs: former  Social Life: retired  and lives w wife.     Current Meds:  acetaminophen   Tablet .. 650 milliGRAM(s) Oral every 6 hours PRN  ALBUTerol    90 MICROgram(s) HFA Inhaler 2 Puff(s) Inhalation every 6 hours PRN  aspirin enteric coated 81 milliGRAM(s) Oral daily  atorvastatin 80 milliGRAM(s) Oral at bedtime  ceFAZolin  Injectable. 2000 milliGRAM(s) IV Push every 8 hours  chlorhexidine 2% Cloths 1 Application(s) Topical <User Schedule>  clopidogrel Tablet 75 milliGRAM(s) Oral daily  dextrose 40% Gel 15 Gram(s) Oral once PRN  dextrose 5%. 1000 milliLiter(s) IV Continuous <Continuous>  dextrose 50% Injectable 50 milliLiter(s) IV Push every 15 minutes  dextrose 50% Injectable 25 milliLiter(s) IV Push every 15 minutes  docusate sodium 100 milliGRAM(s) Oral three times a day  fluticasone propionate 50 MICROgram(s)/spray Nasal Spray 1 Spray(s) Both Nostrils two times a day  glucagon  Injectable 1 milliGRAM(s) IntraMuscular once PRN  heparin  Injectable 5000 Unit(s) SubCutaneous every 8 hours  insulin glargine Injectable (LANTUS) 15 Unit(s) SubCutaneous at bedtime  insulin lispro (HumaLOG) corrective regimen sliding scale   SubCutaneous Before meals and at bedtime  insulin lispro Injectable (HumaLOG) 5 Unit(s) SubCutaneous three times a day before meals  insulin regular Infusion 2 Unit(s)/Hr IV Continuous <Continuous>  metoprolol tartrate 12.5 milliGRAM(s) Oral every 12 hours  pantoprazole    Tablet 40 milliGRAM(s) Oral before breakfast  senna 2 Tablet(s) Oral at bedtime  sodium chloride 0.9%. 1000 milliLiter(s) IV Continuous <Continuous>  traMADol 25 milliGRAM(s) Oral every 6 hours PRN      Allergies:  No Known Allergies      ROS:  Denies the following except as indicated.    General: weight loss/weight gain, decreased appetite, fatigue  Eyes: Blurry vision, double vision, visual changes  ENT: Throat pain, changes in voice,   CV: palpitations, SOB, CP, cough  GI: NVD, difficulty swallowing, abdominal pain  : polyuria, dysuria  Endo: abnormal menses, temperature intolerance, decreased libido  MSK: weakness, joint pain  Skin: rash, dryness, diaphoresis  Heme: Easy bruising,bleeding  Neuro: HA, dizziness, lightheadedness, numbness tingling  Psych: Anxiety, Depression    Vital Signs Last 24 Hrs  T(C): 37.3 (07 Sep 2019 17:49), Max: 37.4 (07 Sep 2019 05:01)  T(F): 99.1 (07 Sep 2019 17:49), Max: 99.4 (07 Sep 2019 05:01)  HR: 90 (07 Sep 2019 17:00) (78 - 100)  BP: 117/70 (07 Sep 2019 10:10) (117/70 - 117/70)  BP(mean): 105 (07 Sep 2019 10:10) (105 - 105)  RR: 20 (07 Sep 2019 17:00) (10 - 20)  SpO2: 99% (07 Sep 2019 17:00) (93% - 100%)  Height (cm): 167.6 (09-04 @ 20:56)  Weight (kg): 72.7 (09-04 @ 20:56)  BMI (kg/m2): 25.9 (09-04 @ 20:56)    Constitutional: wn/wd in NAD.   HEENT: NCAT, MMM, OP clear, EOMI, , no proptosis or lid retraction  Neck: no thyromegaly or palpable thyroid nodules   Respiratory: lungs CTAB.  Cardiovascular: regular rhythm, normal S1 and S2, no audible murmurs  GI: soft, NT/ND, no masses/HSM appreciated.  Neurology: no tremors, DTR 2+  Skin: no visible rashes/lesions  Psychiatric: AAO x 3, normal affect/mood.  Ext: radial pulses intact, DP pulses intact, extremities warm, no cyanosis, clubbing or edema.       LABS:                        8.9    8.74  )-----------( 136      ( 07 Sep 2019 15:27 )             26.7     09-07    136  |  104  |  13  ----------------------------<  325<H>  4.3   |  21<L>  |  0.94    Ca    8.4      07 Sep 2019 15:27  Phos  1.4     09-07  Mg     2.1     09-07    TPro  6.0  /  Alb  3.4  /  TBili  0.3  /  DBili  x   /  AST  32  /  ALT  10  /  AlkPhos  38<L>  09-07    PT/INR - ( 07 Sep 2019 15:27 )   PT: 16.6 sec;   INR: 1.45          PTT - ( 07 Sep 2019 15:27 )  PTT:30.8 sec    Hemoglobin A1C, Whole Blood: 7.3 (09-04 @ 15:56)    Thyroid Stimulating Hormone, Serum: 0.780 (09-07 @ 03:28)    Cholesterol, Serum: 99 mg/dL (09-04-19 @ 15:56)  HDL Cholesterol, Serum: 41 mg/dL (09-04-19 @ 15:56)  Triglycerides, Serum: 66 mg/dL (09-04-19 @ 15:56)  Direct LDL: 45 mg/dL (09-04-19 @ 15:56)      CAPILLARY BLOOD GLUCOSE      POCT Blood Glucose.: 268 mg/dL (07 Sep 2019 16:58)  POCT Blood Glucose.: 290 mg/dL (07 Sep 2019 16:57)  POCT Blood Glucose.: 117 mg/dL (07 Sep 2019 11:31)  POCT Blood Glucose.: 99 mg/dL (07 Sep 2019 07:36)  POCT Blood Glucose.: 92 mg/dL (07 Sep 2019 05:25)  POCT Blood Glucose.: 97 mg/dL (07 Sep 2019 03:28)  POCT Blood Glucose.: 106 mg/dL (07 Sep 2019 01:02)  POCT Blood Glucose.: 99 mg/dL (06 Sep 2019 23:36)  POCT Blood Glucose.: 107 mg/dL (06 Sep 2019 21:57)  POCT Blood Glucose.: 138 mg/dL (06 Sep 2019 20:14)  POCT Blood Glucose.: 153 mg/dL (06 Sep 2019 18:40)

## 2019-09-07 NOTE — PHYSICAL THERAPY INITIAL EVALUATION ADULT - PERTINENT HX OF CURRENT PROBLEM, REHAB EVAL
74 y/o M FORMER SMOKER/FORMER DRUG USE/FORMER ETOH USE with FHx of CAD (MI mother: age 80s) PMH of HTN, DM, asthma (denies any hospitalization/intubation);  who was referred to cardiologist Dr. Santos by PCP for abnormal ECG.

## 2019-09-07 NOTE — CHART NOTE - NSCHARTNOTEFT_GEN_A_CORE
As per Dr. Navas, left pleural chest tube removed at bedside without incident. U stitch tied down in place and occlusive dressing applied. Subsequently mediastinal chest tubes x 2 removed at bedside, occlusive dressing applied. Patient tolerated procedure well. Follow up CXR stable with no obvious ptx. Alert/Awake

## 2019-09-07 NOTE — CONSULT NOTE ADULT - ATTENDING COMMENTS
A/P 73yMale with hx of DM presenting for     1.  DM:  lantus 15 units at bedtime, 5 units lispro TID with meals  Continue lispro moderate dose scale with meals and at bedtime.   Continue consistent carb diet  FSG Goal 100-180    2.  Hyperlipidemia - goal LDL < 70    3.  Obesity - outpatient medical management.      For discharge:     Pt is advised to follow up with me at discharge by calling . A/P 73yMale with hx of DM presenting for management of CAD now s.p OPCAB with good glycemic control    1.  DM: type 2, controlled, complicated.   lantus 15 units at bedtime, 5 units lispro TID with meals  Continue lispro moderate dose scale with meals and at bedtime.   Continue consistent carb diet  FSG Goal 100-180    2.  Hyperlipidemia - goal LDL < 70  continue atorvastatin 80mg daily    For discharge:   can resume home metformin and consider additional jardiance 10mg daily  Pt is advised to follow up with me at discharge by calling .

## 2019-09-07 NOTE — PHYSICAL THERAPY INITIAL EVALUATION ADULT - GENERAL OBSERVATIONS, REHAB EVAL
Pt received standing with CHENTE Dickinson, +TPM, +tammy drains x4, +CT x1, +sternal incision C/D/I, +R radial a-line, +turner, +telemetry, +pulse ox, NAD, agreeable to PT.

## 2019-09-08 LAB
ALBUMIN SERPL ELPH-MCNC: 3.4 G/DL — SIGNIFICANT CHANGE UP (ref 3.3–5)
ALBUMIN SERPL ELPH-MCNC: 3.9 G/DL — SIGNIFICANT CHANGE UP (ref 3.3–5)
ALP SERPL-CCNC: 33 U/L — LOW (ref 40–120)
ALP SERPL-CCNC: 38 U/L — LOW (ref 40–120)
ALT FLD-CCNC: 8 U/L — LOW (ref 10–45)
ALT FLD-CCNC: 8 U/L — LOW (ref 10–45)
ANION GAP SERPL CALC-SCNC: 10 MMOL/L — SIGNIFICANT CHANGE UP (ref 5–17)
ANION GAP SERPL CALC-SCNC: 8 MMOL/L — SIGNIFICANT CHANGE UP (ref 5–17)
APTT BLD: 31.6 SEC — SIGNIFICANT CHANGE UP (ref 27.5–36.3)
AST SERPL-CCNC: 30 U/L — SIGNIFICANT CHANGE UP (ref 10–40)
AST SERPL-CCNC: 32 U/L — SIGNIFICANT CHANGE UP (ref 10–40)
BILIRUB SERPL-MCNC: 0.3 MG/DL — SIGNIFICANT CHANGE UP (ref 0.2–1.2)
BILIRUB SERPL-MCNC: 0.4 MG/DL — SIGNIFICANT CHANGE UP (ref 0.2–1.2)
BUN SERPL-MCNC: 11 MG/DL — SIGNIFICANT CHANGE UP (ref 7–23)
BUN SERPL-MCNC: 11 MG/DL — SIGNIFICANT CHANGE UP (ref 7–23)
CALCIUM SERPL-MCNC: 8.4 MG/DL — SIGNIFICANT CHANGE UP (ref 8.4–10.5)
CALCIUM SERPL-MCNC: 8.5 MG/DL — SIGNIFICANT CHANGE UP (ref 8.4–10.5)
CHLORIDE SERPL-SCNC: 103 MMOL/L — SIGNIFICANT CHANGE UP (ref 96–108)
CHLORIDE SERPL-SCNC: 106 MMOL/L — SIGNIFICANT CHANGE UP (ref 96–108)
CO2 SERPL-SCNC: 23 MMOL/L — SIGNIFICANT CHANGE UP (ref 22–31)
CO2 SERPL-SCNC: 24 MMOL/L — SIGNIFICANT CHANGE UP (ref 22–31)
CREAT SERPL-MCNC: 0.88 MG/DL — SIGNIFICANT CHANGE UP (ref 0.5–1.3)
CREAT SERPL-MCNC: 0.9 MG/DL — SIGNIFICANT CHANGE UP (ref 0.5–1.3)
GAS PNL BLDA: SIGNIFICANT CHANGE UP
GAS PNL BLDA: SIGNIFICANT CHANGE UP
GLUCOSE BLDC GLUCOMTR-MCNC: 145 MG/DL — HIGH (ref 70–99)
GLUCOSE BLDC GLUCOMTR-MCNC: 180 MG/DL — HIGH (ref 70–99)
GLUCOSE BLDC GLUCOMTR-MCNC: 266 MG/DL — HIGH (ref 70–99)
GLUCOSE BLDC GLUCOMTR-MCNC: 84 MG/DL — SIGNIFICANT CHANGE UP (ref 70–99)
GLUCOSE SERPL-MCNC: 136 MG/DL — HIGH (ref 70–99)
GLUCOSE SERPL-MCNC: 160 MG/DL — HIGH (ref 70–99)
HCT VFR BLD CALC: 22.3 % — LOW (ref 39–50)
HCT VFR BLD CALC: 24.7 % — LOW (ref 39–50)
HGB BLD-MCNC: 7.4 G/DL — LOW (ref 13–17)
HGB BLD-MCNC: 8.2 G/DL — LOW (ref 13–17)
INR BLD: 1.29 — HIGH (ref 0.88–1.16)
INR BLD: 1.43 — HIGH (ref 0.88–1.16)
LACTATE SERPL-SCNC: 1.2 MMOL/L — SIGNIFICANT CHANGE UP (ref 0.5–2)
MAGNESIUM SERPL-MCNC: 2.2 MG/DL — SIGNIFICANT CHANGE UP (ref 1.6–2.6)
MAGNESIUM SERPL-MCNC: 2.3 MG/DL — SIGNIFICANT CHANGE UP (ref 1.6–2.6)
MCHC RBC-ENTMCNC: 32.6 PG — SIGNIFICANT CHANGE UP (ref 27–34)
MCHC RBC-ENTMCNC: 32.7 PG — SIGNIFICANT CHANGE UP (ref 27–34)
MCHC RBC-ENTMCNC: 33.2 GM/DL — SIGNIFICANT CHANGE UP (ref 32–36)
MCHC RBC-ENTMCNC: 33.2 GM/DL — SIGNIFICANT CHANGE UP (ref 32–36)
MCV RBC AUTO: 98.2 FL — SIGNIFICANT CHANGE UP (ref 80–100)
MCV RBC AUTO: 98.4 FL — SIGNIFICANT CHANGE UP (ref 80–100)
NRBC # BLD: 0 /100 WBCS — SIGNIFICANT CHANGE UP (ref 0–0)
NRBC # BLD: 0 /100 WBCS — SIGNIFICANT CHANGE UP (ref 0–0)
PHOSPHATE SERPL-MCNC: 1.8 MG/DL — LOW (ref 2.5–4.5)
PHOSPHATE SERPL-MCNC: 2.4 MG/DL — LOW (ref 2.5–4.5)
PLATELET # BLD AUTO: 117 K/UL — LOW (ref 150–400)
PLATELET # BLD AUTO: 99 K/UL — LOW (ref 150–400)
POTASSIUM SERPL-MCNC: 4 MMOL/L — SIGNIFICANT CHANGE UP (ref 3.5–5.3)
POTASSIUM SERPL-MCNC: 4.5 MMOL/L — SIGNIFICANT CHANGE UP (ref 3.5–5.3)
POTASSIUM SERPL-SCNC: 4 MMOL/L — SIGNIFICANT CHANGE UP (ref 3.5–5.3)
POTASSIUM SERPL-SCNC: 4.5 MMOL/L — SIGNIFICANT CHANGE UP (ref 3.5–5.3)
PROT SERPL-MCNC: 5.7 G/DL — LOW (ref 6–8.3)
PROT SERPL-MCNC: 6.3 G/DL — SIGNIFICANT CHANGE UP (ref 6–8.3)
PROTHROM AB SERPL-ACNC: 14.7 SEC — HIGH (ref 10–12.9)
PROTHROM AB SERPL-ACNC: 16.3 SEC — HIGH (ref 10–12.9)
RBC # BLD: 2.27 M/UL — LOW (ref 4.2–5.8)
RBC # BLD: 2.51 M/UL — LOW (ref 4.2–5.8)
RBC # FLD: 13.1 % — SIGNIFICANT CHANGE UP (ref 10.3–14.5)
RBC # FLD: 13.2 % — SIGNIFICANT CHANGE UP (ref 10.3–14.5)
SODIUM SERPL-SCNC: 136 MMOL/L — SIGNIFICANT CHANGE UP (ref 135–145)
SODIUM SERPL-SCNC: 138 MMOL/L — SIGNIFICANT CHANGE UP (ref 135–145)
WBC # BLD: 6.51 K/UL — SIGNIFICANT CHANGE UP (ref 3.8–10.5)
WBC # BLD: 8.98 K/UL — SIGNIFICANT CHANGE UP (ref 3.8–10.5)
WBC # FLD AUTO: 6.51 K/UL — SIGNIFICANT CHANGE UP (ref 3.8–10.5)
WBC # FLD AUTO: 8.98 K/UL — SIGNIFICANT CHANGE UP (ref 3.8–10.5)

## 2019-09-08 PROCEDURE — 71045 X-RAY EXAM CHEST 1 VIEW: CPT | Mod: 26

## 2019-09-08 PROCEDURE — 99291 CRITICAL CARE FIRST HOUR: CPT

## 2019-09-08 RX ORDER — POTASSIUM CHLORIDE 20 MEQ
20 PACKET (EA) ORAL ONCE
Refills: 0 | Status: COMPLETED | OUTPATIENT
Start: 2019-09-08 | End: 2019-09-08

## 2019-09-08 RX ORDER — METOPROLOL TARTRATE 50 MG
12.5 TABLET ORAL EVERY 12 HOURS
Refills: 0 | Status: DISCONTINUED | OUTPATIENT
Start: 2019-09-08 | End: 2019-09-11

## 2019-09-08 RX ORDER — NOREPINEPHRINE BITARTRATE/D5W 8 MG/250ML
0.05 PLASTIC BAG, INJECTION (ML) INTRAVENOUS
Qty: 16 | Refills: 0 | Status: DISCONTINUED | OUTPATIENT
Start: 2019-09-08 | End: 2019-09-08

## 2019-09-08 RX ORDER — POTASSIUM PHOSPHATE, MONOBASIC POTASSIUM PHOSPHATE, DIBASIC 236; 224 MG/ML; MG/ML
15 INJECTION, SOLUTION INTRAVENOUS ONCE
Refills: 0 | Status: COMPLETED | OUTPATIENT
Start: 2019-09-08 | End: 2019-09-08

## 2019-09-08 RX ORDER — MAGNESIUM SULFATE 500 MG/ML
2 VIAL (ML) INJECTION ONCE
Refills: 0 | Status: COMPLETED | OUTPATIENT
Start: 2019-09-08 | End: 2019-09-08

## 2019-09-08 RX ORDER — CALCIUM GLUCONATE 100 MG/ML
2 VIAL (ML) INTRAVENOUS ONCE
Refills: 0 | Status: COMPLETED | OUTPATIENT
Start: 2019-09-08 | End: 2019-09-08

## 2019-09-08 RX ORDER — ALBUMIN HUMAN 25 %
250 VIAL (ML) INTRAVENOUS ONCE
Refills: 0 | Status: COMPLETED | OUTPATIENT
Start: 2019-09-08 | End: 2019-09-08

## 2019-09-08 RX ORDER — SODIUM,POTASSIUM PHOSPHATES 278-250MG
1 POWDER IN PACKET (EA) ORAL
Refills: 0 | Status: COMPLETED | OUTPATIENT
Start: 2019-09-08 | End: 2019-09-08

## 2019-09-08 RX ORDER — TRAMADOL HYDROCHLORIDE 50 MG/1
25 TABLET ORAL EVERY 6 HOURS
Refills: 0 | Status: DISCONTINUED | OUTPATIENT
Start: 2019-09-08 | End: 2019-09-11

## 2019-09-08 RX ADMIN — TRAMADOL HYDROCHLORIDE 25 MILLIGRAM(S): 50 TABLET ORAL at 22:48

## 2019-09-08 RX ADMIN — Medication 81 MILLIGRAM(S): at 11:44

## 2019-09-08 RX ADMIN — CLOPIDOGREL BISULFATE 75 MILLIGRAM(S): 75 TABLET, FILM COATED ORAL at 11:44

## 2019-09-08 RX ADMIN — HEPARIN SODIUM 5000 UNIT(S): 5000 INJECTION INTRAVENOUS; SUBCUTANEOUS at 09:47

## 2019-09-08 RX ADMIN — HEPARIN SODIUM 5000 UNIT(S): 5000 INJECTION INTRAVENOUS; SUBCUTANEOUS at 01:34

## 2019-09-08 RX ADMIN — Medication 2000 MILLIGRAM(S): at 07:14

## 2019-09-08 RX ADMIN — Medication 50 GRAM(S): at 01:30

## 2019-09-08 RX ADMIN — ATORVASTATIN CALCIUM 80 MILLIGRAM(S): 80 TABLET, FILM COATED ORAL at 21:23

## 2019-09-08 RX ADMIN — Medication 125 MILLILITER(S): at 01:34

## 2019-09-08 RX ADMIN — Medication 7 UNIT(S): at 11:43

## 2019-09-08 RX ADMIN — CHLORHEXIDINE GLUCONATE 1 APPLICATION(S): 213 SOLUTION TOPICAL at 07:20

## 2019-09-08 RX ADMIN — TRAMADOL HYDROCHLORIDE 25 MILLIGRAM(S): 50 TABLET ORAL at 21:22

## 2019-09-08 RX ADMIN — PANTOPRAZOLE SODIUM 40 MILLIGRAM(S): 20 TABLET, DELAYED RELEASE ORAL at 07:18

## 2019-09-08 RX ADMIN — Medication 12.5 MILLIGRAM(S): at 11:44

## 2019-09-08 RX ADMIN — Medication 12.5 MILLIGRAM(S): at 17:53

## 2019-09-08 RX ADMIN — Medication 7 UNIT(S): at 08:04

## 2019-09-08 RX ADMIN — Medication 6: at 21:23

## 2019-09-08 RX ADMIN — Medication 7 UNIT(S): at 17:52

## 2019-09-08 RX ADMIN — Medication 2: at 11:43

## 2019-09-08 RX ADMIN — Medication 200 GRAM(S): at 01:15

## 2019-09-08 RX ADMIN — Medication 3.41 MICROGRAM(S)/KG/MIN: at 01:40

## 2019-09-08 RX ADMIN — Medication 1 SPRAY(S): at 17:54

## 2019-09-08 RX ADMIN — SENNA PLUS 2 TABLET(S): 8.6 TABLET ORAL at 21:23

## 2019-09-08 RX ADMIN — Medication 100 MILLIEQUIVALENT(S): at 07:13

## 2019-09-08 RX ADMIN — Medication 100 MILLIGRAM(S): at 21:23

## 2019-09-08 RX ADMIN — Medication 100 MILLIGRAM(S): at 07:14

## 2019-09-08 RX ADMIN — INSULIN GLARGINE 20 UNIT(S): 100 INJECTION, SOLUTION SUBCUTANEOUS at 21:22

## 2019-09-08 RX ADMIN — HEPARIN SODIUM 5000 UNIT(S): 5000 INJECTION INTRAVENOUS; SUBCUTANEOUS at 17:52

## 2019-09-08 RX ADMIN — POTASSIUM PHOSPHATE, MONOBASIC POTASSIUM PHOSPHATE, DIBASIC 63.75 MILLIMOLE(S): 236; 224 INJECTION, SOLUTION INTRAVENOUS at 09:46

## 2019-09-08 NOTE — CHART NOTE - NSCHARTNOTEFT_GEN_A_CORE
As per Dr. Navas, right pleural chest tube removed at bedside without incident. Occlusive dressing applied. Patient tolerated procedure well. Follow up CXR stable with no obvious ptx

## 2019-09-08 NOTE — PROGRESS NOTE ADULT - SUBJECTIVE AND OBJECTIVE BOX
INTERVAL HPI/OVERNIGHT EVENTS:    POD#2 OPCAB x 3  EF 55%    74yo Male Former smoker/Drug use/ETOH use, HTN, DM, asthma with exertional CP and abnormal EKG (inferior infarction)    ECHO 8/23: EF 40%; basal mid and distal inferior wall hypokinesis.     Cath 9/4:  2v , mild LV hypokinesis, EF 50%, 90% dRCA, 70% RPDA, 30-50% dLM, 90% oLAD, 80% mLAD, 30-50% pLCx    To OR 9/6  arrived to ICU - no infusions; extubated in short post-op course  Beta blocker restarted post-op   diuretic dosing with significant UO and overnight - drop in BP requiring volume resuscitation (transient Levo requirement) - total 750 albumin given     Patient doing well - OOB in chair at this time - no orthostasis sxs   Up and Ambulating with staff this am     PMHx includes but is not limited to:   S/P cataract surgery  H/O shoulder surgery: B/L  H/O total knee replacement, right    ICU Vital Signs Last 24 Hrs  T(C): 36.7 (08 Sep 2019 09:00), Max: 37.3 (07 Sep 2019 17:49)  T(F): 98 (08 Sep 2019 09:00), Max: 99.1 (07 Sep 2019 17:49)  HR: 102 (08 Sep 2019 11:00) (78 - 106) sinus tach  BP: 99/61 (08 Sep 2019 01:00) (99/61 - 115/64)  BP(mean): 72 (08 Sep 2019 01:00) (72 - 84)  ABP: 134/62 (08 Sep 2019 11:00) (98/40 - 158/54)  ABP(mean): 88 (08 Sep 2019 11:00) (54 - 90)  SpO2: 97% (08 Sep 2019 11:00) (92% - 99%) RA    Qtts: None    I&O's Summary    07 Sep 2019 07:01  -  08 Sep 2019 07:00  --------------------------------------------------------  IN: 2875.8 mL / OUT: 3125 mL / NET: -249.2 mL    08 Sep 2019 07:01  -  08 Sep 2019 11:48  --------------------------------------------------------  IN: 290 mL / OUT: 150 mL / NET: 140 mL    Physical Exam    Heart - regular/tachy - no rub/gallop appreciated  Lungs - CTA anterior/posterior - no rhonchi/wheeze  Abd - (+)BS soft NTND (-)r/r/g  Ext - warm to touch; no cyanosis/clubbing   Chest - op bandage in place  Neuro - alert/oriented and interactive - alert and oriented ; non-focal   Skin - no rash     LABS:                        7.4    6.51  )-----------( 99       ( 08 Sep 2019 05:31 )             22.3     09-08    138  |  106  |  11  ----------------------------<  136<H>  4.0   |  24  |  0.90    Ca    8.5      08 Sep 2019 05:31  Phos  2.4     09-08  Mg     2.2     09-08    TPro  5.7<L>  /  Alb  3.4  /  TBili  0.4  /  DBili  x   /  AST  30  /  ALT  8<L>  /  AlkPhos  33<L>  09-08    PT/INR - ( 08 Sep 2019 05:31 )   PT: 16.3 sec;   INR: 1.43     PTT - ( 08 Sep 2019 05:31 )  PTT:31.6 sec    ABG - ( 08 Sep 2019 05:17 ) 7.49/32/78/96    RADIOLOGY & ADDITIONAL STUDIES: reviewed    Patient with anginal sxs and EKG findings of inferior MI now s/p OPCAB x 3 - doing well    1. CV  hemodynamically stable   suspect orthostasis related to post-op anemia and diuresis - improved and will attempt to avoid transfusion unless orthostasis or symptomatic  ASA/Plavix/statin  restart beta blocker - and titrate up and clinical scenario allows  LA 1.2    2. Pulm   on RA  incentive spirometry/ambulation   plan remove chest tube today  restart nebs    3. Endocrine  Maintain glycemic control  - /145  cont lantus/premeal and ISS ; HgA1c 7.3  endocrine following  - plan restart po meds on discharge with endo followup    DVT and GI prophylaxis    d/w patient/staff and CTS    I have spent/provided stated minutes of critical care time to this patient: 60 min

## 2019-09-08 NOTE — PROGRESS NOTE ADULT - SUBJECTIVE AND OBJECTIVE BOX
INTERVAL HPI/OVERNIGHT EVENTS:    Patient is a 73y old  Male who presents with a chief complaint of CAD  now sp OPCAB POD #2  insulin requirements reviewed  pt eating well  no acute overnight events  denies nausea, vomiting, abdominal pain, SOB, chest pain palpitations, urinary sx, headache, dizziness, subjective fever, chills    Pt reports the following symptoms:    CONSTITUTIONAL:  Negative fever or chills, feels well, good appetite  EYES:  Negative  blurry vision or double vision  CARDIOVASCULAR:  Negative for chest pain or palpitations  RESPIRATORY:  Negative for cough, wheezing, or SOB   GASTROINTESTINAL:  Negative for nausea, vomiting, diarrhea, constipation, or abdominal pain  GENITOURINARY:  Negative frequency, urgency or dysuria  NEUROLOGIC:  No headache, confusion, dizziness, lightheadedness    MEDICATIONS  (STANDING):  aspirin enteric coated 81 milliGRAM(s) Oral daily  atorvastatin 80 milliGRAM(s) Oral at bedtime  chlorhexidine 2% Cloths 1 Application(s) Topical <User Schedule>  clopidogrel Tablet 75 milliGRAM(s) Oral daily  dextrose 5%. 1000 milliLiter(s) (50 mL/Hr) IV Continuous <Continuous>  dextrose 50% Injectable 50 milliLiter(s) IV Push every 15 minutes  dextrose 50% Injectable 25 milliLiter(s) IV Push every 15 minutes  docusate sodium 100 milliGRAM(s) Oral three times a day  fluticasone propionate 50 MICROgram(s)/spray Nasal Spray 1 Spray(s) Both Nostrils two times a day  heparin  Injectable 5000 Unit(s) SubCutaneous every 8 hours  insulin glargine Injectable (LANTUS) 20 Unit(s) SubCutaneous at bedtime  insulin lispro (HumaLOG) corrective regimen sliding scale   SubCutaneous Before meals and at bedtime  insulin lispro Injectable (HumaLOG) 7 Unit(s) SubCutaneous three times a day before meals  metoprolol tartrate 12.5 milliGRAM(s) Oral every 12 hours  pantoprazole    Tablet 40 milliGRAM(s) Oral before breakfast  senna 2 Tablet(s) Oral at bedtime  sodium chloride 0.9%. 1000 milliLiter(s) (10 mL/Hr) IV Continuous <Continuous>    MEDICATIONS  (PRN):  acetaminophen   Tablet .. 650 milliGRAM(s) Oral every 6 hours PRN Temp greater or equal to 38C (100.4F), Mild Pain (1 - 3)  ALBUTerol    90 MICROgram(s) HFA Inhaler 2 Puff(s) Inhalation every 6 hours PRN Shortness of Breath  dextrose 40% Gel 15 Gram(s) Oral once PRN Blood Glucose LESS THAN 70 milliGRAM(s)/deciliter  glucagon  Injectable 1 milliGRAM(s) IntraMuscular once PRN Glucose LESS THAN 70 milligrams/deciliter  traMADol 25 milliGRAM(s) Oral every 6 hours PRN Moderate Pain (4 - 6)      Past medical history reviewed  Family history reviewed  Social history reviewed    PHYSICAL EXAM  Vital Signs Last 24 Hrs  T(C): 37.2 (08 Sep 2019 21:36), Max: 37.6 (08 Sep 2019 17:06)  T(F): 99 (08 Sep 2019 21:36), Max: 99.6 (08 Sep 2019 17:06)  HR: 90 (08 Sep 2019 22:00) (78 - 106)  BP: 91/61 (08 Sep 2019 22:00) (91/61 - 131/74)  BP(mean): 69 (08 Sep 2019 22:00) (69 - 99)  RR: 24 (08 Sep 2019 22:00) (16 - 36)  SpO2: 97% (08 Sep 2019 22:00) (92% - 98%)    Constitutional: wn/wd in NAD.   HEENT: NCAT, MMM, OP clear, EOMI, no proptosis or lid retraction  Neck: no thyromegaly or palpable thyroid nodules   Respiratory: lungs CTAB.  Cardiovascular: regular rhythm, normal S1 and S2, no audible murmurs, no peripheral edema  GI: soft, NT/ND, no masses/HSM appreciated.  Neurology: no tremors, DTR 2+  Skin: no visible rashes/lesions  Psychiatric: AAO x 3, normal affect/mood.    LABS:                        8.2    8.98  )-----------( 117      ( 08 Sep 2019 12:00 )             24.7     09-08    136  |  103  |  11  ----------------------------<  160<H>  4.5   |  23  |  0.88    Ca    8.4      08 Sep 2019 12:00  Phos  1.8     09-08  Mg     2.3     09-08    TPro  6.3  /  Alb  3.9  /  TBili  0.3  /  DBili  x   /  AST  32  /  ALT  8<L>  /  AlkPhos  38<L>  09-08    PT/INR - ( 08 Sep 2019 12:00 )   PT: 14.7 sec;   INR: 1.29          PTT - ( 08 Sep 2019 05:31 )  PTT:31.6 sec    Thyroid Stimulating Hormone, Serum: 0.780 uIU/mL (09-07 @ 03:28)      HbA1C: 7.3 % (09-04 @ 15:56)    CAPILLARY BLOOD GLUCOSE      POCT Blood Glucose.: 266 mg/dL (08 Sep 2019 21:10)  POCT Blood Glucose.: 84 mg/dL (08 Sep 2019 16:53)  POCT Blood Glucose.: 180 mg/dL (08 Sep 2019 11:12)  POCT Blood Glucose.: 145 mg/dL (08 Sep 2019 07:17)      Direct LDL: 45 mg/dL (09-04-19 @ 15:56)

## 2019-09-09 LAB
ANION GAP SERPL CALC-SCNC: 8 MMOL/L — SIGNIFICANT CHANGE UP (ref 5–17)
APTT BLD: 35.9 SEC — SIGNIFICANT CHANGE UP (ref 27.5–36.3)
BUN SERPL-MCNC: 11 MG/DL — SIGNIFICANT CHANGE UP (ref 7–23)
CALCIUM SERPL-MCNC: 8.3 MG/DL — LOW (ref 8.4–10.5)
CHLORIDE SERPL-SCNC: 104 MMOL/L — SIGNIFICANT CHANGE UP (ref 96–108)
CO2 SERPL-SCNC: 26 MMOL/L — SIGNIFICANT CHANGE UP (ref 22–31)
CREAT SERPL-MCNC: 0.89 MG/DL — SIGNIFICANT CHANGE UP (ref 0.5–1.3)
GLUCOSE BLDC GLUCOMTR-MCNC: 111 MG/DL — HIGH (ref 70–99)
GLUCOSE BLDC GLUCOMTR-MCNC: 113 MG/DL — HIGH (ref 70–99)
GLUCOSE BLDC GLUCOMTR-MCNC: 87 MG/DL — SIGNIFICANT CHANGE UP (ref 70–99)
GLUCOSE BLDC GLUCOMTR-MCNC: 96 MG/DL — SIGNIFICANT CHANGE UP (ref 70–99)
GLUCOSE SERPL-MCNC: 76 MG/DL — SIGNIFICANT CHANGE UP (ref 70–99)
HCT VFR BLD CALC: 28.2 % — LOW (ref 39–50)
HGB BLD-MCNC: 9.1 G/DL — LOW (ref 13–17)
INR BLD: 1.23 — HIGH (ref 0.88–1.16)
MAGNESIUM SERPL-MCNC: 2.1 MG/DL — SIGNIFICANT CHANGE UP (ref 1.6–2.6)
MCHC RBC-ENTMCNC: 31.8 PG — SIGNIFICANT CHANGE UP (ref 27–34)
MCHC RBC-ENTMCNC: 32.3 GM/DL — SIGNIFICANT CHANGE UP (ref 32–36)
MCV RBC AUTO: 98.6 FL — SIGNIFICANT CHANGE UP (ref 80–100)
NRBC # BLD: 0 /100 WBCS — SIGNIFICANT CHANGE UP (ref 0–0)
PHOSPHATE SERPL-MCNC: 2.3 MG/DL — LOW (ref 2.5–4.5)
PLATELET # BLD AUTO: 106 K/UL — LOW (ref 150–400)
POTASSIUM SERPL-MCNC: 3.8 MMOL/L — SIGNIFICANT CHANGE UP (ref 3.5–5.3)
POTASSIUM SERPL-SCNC: 3.8 MMOL/L — SIGNIFICANT CHANGE UP (ref 3.5–5.3)
PROTHROM AB SERPL-ACNC: 14 SEC — HIGH (ref 10–12.9)
RBC # BLD: 2.86 M/UL — LOW (ref 4.2–5.8)
RBC # FLD: 13.2 % — SIGNIFICANT CHANGE UP (ref 10.3–14.5)
SODIUM SERPL-SCNC: 138 MMOL/L — SIGNIFICANT CHANGE UP (ref 135–145)
WBC # BLD: 6.3 K/UL — SIGNIFICANT CHANGE UP (ref 3.8–10.5)
WBC # FLD AUTO: 6.3 K/UL — SIGNIFICANT CHANGE UP (ref 3.8–10.5)

## 2019-09-09 PROCEDURE — 32557 INSERT CATH PLEURA W/ IMAGE: CPT

## 2019-09-09 PROCEDURE — 99291 CRITICAL CARE FIRST HOUR: CPT

## 2019-09-09 PROCEDURE — 99232 SBSQ HOSP IP/OBS MODERATE 35: CPT | Mod: GC

## 2019-09-09 PROCEDURE — 71045 X-RAY EXAM CHEST 1 VIEW: CPT | Mod: 26,76

## 2019-09-09 RX ORDER — INSULIN GLARGINE 100 [IU]/ML
16 INJECTION, SOLUTION SUBCUTANEOUS AT BEDTIME
Refills: 0 | Status: DISCONTINUED | OUTPATIENT
Start: 2019-09-09 | End: 2019-09-10

## 2019-09-09 RX ORDER — POTASSIUM CHLORIDE 20 MEQ
40 PACKET (EA) ORAL ONCE
Refills: 0 | Status: COMPLETED | OUTPATIENT
Start: 2019-09-09 | End: 2019-09-09

## 2019-09-09 RX ORDER — ALBUMIN HUMAN 25 %
250 VIAL (ML) INTRAVENOUS
Refills: 0 | Status: COMPLETED | OUTPATIENT
Start: 2019-09-09 | End: 2019-09-09

## 2019-09-09 RX ORDER — SODIUM CHLORIDE 9 MG/ML
3 INJECTION INTRAMUSCULAR; INTRAVENOUS; SUBCUTANEOUS EVERY 8 HOURS
Refills: 0 | Status: DISCONTINUED | OUTPATIENT
Start: 2019-09-09 | End: 2019-09-11

## 2019-09-09 RX ADMIN — TRAMADOL HYDROCHLORIDE 25 MILLIGRAM(S): 50 TABLET ORAL at 12:03

## 2019-09-09 RX ADMIN — Medication 125 MILLILITER(S): at 12:15

## 2019-09-09 RX ADMIN — TRAMADOL HYDROCHLORIDE 25 MILLIGRAM(S): 50 TABLET ORAL at 11:02

## 2019-09-09 RX ADMIN — Medication 125 MILLILITER(S): at 12:16

## 2019-09-09 RX ADMIN — Medication 7 UNIT(S): at 17:16

## 2019-09-09 RX ADMIN — TRAMADOL HYDROCHLORIDE 25 MILLIGRAM(S): 50 TABLET ORAL at 23:15

## 2019-09-09 RX ADMIN — Medication 100 MILLIGRAM(S): at 05:41

## 2019-09-09 RX ADMIN — TRAMADOL HYDROCHLORIDE 25 MILLIGRAM(S): 50 TABLET ORAL at 22:45

## 2019-09-09 RX ADMIN — SENNA PLUS 2 TABLET(S): 8.6 TABLET ORAL at 22:45

## 2019-09-09 RX ADMIN — Medication 100 MILLIGRAM(S): at 22:45

## 2019-09-09 RX ADMIN — Medication 650 MILLIGRAM(S): at 08:34

## 2019-09-09 RX ADMIN — SODIUM CHLORIDE 3 MILLILITER(S): 9 INJECTION INTRAMUSCULAR; INTRAVENOUS; SUBCUTANEOUS at 21:54

## 2019-09-09 RX ADMIN — Medication 7 UNIT(S): at 11:23

## 2019-09-09 RX ADMIN — Medication 40 MILLIEQUIVALENT(S): at 04:47

## 2019-09-09 RX ADMIN — Medication 12.5 MILLIGRAM(S): at 05:41

## 2019-09-09 RX ADMIN — HEPARIN SODIUM 5000 UNIT(S): 5000 INJECTION INTRAVENOUS; SUBCUTANEOUS at 17:16

## 2019-09-09 RX ADMIN — PANTOPRAZOLE SODIUM 40 MILLIGRAM(S): 20 TABLET, DELAYED RELEASE ORAL at 06:56

## 2019-09-09 RX ADMIN — Medication 12.5 MILLIGRAM(S): at 18:21

## 2019-09-09 RX ADMIN — ATORVASTATIN CALCIUM 80 MILLIGRAM(S): 80 TABLET, FILM COATED ORAL at 22:45

## 2019-09-09 RX ADMIN — Medication 7 UNIT(S): at 06:56

## 2019-09-09 RX ADMIN — CLOPIDOGREL BISULFATE 75 MILLIGRAM(S): 75 TABLET, FILM COATED ORAL at 08:34

## 2019-09-09 RX ADMIN — Medication 1 SPRAY(S): at 05:46

## 2019-09-09 RX ADMIN — Medication 650 MILLIGRAM(S): at 09:34

## 2019-09-09 RX ADMIN — Medication 100 MILLIGRAM(S): at 13:28

## 2019-09-09 RX ADMIN — HEPARIN SODIUM 5000 UNIT(S): 5000 INJECTION INTRAVENOUS; SUBCUTANEOUS at 08:34

## 2019-09-09 RX ADMIN — Medication 650 MILLIGRAM(S): at 17:30

## 2019-09-09 RX ADMIN — Medication 650 MILLIGRAM(S): at 16:41

## 2019-09-09 RX ADMIN — CHLORHEXIDINE GLUCONATE 1 APPLICATION(S): 213 SOLUTION TOPICAL at 05:42

## 2019-09-09 RX ADMIN — SODIUM CHLORIDE 3 MILLILITER(S): 9 INJECTION INTRAMUSCULAR; INTRAVENOUS; SUBCUTANEOUS at 10:47

## 2019-09-09 RX ADMIN — Medication 81 MILLIGRAM(S): at 08:34

## 2019-09-09 RX ADMIN — HEPARIN SODIUM 5000 UNIT(S): 5000 INJECTION INTRAVENOUS; SUBCUTANEOUS at 00:10

## 2019-09-09 RX ADMIN — INSULIN GLARGINE 16 UNIT(S): 100 INJECTION, SOLUTION SUBCUTANEOUS at 22:44

## 2019-09-09 NOTE — DIETITIAN INITIAL EVALUATION ADULT. - ADD RECOMMEND
1) Continue on DASH/TLC, CSTCHO diet  2) [place] pts food preferences  3) Encourage protein options 2/2 increased needs post-op

## 2019-09-09 NOTE — PROGRESS NOTE ADULT - ASSESSMENT
This is a 74 y/o male, former smoker/drug user/ETOH user, with FHx of CAD (MI mother: age 80s) and PMHx of HTN, DM, asthma (denies any hospitalization/intubations) who was referred to her cardiologist Dr. Santos by PCP for an abnormal ECG along with episodes of chest pain.  Pt had an ECHO 08/23/19 revealing EF 40-45%; basal mid and distal inferior wall hypokinesis. EKG as per MD note revealed inferior infarction.  Cardiac cath revealed multivessel CAD and he was referred to us for surgery.  On 9/6/19 s/p OPCABx3, EF 55%.  Surgery was uncomplicated and he arrived to the CTICU on no drips.  Extubated POD #0, briefly on Levophed for hypotension.  Also, responded to volume.  POD #2, beta blocker started, BP stable.  POD #3, transferred to [facility].  Pigtail placed for left pleural effusion (400cc).  Patient is ambulating and tolerating PO diet.      Neuro: Pain controlled.  Continue w/ Tylenol and Tramadol.  No focal deficits.  CV: HR 85, /52.  No room to titrate beta blocker at this time.    Pulm: CXR stable, left pigtail in place.  On room air.  Good O2 saturation  GI: PO diet, Protonix, stool softeners.  : Making good urine, creatinine stable.    Heme: H/H stable, on SC heparin  ID: No issues  Endo: Per Endocrine team decrease to Lantus 16, keep Lispro 7 TID.    Dispo: Home candidate, when medically ready.  Next day or so?

## 2019-09-09 NOTE — PROGRESS NOTE ADULT - SUBJECTIVE AND OBJECTIVE BOX
Patient discussed on morning rounds with Bart Chang    Operation / Date: OPCABx3 (LIMA-LAD, SVG-OM-PDA) EF 55%     SUBJECTIVE ASSESSMENT:  Patient states he feels okay today, but does not feel ready to go home "for another couple of days".  He had some breathing issues earlier, but feels better since his pigtail was placed.  He walked "3x today, long walks".  He is eating a "pretty good" PO diet, ate most of his lunch.  Has not had a BM yet, but is passing gas.  No abdominal pain or discomfort.  Pulling 500-1L on IS (witnessed).  He denies SOB, CP, incisional pain, N/V/D, dizziness, fever/chills.       Vital Signs Last 24 Hrs  T(C): 36.9 (09 Sep 2019 15:00), Max: 37.2 (08 Sep 2019 21:36)  T(F): 98.4 (09 Sep 2019 15:00), Max: 99 (08 Sep 2019 21:36)  HR: 85 (09 Sep 2019 14:00) (76 - 102)  BP: 106/62 (09 Sep 2019 14:00) (85/51 - 131/78)  BP(mean): 74 (09 Sep 2019 13:00) (59 - 97)  RR: 20 (09 Sep 2019 14:00) (15 - 34)  SpO2: 97% (09 Sep 2019 14:00) (96% - 98%)  I&O's Detail    08 Sep 2019 07:01  -  09 Sep 2019 07:00  --------------------------------------------------------  IN:    IV PiggyBack: 250 mL    Oral Fluid: 100 mL    sodium chloride 0.9%: 70 mL  Total IN: 420 mL    OUT:    Voided: 1700 mL  Total OUT: 1700 mL    Total NET: -1280 mL      09 Sep 2019 07:01  -  09 Sep 2019 17:27  --------------------------------------------------------  IN:    Albumin 5%  - 250 mL: 500 mL    Oral Fluid: 260 mL  Total IN: 760 mL    OUT:    Chest Tube: 400 mL    Voided: 1075 mL  Total OUT: 1475 mL    Total NET: -715 mL      CHEST TUBE:  No  TONE DRAIN:  No  EPICARDIAL WIRES: Yes  TIE DOWNS: Yes  BARRAZA: No    PHYSICAL EXAM:    GEN: NAD, looks comfortable.  Lying in bed.  Daughter present.   Psych: Mood appropriate  Neuro: A&Ox3.  No focal deficits.  Moving all extremities.   HEENT: No obvious abnormalities  CV: S1S2, regular, no murmurs appreciated.  No carotid bruits.  No JVD  Lungs: Clear B/L.  No wheezing, rales or rhonchi (Left posterior pigtail in place)  ABD: Soft, non-tender, non-distended.  +Bowel sounds  EXT: Warm and well perfused.  No peripheral edema noted  Musculoskeletal: Moving all extremities with normal ROM, no joint swelling  PV: Pedal pulses palpable  Incision Sites: MSI clean and dry.  EVH site clean and dry.  Open to air.     LABS:                        9.1    6.30  )-----------( 106      ( 09 Sep 2019 03:36 )             28.2       COUMADIN: No    PT/INR - ( 09 Sep 2019 03:36 )   PT: 14.0 sec;   INR: 1.23          PTT - ( 09 Sep 2019 03:36 )  PTT:35.9 sec    09-09    138  |  104  |  11  ----------------------------<  76  3.8   |  26  |  0.89    Ca    8.3<L>      09 Sep 2019 03:36  Phos  2.3     09-09  Mg     2.1     09-09    TPro  6.3  /  Alb  3.9  /  TBili  0.3  /  DBili  x   /  AST  32  /  ALT  8<L>  /  AlkPhos  38<L>  09-08        MEDICATIONS  (STANDING):  aspirin enteric coated 81 milliGRAM(s) Oral daily  atorvastatin 80 milliGRAM(s) Oral at bedtime  chlorhexidine 2% Cloths 1 Application(s) Topical <User Schedule>  clopidogrel Tablet 75 milliGRAM(s) Oral daily  dextrose 5%. 1000 milliLiter(s) (50 mL/Hr) IV Continuous <Continuous>  docusate sodium 100 milliGRAM(s) Oral three times a day  fluticasone propionate 50 MICROgram(s)/spray Nasal Spray 1 Spray(s) Both Nostrils two times a day  heparin  Injectable 5000 Unit(s) SubCutaneous every 8 hours  insulin glargine Injectable (LANTUS) 20 Unit(s) SubCutaneous at bedtime  insulin lispro (HumaLOG) corrective regimen sliding scale   SubCutaneous Before meals and at bedtime  insulin lispro Injectable (HumaLOG) 7 Unit(s) SubCutaneous three times a day before meals  metoprolol tartrate 12.5 milliGRAM(s) Oral every 12 hours  pantoprazole    Tablet 40 milliGRAM(s) Oral before breakfast  senna 2 Tablet(s) Oral at bedtime  sodium chloride 0.9% lock flush 3 milliLiter(s) IV Push every 8 hours    MEDICATIONS  (PRN):  acetaminophen   Tablet .. 650 milliGRAM(s) Oral every 6 hours PRN Temp greater or equal to 38C (100.4F), Mild Pain (1 - 3)  ALBUTerol    90 MICROgram(s) HFA Inhaler 2 Puff(s) Inhalation every 6 hours PRN Shortness of Breath  dextrose 40% Gel 15 Gram(s) Oral once PRN Blood Glucose LESS THAN 70 milliGRAM(s)/deciliter  glucagon  Injectable 1 milliGRAM(s) IntraMuscular once PRN Glucose LESS THAN 70 milligrams/deciliter  traMADol 25 milliGRAM(s) Oral every 6 hours PRN Moderate Pain (4 - 6)        RADIOLOGY & ADDITIONAL TESTS:  < from: Xray Chest 1 View- PORTABLE-Routine (09.09.19 @ 04:01) >  impression: Stable positioning of support device. Left basilar/pleural   effusion, decreased. Stable right pleural effusion. Stable heart size   status post median sternotomy. Stable bony structures.    < end of copied text >

## 2019-09-09 NOTE — DIETITIAN INITIAL EVALUATION ADULT. - CONTINUE CURRENT NUTRITION CARE PLAN
Future Appointments   Date Time Provider Danay Rodrigez   5/8/2019  2:00 PM Marbella Cabrera MD [facility]   5/18/2019 10:00 AM [facility]      Return in 3 months (on 5/9/2019) for follow up. yes

## 2019-09-09 NOTE — DIETITIAN INITIAL EVALUATION ADULT. - OTHER INFO
74yo M former smoker/drug use, EtOH use w/ PMH of HTN, DM, asthma w/ exertional CP and abnormal EKG (inferior infarction). Now s/p OPCABG x3 POD3. Pt seen in room, resting in bed. Currently on a DASH/TLC, CSTCHO and ovo-veg diet. Endorsing good appetite, consuming foods brought in by wife- had oatmeal and crushed nuts for breakfast this am. Is planning to have beans and brown rice w/ dinner. Reports adhering to an overall heart healthy diet at home and is planning to wean intake of eggs and dairy products. For protien, pt has soy, oats, nuts, beans/lentils. Provided pt w/ plant based menu to see inhouse selection. Noted A1C 7.3%. Denies GI distress, no N/V, passing flatus, has not had a post-op BM. No known unintentional wt changes PTA. Skin: surgical incision; GI WDL per flowsheet. RD to follow.

## 2019-09-09 NOTE — PROGRESS NOTE ADULT - SUBJECTIVE AND OBJECTIVE BOX
INTERVAL HPI/OVERNIGHT EVENTS:    Patient is a 73y old  Male who presents with a chief complaint of CAD (09 Sep 2019 17:27)      Pt reports the following symptoms:    CONSTITUTIONAL:  Negative fever or chills, feels well, good appetite  EYES:  Negative  blurry vision or double vision  CARDIOVASCULAR:  Negative for chest pain or palpitations  RESPIRATORY:  Negative for cough, wheezing, or SOB   GASTROINTESTINAL:  Negative for nausea, vomiting, diarrhea, constipation, or abdominal pain  GENITOURINARY:  Negative frequency, urgency or dysuria  NEUROLOGIC:  No headache, confusion, dizziness, lightheadedness    MEDICATIONS  (STANDING):  aspirin enteric coated 81 milliGRAM(s) Oral daily  atorvastatin 80 milliGRAM(s) Oral at bedtime  chlorhexidine 2% Cloths 1 Application(s) Topical <User Schedule>  clopidogrel Tablet 75 milliGRAM(s) Oral daily  dextrose 5%. 1000 milliLiter(s) (50 mL/Hr) IV Continuous <Continuous>  docusate sodium 100 milliGRAM(s) Oral three times a day  fluticasone propionate 50 MICROgram(s)/spray Nasal Spray 1 Spray(s) Both Nostrils two times a day  heparin  Injectable 5000 Unit(s) SubCutaneous every 8 hours  insulin glargine Injectable (LANTUS) 16 Unit(s) SubCutaneous at bedtime  insulin lispro (HumaLOG) corrective regimen sliding scale   SubCutaneous Before meals and at bedtime  insulin lispro Injectable (HumaLOG) 7 Unit(s) SubCutaneous three times a day before meals  metoprolol tartrate 12.5 milliGRAM(s) Oral every 12 hours  pantoprazole    Tablet 40 milliGRAM(s) Oral before breakfast  senna 2 Tablet(s) Oral at bedtime  sodium chloride 0.9% lock flush 3 milliLiter(s) IV Push every 8 hours    MEDICATIONS  (PRN):  acetaminophen   Tablet .. 650 milliGRAM(s) Oral every 6 hours PRN Temp greater or equal to 38C (100.4F), Mild Pain (1 - 3)  ALBUTerol    90 MICROgram(s) HFA Inhaler 2 Puff(s) Inhalation every 6 hours PRN Shortness of Breath  dextrose 40% Gel 15 Gram(s) Oral once PRN Blood Glucose LESS THAN 70 milliGRAM(s)/deciliter  glucagon  Injectable 1 milliGRAM(s) IntraMuscular once PRN Glucose LESS THAN 70 milligrams/deciliter  traMADol 25 milliGRAM(s) Oral every 6 hours PRN Moderate Pain (4 - 6)      PHYSICAL EXAM  Vital Signs Last 24 Hrs  T(C): 37.2 (09 Sep 2019 18:13), Max: 37.2 (08 Sep 2019 21:36)  T(F): 99 (09 Sep 2019 18:13), Max: 99 (08 Sep 2019 21:36)  HR: 85 (09 Sep 2019 14:00) (76 - 92)  BP: 106/62 (09 Sep 2019 14:00) (85/51 - 131/78)  BP(mean): 74 (09 Sep 2019 13:00) (59 - 97)  RR: 20 (09 Sep 2019 14:00) (15 - 34)  SpO2: 97% (09 Sep 2019 14:00) (96% - 98%)    Constitutional: wn/wd in NAD.   HEENT: NCAT, MMM, OP clear, EOMI, no proptosis or lid retraction  Neck: no thyromegaly or palpable thyroid nodules   Respiratory: lungs CTAB.  Cardiovascular: regular rhythm, normal S1 and S2, no audible murmurs, no peripheral edema  GI: soft, NT/ND, no masses/HSM appreciated.  Neurology: no tremors, DTR 2+  Skin: no visible rashes/lesions  Psychiatric: AAO x 3, normal affect/mood.    LABS:                        9.1    6.30  )-----------( 106      ( 09 Sep 2019 03:36 )             28.2     09-09    138  |  104  |  11  ----------------------------<  76  3.8   |  26  |  0.89    Ca    8.3<L>      09 Sep 2019 03:36  Phos  2.3     09-09  Mg     2.1     09-09    TPro  6.3  /  Alb  3.9  /  TBili  0.3  /  DBili  x   /  AST  32  /  ALT  8<L>  /  AlkPhos  38<L>  09-08    PT/INR - ( 09 Sep 2019 03:36 )   PT: 14.0 sec;   INR: 1.23          PTT - ( 09 Sep 2019 03:36 )  PTT:35.9 sec    Thyroid Stimulating Hormone, Serum: 0.780 uIU/mL (09-07 @ 03:28)      HbA1C: 7.3 % (09-04 @ 15:56)    CAPILLARY BLOOD GLUCOSE      POCT Blood Glucose.: 111 mg/dL (09 Sep 2019 16:26)  POCT Blood Glucose.: 113 mg/dL (09 Sep 2019 11:20)  POCT Blood Glucose.: 96 mg/dL (09 Sep 2019 06:26)  POCT Blood Glucose.: 266 mg/dL (08 Sep 2019 21:10)      Insulin Sliding Scale requirements X 24 Hours:    RADIOLOGY & ADDITIONAL TESTS:    A/P: 73y Male with history of DM type II presenting for       1.  DM -     Please continue           units lantus at bedtime  / in the morning and        units lispro with meals and lispro moderate / low dose sliding scale 4 times daily with meals and at bedtime.  Please continue consistent carbohydrate diet.      Goal FSG is   Will continue to monitor   For discharge, pt can continue    Pt can follow up at discharge with [facility] by calling  to make an appointment.   Will discuss case with     and update primary team INTERVAL HPI/OVERNIGHT EVENTS:    Patient seen and examined at the bedside. no acute events overnight. He has been getting outside food brought by his wife and eating but he has been complaint with this diet.     FSG & Insulin received:  Yesterday:  pre-dinner fs, 7 nutritional lispro   units, dont remember  bedtime fs, 20 lantus   units +  6  units lispro SS    Today:  pre-breakfast fs, 7 nutritional lispro   units, had oatmeal and crushed nuts  pre-lunch fs, 7 nutritional lispro   units, had potatoes, string beans      Pt reports the following symptoms:    CONSTITUTIONAL:  Negative fever or chills, feels well, good appetite  EYES:  Negative  blurry vision or double vision  CARDIOVASCULAR:  Negative for chest pain or palpitations  RESPIRATORY:  Negative for cough, wheezing, or SOB   GASTROINTESTINAL:  Negative for nausea, vomiting, diarrhea, constipation, or abdominal pain  GENITOURINARY:  Negative frequency, urgency or dysuria  NEUROLOGIC:  No headache, confusion, dizziness, lightheadedness    MEDICATIONS  (STANDING):  aspirin enteric coated 81 milliGRAM(s) Oral daily  atorvastatin 80 milliGRAM(s) Oral at bedtime  chlorhexidine 2% Cloths 1 Application(s) Topical <User Schedule>  clopidogrel Tablet 75 milliGRAM(s) Oral daily  dextrose 5%. 1000 milliLiter(s) (50 mL/Hr) IV Continuous <Continuous>  docusate sodium 100 milliGRAM(s) Oral three times a day  fluticasone propionate 50 MICROgram(s)/spray Nasal Spray 1 Spray(s) Both Nostrils two times a day  heparin  Injectable 5000 Unit(s) SubCutaneous every 8 hours  insulin glargine Injectable (LANTUS) 16 Unit(s) SubCutaneous at bedtime  insulin lispro (HumaLOG) corrective regimen sliding scale   SubCutaneous Before meals and at bedtime  insulin lispro Injectable (HumaLOG) 7 Unit(s) SubCutaneous three times a day before meals  metoprolol tartrate 12.5 milliGRAM(s) Oral every 12 hours  pantoprazole    Tablet 40 milliGRAM(s) Oral before breakfast  senna 2 Tablet(s) Oral at bedtime  sodium chloride 0.9% lock flush 3 milliLiter(s) IV Push every 8 hours    MEDICATIONS  (PRN):  acetaminophen   Tablet .. 650 milliGRAM(s) Oral every 6 hours PRN Temp greater or equal to 38C (100.4F), Mild Pain (1 - 3)  ALBUTerol    90 MICROgram(s) HFA Inhaler 2 Puff(s) Inhalation every 6 hours PRN Shortness of Breath  dextrose 40% Gel 15 Gram(s) Oral once PRN Blood Glucose LESS THAN 70 milliGRAM(s)/deciliter  glucagon  Injectable 1 milliGRAM(s) IntraMuscular once PRN Glucose LESS THAN 70 milligrams/deciliter  traMADol 25 milliGRAM(s) Oral every 6 hours PRN Moderate Pain (4 - 6)      PHYSICAL EXAM  Vital Signs Last 24 Hrs  T(C): 37.2 (09 Sep 2019 18:13), Max: 37.2 (08 Sep 2019 21:36)  T(F): 99 (09 Sep 2019 18:13), Max: 99 (08 Sep 2019 21:36)  HR: 85 (09 Sep 2019 14:00) (76 - 92)  BP: 106/62 (09 Sep 2019 14:00) (85/51 - 131/78)  BP(mean): 74 (09 Sep 2019 13:00) (59 - 97)  RR: 20 (09 Sep 2019 14:00) (15 - 34)  SpO2: 97% (09 Sep 2019 14:00) (96% - 98%)    Constitutional: wn/wd in NAD.   Respiratory: lungs CTAB.  Cardiovascular: regular rhythm, normal S1 and S2, no peripheral edema  GI: soft, NT/ND, no masses/HSM appreciated.  Neurology: no tremors, no focal neurological deficits      LABS:                        9.1    6.30  )-----------( 106      ( 09 Sep 2019 03:36 )             28.2         138  |  104  |  11  ----------------------------<  76  3.8   |  26  |  0.89    Ca    8.3<L>      09 Sep 2019 03:36  Phos  2.3       Mg     2.1         TPro  6.3  /  Alb  3.9  /  TBili  0.3  /  DBili  x   /  AST  32  /  ALT  8<L>  /  AlkPhos  38<L>      PT/INR - ( 09 Sep 2019 03:36 )   PT: 14.0 sec;   INR: 1.23          PTT - ( 09 Sep 2019 03:36 )  PTT:35.9 sec    Thyroid Stimulating Hormone, Serum: 0.780 uIU/mL ( @ 03:28)      HbA1C: 7.3 % ( @ 15:56)    CAPILLARY BLOOD GLUCOSE      POCT Blood Glucose.: 111 mg/dL (09 Sep 2019 16:26)  POCT Blood Glucose.: 113 mg/dL (09 Sep 2019 11:20)  POCT Blood Glucose.: 96 mg/dL (09 Sep 2019 06:26)  POCT Blood Glucose.: 266 mg/dL (08 Sep 2019 21:10)      Insulin Sliding Scale requirements X 24 Hours:    RADIOLOGY & ADDITIONAL TESTS:    A/P:73yMale with hx of DM presenting for management of CAD now now s/p OPCAB     1.  DM: type 2, controlled, complicated.   Hba1c 7.3  Cr/GFR 0.89/98  EF 50%  Please decrease to 16 lantus at bedtime  Please continue lispro 7 units TID with meals.   Continue lispro moderate dose scale with meals and at bedtime.   Continue consistent carb diet  FSG Goal 100-180    2.  Hyperlipidemia - goal LDL < 70  continue statin    Goal FSG is 120-150  Will continue to monitor   For discharge, pt can be discharged on oral meds with metformin and januvia - dose TBD    Pt can follow up at discharge with [facility] by calling  to make an appointment.   Discussed case with     and updated primary team    REMINDERS FOR INSULIN/DIABETES SUPPLIES at DISCHARGE:  INSULIN:   Long actin/Basal Insulin: Examples: Toujeo, Basaglar, Tresiba, Lantus   Short acting/Bolus Insulin: Humalog, Admelog, Novolog  Please ensure that BOTH short acting and long acting insulin are prescribed in the same preparation (Ex: PEN vs VIAL/SOLUTION)     TESTING SUPPLIES:   All glucometer supplies should be written as generic to avoid issues with insurance. Use the free text option in [facility] prescription writer, and type in glucometer test strips, lancets, etc to order.    If sending patient home on insulin PEN, please send:   •	BD anna insulin pen needles for use up to 4 times daily (total quantity 100)  •	Lancets for use up to 4 times daily (total quantity 100)  •	Glucometer Test strips for use up to 4 times daily (total quantity 100)  •	Alcohol swabs for use up to 4 times daily (total quantity 100)  •	Glucometer (If provided by hospital, still provide scripts for lancets, test strips, and swabs)  If sending patient home on insulin VIAL, please send:   •	Insulin syringes (6mm) - for use up to 4 times daily (total quantity 100)  •	Lancets for use up to 4 times daily (total quantity 100)  •	Generic Glucometer Test strips for use up to 4 times daily (total quantity 100)  •	Alcohol swabs for use up to 4 times daily (total quantity 100)  •	Generic Glucometer (If provided by hospital, still provide scripts for lancets, test strips, and swabs)  •	Do not specify brand for testing supplies (such as contour, freestyle, one touch etc) that way the pharmacy has the freedom to pick and change according to what the insurance dictates.  For patients without insurance:   •	Provide social work with appropriate scripts so they may obtain 1 week of samples  •	Provide with glucometer. Glucometers are located at various nursing stations, the nursing office, education, and endocrine fellows office.  •	Please make appointment with Rena Joshua NP or Cora Brock RN and MIGEL Salcedo at the [address] [place] [address] endocrinology clinic. They can see patients without insurance, provide appropriate samples, and assist in getting insurance coverage.     PREFERRED PHARMACY:  [facility] (located on 1st floor next to admitting)  P: [phone]  Hours: M – F 8AM – 8PM, Sat 8AM – 4PM, Sun—closed  If not using [facility], please follow up with chosen pharmacy to ensure insulin prescribed is covered.

## 2019-09-09 NOTE — PROGRESS NOTE ADULT - SUBJECTIVE AND OBJECTIVE BOX
CTICU  CRITICAL  CARE  attending     Hand off received 					   Pertinent clinical, laboratory, radiographic, hemodynamic, echocardiographic, respiratory data, microbiologic data and chart were reviewed and analyzed frequently throughout the course of the day and night  Patient seen and examined with CTS/ SH attending at bedside  Pt is a 73y , Male, HEALTH ISSUES - PROBLEM Dx:  Nutrition, metabolism, and development symptoms: Nutrition, metabolism, and development symptoms  Asthma: Asthma  Diabetes: Diabetes  HTN (hypertension): HTN (hypertension)  CAD (coronary artery disease): CAD (coronary artery disease)      , FAMILY HISTORY:  FH: myocardial infarction: mother: age 80s  PAST MEDICAL & SURGICAL HISTORY:  S/P cataract surgery  H/O shoulder surgery: B/L  H/O total knee replacement, right    Patient is a 73y old  Male who presents with a chief complaint of s/p CABG (09 Sep 2019 18:18)      14 system review was unremarkable    Vital signs, hemodynamic and respiratory parameters were reviewed from the bedside nursing flowsheet.  ICU Vital Signs Last 24 Hrs  T(C): 37.5 (09 Sep 2019 22:12), Max: 37.5 (09 Sep 2019 22:12)  T(F): 99.5 (09 Sep 2019 22:12), Max: 99.5 (09 Sep 2019 22:12)  HR: 92 (09 Sep 2019 21:05) (76 - 92)  BP: 121/68 (09 Sep 2019 21:05) (85/51 - 131/78)  BP(mean): 97 (09 Sep 2019 21:05) (59 - 97)  ABP: --  ABP(mean): --  RR: 16 (09 Sep 2019 21:05) (15 - 25)  SpO2: 93% (09 Sep 2019 21:05) (93% - 98%)    Adult Advanced Hemodynamics Last 24 Hrs  CVP(mm Hg): --  CVP(cm H2O): --  CO: --  CI: --  PA: --  PA(mean): --  PCWP: --  SVR: --  SVRI: --  PVR: --  PVRI: --, ABG - ( 08 Sep 2019 11:58 )  pH, Arterial: 7.50  pH, Blood: x     /  pCO2: 30    /  pO2: 79    / HCO3: 23    / Base Excess: 0.2   /  SaO2: 96                  Intake and output was reviewed and the fluid balance was calculated  Daily     Daily Weight in k.5 (09 Sep 2019 10:22)  I&O's Summary    08 Sep 2019 07:  -  09 Sep 2019 07:00  --------------------------------------------------------  IN: 420 mL / OUT: 1700 mL / NET: -1280 mL    09 Sep 2019 07:  -  09 Sep 2019 22:56  --------------------------------------------------------  IN: 760 mL / OUT: 1505 mL / NET: -745 mL        All lines and drain sites were assessed  Glycemic trend was reviewedCAPILLARY BLOOD GLUCOSE      POCT Blood Glucose.: 87 mg/dL (09 Sep 2019 21:04)    No acute change in mental status  Auscultation of the chest reveals equal bs  Abdomen is soft  Extremities are warm and well perfused  Wounds appear clean and unremarkable  Antibiotics are periop    labs  CBC Full  -  ( 09 Sep 2019 03:36 )  WBC Count : 6.30 K/uL  RBC Count : 2.86 M/uL  Hemoglobin : 9.1 g/dL  Hematocrit : 28.2 %  Platelet Count - Automated : 106 K/uL  Mean Cell Volume : 98.6 fl  Mean Cell Hemoglobin : 31.8 pg  Mean Cell Hemoglobin Concentration : 32.3 gm/dL  Auto Neutrophil # : x  Auto Lymphocyte # : x  Auto Monocyte # : x  Auto Eosinophil # : x  Auto Basophil # : x  Auto Neutrophil % : x  Auto Lymphocyte % : x  Auto Monocyte % : x  Auto Eosinophil % : x  Auto Basophil % : x        138  |  104  |  11  ----------------------------<  76  3.8   |  26  |  0.89    Ca    8.3<L>      09 Sep 2019 03:36  Phos  2.3       Mg     2.1         TPro  6.3  /  Alb  3.9  /  TBili  0.3  /  DBili  x   /  AST  32  /  ALT  8<L>  /  AlkPhos  38<L>      PT/INR - ( 09 Sep 2019 03:36 )   PT: 14.0 sec;   INR: 1.23          PTT - ( 09 Sep 2019 03:36 )  PTT:35.9 sec  The current medications were reviewed   MEDICATIONS  (STANDING):  aspirin enteric coated 81 milliGRAM(s) Oral daily  atorvastatin 80 milliGRAM(s) Oral at bedtime  chlorhexidine 2% Cloths 1 Application(s) Topical <User Schedule>  clopidogrel Tablet 75 milliGRAM(s) Oral daily  dextrose 5%. 1000 milliLiter(s) (50 mL/Hr) IV Continuous <Continuous>  docusate sodium 100 milliGRAM(s) Oral three times a day  fluticasone propionate 50 MICROgram(s)/spray Nasal Spray 1 Spray(s) Both Nostrils two times a day  heparin  Injectable 5000 Unit(s) SubCutaneous every 8 hours  insulin glargine Injectable (LANTUS) 16 Unit(s) SubCutaneous at bedtime  insulin lispro (HumaLOG) corrective regimen sliding scale   SubCutaneous Before meals and at bedtime  insulin lispro Injectable (HumaLOG) 7 Unit(s) SubCutaneous three times a day before meals  metoprolol tartrate 12.5 milliGRAM(s) Oral every 12 hours  pantoprazole    Tablet 40 milliGRAM(s) Oral before breakfast  senna 2 Tablet(s) Oral at bedtime  sodium chloride 0.9% lock flush 3 milliLiter(s) IV Push every 8 hours    MEDICATIONS  (PRN):  acetaminophen   Tablet .. 650 milliGRAM(s) Oral every 6 hours PRN Temp greater or equal to 38C (100.4F), Mild Pain (1 - 3)  ALBUTerol    90 MICROgram(s) HFA Inhaler 2 Puff(s) Inhalation every 6 hours PRN Shortness of Breath  dextrose 40% Gel 15 Gram(s) Oral once PRN Blood Glucose LESS THAN 70 milliGRAM(s)/deciliter  glucagon  Injectable 1 milliGRAM(s) IntraMuscular once PRN Glucose LESS THAN 70 milligrams/deciliter  traMADol 25 milliGRAM(s) Oral every 6 hours PRN Moderate Pain (4 - 6)       PROBLEM LIST/ ASSESSMENT:  HEALTH ISSUES - PROBLEM Dx:  Nutrition, metabolism, and development symptoms: Nutrition, metabolism, and development symptoms  Asthma: Asthma  Diabetes: Diabetes  HTN (hypertension): HTN (hypertension)  CAD (coronary artery disease): CAD (coronary artery disease)      ,   Patient is a 73y old  Male who presents with a chief complaint of s/p CABG (09 Sep 2019 18:18)     s/p cardiac surgery          My plan includes :  close hemodynamic, ventilatory and drain monitoring and management per post op routine    Monitor for arrhythmias and monitor parameters for organ perfusion  beta blockade not administered due to hemodynamic instability and bradycardia  monitor neurologic status  Head of the bed should remain elevated to 45 deg .   chest PT and IS will be encouraged  monitor adequacy of oxygenation and ventilation and attempt to wean oxygen  antibiotic regimen will be tailored to the clinical, laboratory and microbiologic data  Nutritional goals will be met using po eventually , ensure adequate caloric intake and montior the same  Stress ulcer and VTE prophylaxis will be achieved    Glycemic control is satisfactory  Electrolytes have been repleted as necessary and wound care has been carried out. Pain control has been achieved.   agressive physical therapy and early mobility and ambulation goals will be met   The family was updated about the course and plan  CRITICAL CARE TIME personally provided by me  in evaluation and management, reassessments, review and interpretation of labs and x-rays, ventilator and hemodynamic management, formulating a plan and coordinating care: ___90____ MIN.  Time does not include procedural time.  CTICU ATTENDING     					    Junaid Killian MD

## 2019-09-09 NOTE — PROCEDURE NOTE - NSPOSTCAREGUIDE_GEN_A_CORE
Verbal/written post procedure instructions were given to patient/caregiver/Instructed patient/caregiver to follow-up with primary care physician/Keep the cast/splint/dressing clean and dry/Instructed patient/caregiver regarding signs and symptoms of infection/Care for catheter as per unit/ICU protocols

## 2019-09-09 NOTE — DIETITIAN INITIAL EVALUATION ADULT. - ENERGY NEEDS
Height: 5'6" Weight: 160lbs, IBW 142lbs+/-10%, %%, BMI 25.9  IBW used for calculations as pt >120% of IBW   Nutrient needs based on [facility] standards of care for maintenance in older adults.   Needs adjusted for age and post-op healing

## 2019-09-09 NOTE — PROCEDURE NOTE - NSPROCDETAILS_GEN_ALL_CORE
ultrasound assessment of fluid (size)/secured in place/thoracostomy tube placed percutaneously/ultrasound assessment of fluid (location)/dressing applied/percutaneous/Seldinger technique/sterile dressing applied

## 2019-09-10 LAB
ANION GAP SERPL CALC-SCNC: 11 MMOL/L — SIGNIFICANT CHANGE UP (ref 5–17)
BUN SERPL-MCNC: 10 MG/DL — SIGNIFICANT CHANGE UP (ref 7–23)
CALCIUM SERPL-MCNC: 8.4 MG/DL — SIGNIFICANT CHANGE UP (ref 8.4–10.5)
CHLORIDE SERPL-SCNC: 106 MMOL/L — SIGNIFICANT CHANGE UP (ref 96–108)
CO2 SERPL-SCNC: 24 MMOL/L — SIGNIFICANT CHANGE UP (ref 22–31)
CREAT SERPL-MCNC: 0.93 MG/DL — SIGNIFICANT CHANGE UP (ref 0.5–1.3)
GLUCOSE BLDC GLUCOMTR-MCNC: 110 MG/DL — HIGH (ref 70–99)
GLUCOSE BLDC GLUCOMTR-MCNC: 124 MG/DL — HIGH (ref 70–99)
GLUCOSE BLDC GLUCOMTR-MCNC: 129 MG/DL — HIGH (ref 70–99)
GLUCOSE BLDC GLUCOMTR-MCNC: 170 MG/DL — HIGH (ref 70–99)
GLUCOSE SERPL-MCNC: 99 MG/DL — SIGNIFICANT CHANGE UP (ref 70–99)
HCT VFR BLD CALC: 25.2 % — LOW (ref 39–50)
HGB BLD-MCNC: 7.9 G/DL — LOW (ref 13–17)
MAGNESIUM SERPL-MCNC: 2.2 MG/DL — SIGNIFICANT CHANGE UP (ref 1.6–2.6)
MCHC RBC-ENTMCNC: 31.3 GM/DL — LOW (ref 32–36)
MCHC RBC-ENTMCNC: 31.7 PG — SIGNIFICANT CHANGE UP (ref 27–34)
MCV RBC AUTO: 101.2 FL — HIGH (ref 80–100)
NRBC # BLD: 0 /100 WBCS — SIGNIFICANT CHANGE UP (ref 0–0)
PLATELET # BLD AUTO: 144 K/UL — LOW (ref 150–400)
POTASSIUM SERPL-MCNC: 4.2 MMOL/L — SIGNIFICANT CHANGE UP (ref 3.5–5.3)
POTASSIUM SERPL-SCNC: 4.2 MMOL/L — SIGNIFICANT CHANGE UP (ref 3.5–5.3)
RBC # BLD: 2.49 M/UL — LOW (ref 4.2–5.8)
RBC # FLD: 13.4 % — SIGNIFICANT CHANGE UP (ref 10.3–14.5)
SODIUM SERPL-SCNC: 141 MMOL/L — SIGNIFICANT CHANGE UP (ref 135–145)
WBC # BLD: 6.32 K/UL — SIGNIFICANT CHANGE UP (ref 3.8–10.5)
WBC # FLD AUTO: 6.32 K/UL — SIGNIFICANT CHANGE UP (ref 3.8–10.5)

## 2019-09-10 PROCEDURE — 99232 SBSQ HOSP IP/OBS MODERATE 35: CPT | Mod: GC

## 2019-09-10 PROCEDURE — 71046 X-RAY EXAM CHEST 2 VIEWS: CPT | Mod: 26

## 2019-09-10 RX ORDER — INSULIN GLARGINE 100 [IU]/ML
17 INJECTION, SOLUTION SUBCUTANEOUS AT BEDTIME
Refills: 0 | Status: DISCONTINUED | OUTPATIENT
Start: 2019-09-10 | End: 2019-09-11

## 2019-09-10 RX ADMIN — INSULIN GLARGINE 17 UNIT(S): 100 INJECTION, SOLUTION SUBCUTANEOUS at 22:25

## 2019-09-10 RX ADMIN — SODIUM CHLORIDE 3 MILLILITER(S): 9 INJECTION INTRAMUSCULAR; INTRAVENOUS; SUBCUTANEOUS at 22:26

## 2019-09-10 RX ADMIN — TRAMADOL HYDROCHLORIDE 25 MILLIGRAM(S): 50 TABLET ORAL at 20:33

## 2019-09-10 RX ADMIN — CLOPIDOGREL BISULFATE 75 MILLIGRAM(S): 75 TABLET, FILM COATED ORAL at 12:07

## 2019-09-10 RX ADMIN — PANTOPRAZOLE SODIUM 40 MILLIGRAM(S): 20 TABLET, DELAYED RELEASE ORAL at 06:53

## 2019-09-10 RX ADMIN — HEPARIN SODIUM 5000 UNIT(S): 5000 INJECTION INTRAVENOUS; SUBCUTANEOUS at 12:07

## 2019-09-10 RX ADMIN — Medication 81 MILLIGRAM(S): at 12:07

## 2019-09-10 RX ADMIN — Medication 1 SPRAY(S): at 17:24

## 2019-09-10 RX ADMIN — Medication 1 SPRAY(S): at 06:54

## 2019-09-10 RX ADMIN — ATORVASTATIN CALCIUM 80 MILLIGRAM(S): 80 TABLET, FILM COATED ORAL at 22:25

## 2019-09-10 RX ADMIN — Medication 12.5 MILLIGRAM(S): at 06:53

## 2019-09-10 RX ADMIN — SODIUM CHLORIDE 3 MILLILITER(S): 9 INJECTION INTRAMUSCULAR; INTRAVENOUS; SUBCUTANEOUS at 14:53

## 2019-09-10 RX ADMIN — TRAMADOL HYDROCHLORIDE 25 MILLIGRAM(S): 50 TABLET ORAL at 06:53

## 2019-09-10 RX ADMIN — SODIUM CHLORIDE 3 MILLILITER(S): 9 INJECTION INTRAMUSCULAR; INTRAVENOUS; SUBCUTANEOUS at 06:54

## 2019-09-10 RX ADMIN — CHLORHEXIDINE GLUCONATE 1 APPLICATION(S): 213 SOLUTION TOPICAL at 06:54

## 2019-09-10 RX ADMIN — Medication 7 UNIT(S): at 12:11

## 2019-09-10 RX ADMIN — Medication 7 UNIT(S): at 07:37

## 2019-09-10 RX ADMIN — TRAMADOL HYDROCHLORIDE 25 MILLIGRAM(S): 50 TABLET ORAL at 21:10

## 2019-09-10 RX ADMIN — TRAMADOL HYDROCHLORIDE 25 MILLIGRAM(S): 50 TABLET ORAL at 07:23

## 2019-09-10 RX ADMIN — Medication 100 MILLIGRAM(S): at 06:53

## 2019-09-10 RX ADMIN — Medication 7 UNIT(S): at 17:25

## 2019-09-10 RX ADMIN — Medication 12.5 MILLIGRAM(S): at 17:25

## 2019-09-10 RX ADMIN — Medication 2: at 17:25

## 2019-09-10 NOTE — PROGRESS NOTE ADULT - SUBJECTIVE AND OBJECTIVE BOX
INTERVAL HPI/OVERNIGHT EVENTS:    Patient seen and examined at the bedside. no acute events overnight. He has been getting outside food brought by his wife and eating but he has been complaint with this diet.     FSG & Insulin received:  9/9:  Dinner . Lispro 7. Ate rice, beans, and salad.  Bedtime FSG 87. Lantus 16.  9/10:  Breakfast . Lispro 7. Didn't eat anything because he doesn't like hospital food.  Lunch . Lispro 7. Wife brought quinoa pasta and vegetables.      Pt reports the following symptoms:    CONSTITUTIONAL:  Negative fever or chills, feels well, good appetite  EYES:  Negative  blurry vision or double vision  CARDIOVASCULAR:  Negative for chest pain or palpitations  RESPIRATORY:  Negative for cough, wheezing, or SOB   GASTROINTESTINAL:  Negative for nausea, vomiting, diarrhea, constipation, or abdominal pain  GENITOURINARY:  Negative frequency, urgency or dysuria  NEUROLOGIC:  No headache, confusion, dizziness, lightheadedness    MEDICATIONS  (STANDING):  aspirin enteric coated 81 milliGRAM(s) Oral daily  atorvastatin 80 milliGRAM(s) Oral at bedtime  chlorhexidine 2% Cloths 1 Application(s) Topical <User Schedule>  clopidogrel Tablet 75 milliGRAM(s) Oral daily  dextrose 5%. 1000 milliLiter(s) (50 mL/Hr) IV Continuous <Continuous>  docusate sodium 100 milliGRAM(s) Oral three times a day  fluticasone propionate 50 MICROgram(s)/spray Nasal Spray 1 Spray(s) Both Nostrils two times a day  heparin  Injectable 5000 Unit(s) SubCutaneous every 8 hours  insulin glargine Injectable (LANTUS) 16 Unit(s) SubCutaneous at bedtime  insulin lispro (HumaLOG) corrective regimen sliding scale   SubCutaneous Before meals and at bedtime  insulin lispro Injectable (HumaLOG) 7 Unit(s) SubCutaneous three times a day before meals  metoprolol tartrate 12.5 milliGRAM(s) Oral every 12 hours  pantoprazole    Tablet 40 milliGRAM(s) Oral before breakfast  senna 2 Tablet(s) Oral at bedtime  sodium chloride 0.9% lock flush 3 milliLiter(s) IV Push every 8 hours    MEDICATIONS  (PRN):  acetaminophen   Tablet .. 650 milliGRAM(s) Oral every 6 hours PRN Temp greater or equal to 38C (100.4F), Mild Pain (1 - 3)  ALBUTerol    90 MICROgram(s) HFA Inhaler 2 Puff(s) Inhalation every 6 hours PRN Shortness of Breath  dextrose 40% Gel 15 Gram(s) Oral once PRN Blood Glucose LESS THAN 70 milliGRAM(s)/deciliter  glucagon  Injectable 1 milliGRAM(s) IntraMuscular once PRN Glucose LESS THAN 70 milligrams/deciliter  traMADol 25 milliGRAM(s) Oral every 6 hours PRN Moderate Pain (4 - 6)      PHYSICAL EXAM  Vital Signs Last 24 Hrs  T(C): 36.6 (10 Sep 2019 09:00), Max: 37.5 (09 Sep 2019 22:12)  T(F): 97.9 (10 Sep 2019 09:00), Max: 99.5 (09 Sep 2019 22:12)  HR: 86 (10 Sep 2019 09:16) (76 - 92)  BP: 118/72 (10 Sep 2019 09:16) (85/51 - 121/68)  BP(mean): 91 (10 Sep 2019 09:16) (59 - 97)  RR: 17 (10 Sep 2019 09:16) (16 - 24)  SpO2: 98% (10 Sep 2019 09:16) (93% - 98%)    Constitutional: wn/wd in NAD.   HEENT: NCAT, MMM, OP clear, EOMI, no proptosis or lid retraction  Neck: no thyromegaly or palpable thyroid nodules   Respiratory: lungs CTAB.  Cardiovascular: regular rhythm, normal S1 and S2, no audible murmurs, no peripheral edema  GI: soft, NT/ND, no masses/HSM appreciated.  Neurology: no tremors, DTR 2+  Skin: no visible rashes/lesions  Psychiatric: AAO x 3, normal affect/mood.    LABS:                        7.9    6.32  )-----------( 144      ( 10 Sep 2019 05:50 )             25.2     09-10    141  |  106  |  10  ----------------------------<  99  4.2   |  24  |  0.93    Ca    8.4      10 Sep 2019 05:50  Phos  2.3     09-09  Mg     2.2     09-10    TPro  6.3  /  Alb  3.9  /  TBili  0.3  /  DBili  x   /  AST  32  /  ALT  8<L>  /  AlkPhos  38<L>  09-08    PT/INR - ( 09 Sep 2019 03:36 )   PT: 14.0 sec;   INR: 1.23          PTT - ( 09 Sep 2019 03:36 )  PTT:35.9 sec    Thyroid Stimulating Hormone, Serum: 0.780 uIU/mL (09-07 @ 03:28)      HbA1C: 7.3 % (09-04 @ 15:56)    CAPILLARY BLOOD GLUCOSE      POCT Blood Glucose.: 129 mg/dL (10 Sep 2019 11:29)  POCT Blood Glucose.: 124 mg/dL (10 Sep 2019 07:14)  POCT Blood Glucose.: 87 mg/dL (09 Sep 2019 21:04)  POCT Blood Glucose.: 111 mg/dL (09 Sep 2019 16:26)    Will Discuss in Rounds  A/P:73yMale with hx of DM presenting for management of CAD now now s/p OPCAB     1.  DM: type 2, controlled, complicated.   Hba1c 7.3  Cr/GFR 0.89/98  EF 50%  Please decrease to 16 lantus at bedtime  Please continue lispro 7 units TID with meals.   Continue lispro moderate dose scale with meals and at bedtime.   Continue consistent carb diet  FSG Goal 100-180    2.  Hyperlipidemia - goal LDL < 70  continue statin    Goal FSG is 120-150  Will continue to monitor   For discharge, pt can be discharged on oral meds with metformin and januvia - dose TBD    Pt can follow up at discharge with [facility] by calling  to make an appointment.   Discussed case with     and updated primary team INTERVAL HPI/OVERNIGHT EVENTS:    Patient seen and examined at the bedside. no acute events overnight. He has been getting outside food brought by his wife and eating but he has been complaint with this diet.     FSG & Insulin received:  9/9:  Dinner . Lispro 7. Ate rice, beans, and salad.  Bedtime FSG 87. Lantus 16.  9/10:  Breakfast . Lispro 7. Didn't eat anything because he doesn't like hospital food.  Lunch . Lispro 7. Wife brought quinoa pasta and vegetables.      Pt reports the following symptoms:    CONSTITUTIONAL:  Negative fever or chills, feels well, good appetite  EYES:  Negative  blurry vision or double vision  CARDIOVASCULAR:  Negative for chest pain or palpitations  RESPIRATORY:  Negative for cough, wheezing, or SOB   GASTROINTESTINAL:  Negative for nausea, vomiting, diarrhea, constipation, or abdominal pain  GENITOURINARY:  Negative frequency, urgency or dysuria  NEUROLOGIC:  No headache, confusion, dizziness, lightheadedness    MEDICATIONS  (STANDING):  aspirin enteric coated 81 milliGRAM(s) Oral daily  atorvastatin 80 milliGRAM(s) Oral at bedtime  chlorhexidine 2% Cloths 1 Application(s) Topical <User Schedule>  clopidogrel Tablet 75 milliGRAM(s) Oral daily  dextrose 5%. 1000 milliLiter(s) (50 mL/Hr) IV Continuous <Continuous>  docusate sodium 100 milliGRAM(s) Oral three times a day  fluticasone propionate 50 MICROgram(s)/spray Nasal Spray 1 Spray(s) Both Nostrils two times a day  heparin  Injectable 5000 Unit(s) SubCutaneous every 8 hours  insulin glargine Injectable (LANTUS) 16 Unit(s) SubCutaneous at bedtime  insulin lispro (HumaLOG) corrective regimen sliding scale   SubCutaneous Before meals and at bedtime  insulin lispro Injectable (HumaLOG) 7 Unit(s) SubCutaneous three times a day before meals  metoprolol tartrate 12.5 milliGRAM(s) Oral every 12 hours  pantoprazole    Tablet 40 milliGRAM(s) Oral before breakfast  senna 2 Tablet(s) Oral at bedtime  sodium chloride 0.9% lock flush 3 milliLiter(s) IV Push every 8 hours    MEDICATIONS  (PRN):  acetaminophen   Tablet .. 650 milliGRAM(s) Oral every 6 hours PRN Temp greater or equal to 38C (100.4F), Mild Pain (1 - 3)  ALBUTerol    90 MICROgram(s) HFA Inhaler 2 Puff(s) Inhalation every 6 hours PRN Shortness of Breath  dextrose 40% Gel 15 Gram(s) Oral once PRN Blood Glucose LESS THAN 70 milliGRAM(s)/deciliter  glucagon  Injectable 1 milliGRAM(s) IntraMuscular once PRN Glucose LESS THAN 70 milligrams/deciliter  traMADol 25 milliGRAM(s) Oral every 6 hours PRN Moderate Pain (4 - 6)      PHYSICAL EXAM  Vital Signs Last 24 Hrs  T(C): 36.6 (10 Sep 2019 09:00), Max: 37.5 (09 Sep 2019 22:12)  T(F): 97.9 (10 Sep 2019 09:00), Max: 99.5 (09 Sep 2019 22:12)  HR: 86 (10 Sep 2019 09:16) (76 - 92)  BP: 118/72 (10 Sep 2019 09:16) (85/51 - 121/68)  BP(mean): 91 (10 Sep 2019 09:16) (59 - 97)  RR: 17 (10 Sep 2019 09:16) (16 - 24)  SpO2: 98% (10 Sep 2019 09:16) (93% - 98%)    Constitutional: wn/wd in NAD.   HEENT: NCAT, MMM, OP clear, EOMI, no proptosis or lid retraction  Neck: no thyromegaly or palpable thyroid nodules   Respiratory: lungs CTAB.  Cardiovascular: regular rhythm, normal S1 and S2, no audible murmurs, no peripheral edema  GI: soft, NT/ND, no masses/HSM appreciated.  Neurology: no tremors, DTR 2+  Skin: no visible rashes/lesions  Psychiatric: AAO x 3, normal affect/mood.    LABS:                        7.9    6.32  )-----------( 144      ( 10 Sep 2019 05:50 )             25.2     09-10    141  |  106  |  10  ----------------------------<  99  4.2   |  24  |  0.93    Ca    8.4      10 Sep 2019 05:50  Phos  2.3     09-09  Mg     2.2     09-10    TPro  6.3  /  Alb  3.9  /  TBili  0.3  /  DBili  x   /  AST  32  /  ALT  8<L>  /  AlkPhos  38<L>  09-08    PT/INR - ( 09 Sep 2019 03:36 )   PT: 14.0 sec;   INR: 1.23          PTT - ( 09 Sep 2019 03:36 )  PTT:35.9 sec    Thyroid Stimulating Hormone, Serum: 0.780 uIU/mL (09-07 @ 03:28)      HbA1C: 7.3 % (09-04 @ 15:56)    CAPILLARY BLOOD GLUCOSE      POCT Blood Glucose.: 129 mg/dL (10 Sep 2019 11:29)  POCT Blood Glucose.: 124 mg/dL (10 Sep 2019 07:14)  POCT Blood Glucose.: 87 mg/dL (09 Sep 2019 21:04)  POCT Blood Glucose.: 111 mg/dL (09 Sep 2019 16:26)      A/P:73yMale with hx of DM presenting for management of CAD now now s/p OPCAB     1.  DM: type 2, controlled, complicated.   Hba1c 7.3  Cr/GFR 0.89/98  EF 50%  Please decrease to 16 lantus at bedtime  Please continue lispro 7 units TID with meals.   Continue lispro moderate dose scale with meals and at bedtime.   Continue consistent carb diet  FSG Goal 100-180    2.  Hyperlipidemia - goal LDL < 70  continue statin    Goal FSG is 120-150  Will continue to monitor   For discharge, pt can be discharged on oral meds with metformin 1000mg BID and januvia 100mg daily.    Pt can follow up at discharge with [facility] by calling  to make an appointment.   Discussed case with     and updated primary team

## 2019-09-10 NOTE — PROGRESS NOTE ADULT - SUBJECTIVE AND OBJECTIVE BOX
Patient discussed on morning rounds with Dr. Navas     Operation / Date: 9/6/19 CABGx3      SUBJECTIVE ASSESSMENT:  73y Male with PMH significant for CAD underwent CABG is visited at bedside.  Patient is afebrile and hemodynamically stable.  Pigtail placed does not created pain or difficulty from movement.  He denies chest pain, shortness of breath or syncopes.  Patient reports positive for BM without difficulty and ambulated without assistance.      Vital Signs Last 24 Hrs  T(C): 36.6 (10 Sep 2019 09:00), Max: 37.5 (09 Sep 2019 22:12)  T(F): 97.9 (10 Sep 2019 09:00), Max: 99.5 (09 Sep 2019 22:12)  HR: 96 (10 Sep 2019 12:15) (85 - 96)  BP: 108/70 (10 Sep 2019 12:15) (106/62 - 121/68)  BP(mean): 83 (10 Sep 2019 12:15) (83 - 97)  RR: 18 (10 Sep 2019 12:15) (16 - 20)  SpO2: 97% (10 Sep 2019 12:15) (93% - 98%)  I&O's Detail    09 Sep 2019 07:01  -  10 Sep 2019 07:00  --------------------------------------------------------  IN:    Albumin 5%  - 250 mL: 500 mL    Oral Fluid: 260 mL  Total IN: 760 mL    OUT:    Chest Tube: 470 mL    Voided: 1475 mL  Total OUT: 1945 mL    Total NET: -1185 mL      10 Sep 2019 07:01  -  10 Sep 2019 13:06  --------------------------------------------------------  IN:  Total IN: 0 mL    OUT:    Voided: 100 mL  Total OUT: 100 mL    Total NET: -100 mL    CHEST TUBE: pigtail in left pleural and removed today 9/19/19  TONE DRAIN: YES  EPICARDIAL WIRES: YES  TIE DOWNS: YES  BARRAZA: YES    PHYSICAL EXAM:    General: NAD    Neurological: grossly intact.  AOX3    Cardiovascular: RRR without murmur     Respiratory: no wheezing and no rhonchi    Gastrointestinal: BM and BS are intact     Extremities: no edema bilaterally and no ulceration bilaterally     Vascular: pulses are intact bilaterally     Incision Sites: intact.  no drainage and no erythematous     LABS:                        7.9    6.32  )-----------( 144      ( 10 Sep 2019 05:50 )             25.2       COUMADIN: no    PT/INR - ( 09 Sep 2019 03:36 )   PT: 14.0 sec;   INR: 1.23     PTT - ( 09 Sep 2019 03:36 )  PTT:35.9 sec    09-10    141  |  106  |  10  ----------------------------<  99  4.2   |  24  |  0.93    Ca    8.4      10 Sep 2019 05:50  Phos  2.3     09-09  Mg     2.2     09-10      MEDICATIONS  (STANDING):  aspirin enteric coated 81 milliGRAM(s) Oral daily  atorvastatin 80 milliGRAM(s) Oral at bedtime  chlorhexidine 2% Cloths 1 Application(s) Topical <User Schedule>  clopidogrel Tablet 75 milliGRAM(s) Oral daily  dextrose 5%. 1000 milliLiter(s) (50 mL/Hr) IV Continuous <Continuous>  docusate sodium 100 milliGRAM(s) Oral three times a day  fluticasone propionate 50 MICROgram(s)/spray Nasal Spray 1 Spray(s) Both Nostrils two times a day  heparin  Injectable 5000 Unit(s) SubCutaneous every 8 hours  insulin glargine Injectable (LANTUS) 16 Unit(s) SubCutaneous at bedtime  insulin lispro (HumaLOG) corrective regimen sliding scale   SubCutaneous Before meals and at bedtime  insulin lispro Injectable (HumaLOG) 7 Unit(s) SubCutaneous three times a day before meals  metoprolol tartrate 12.5 milliGRAM(s) Oral every 12 hours  pantoprazole    Tablet 40 milliGRAM(s) Oral before breakfast  senna 2 Tablet(s) Oral at bedtime  sodium chloride 0.9% lock flush 3 milliLiter(s) IV Push every 8 hours    MEDICATIONS  (PRN):  acetaminophen   Tablet .. 650 milliGRAM(s) Oral every 6 hours PRN Temp greater or equal to 38C (100.4F), Mild Pain (1 - 3)  ALBUTerol    90 MICROgram(s) HFA Inhaler 2 Puff(s) Inhalation every 6 hours PRN Shortness of Breath  dextrose 40% Gel 15 Gram(s) Oral once PRN Blood Glucose LESS THAN 70 milliGRAM(s)/deciliter  glucagon  Injectable 1 milliGRAM(s) IntraMuscular once PRN Glucose LESS THAN 70 milligrams/deciliter  traMADol 25 milliGRAM(s) Oral every 6 hours PRN Moderate Pain (4 - 6)        RADIOLOGY & ADDITIONAL TESTS:  PA/LAT   performed and await on official read Home

## 2019-09-10 NOTE — PROGRESS NOTE ADULT - ASSESSMENT
73y Male with PMH significant for CAD underwent CABG is visited at bedside.  Patient is afebrile and hemodynamically stable.  Pigtail placed does not created pain or difficulty from movement.  He denies chest pain, shortness of breath or syncopes.  Patient reports positive for BM without difficulty and ambulated without assistance.    Neurovascular: No delirium.   - Pain well controlled with current PRN regimen.    Cardiovascular: CAD with s/p CABG   - ASA/Plavix/Statin/BB  - continue surgical recovery     Respiratory: pleural effusion with s/p pigtail placement   - pigtail removal   - PA/LAT today   - increase ambulation and wean off oxygen support     Hematologic: stable with H/H 7.9/25.2, asymptomatic   - continue to monitor for signs and symptoms of bleeding and anemia   - repeat lab in AM    ID: afebrile WBC 6.32  - continue to monitor vitals and labs   - Observe for SIRS/Sepsis Syndrome.    Prophylaxis:  - DVT prophylaxis with 5000 SubQ Heparin q8h and SCD's  - GI prophylaxis with PPI and oral intake     Disposition:  - remain current care  - discharge home in AM when medically optimized 73y Male with PMH significant for CAD underwent CABG is visited at bedside.  Patient is afebrile and hemodynamically stable.  Pigtail placed does not created pain or difficulty from movement.  He denies chest pain, shortness of breath or syncopes.  Patient reports positive for BM without difficulty and ambulated without assistance.    Neurovascular: No delirium.   - Pain well controlled with current PRN regimen.    Cardiovascular: CAD with s/p CABG   - ASA/Plavix/Statin/BB  - continue surgical recovery     Respiratory: pleural effusion with s/p pigtail placement   - pigtail removal   - PA/LAT today   - increase ambulation and wean off oxygen support     Endo: DMT2  Hg1AC 7.3  - add evening sliding scale coverage   - continue current regimen during the hospitalization   - will discharge with Metformin 1000mg BID and Januvia 100mg daily      Hematologic: stable with H/H 7.9/25.2, asymptomatic   - continue to monitor for signs and symptoms of bleeding and anemia   - repeat lab in AM    ID: afebrile WBC 6.32  - continue to monitor vitals and labs   - Observe for SIRS/Sepsis Syndrome.    Prophylaxis:  - DVT prophylaxis with 5000 SubQ Heparin q8h and SCD's  - GI prophylaxis with PPI and oral intake     Disposition:  - remain current care  - discharge home in AM when medically optimized

## 2019-09-11 ENCOUNTER — TRANSCRIPTION ENCOUNTER (OUTPATIENT)
Age: 74
End: 2019-09-11

## 2019-09-11 VITALS
HEART RATE: 96 BPM | OXYGEN SATURATION: 96 % | SYSTOLIC BLOOD PRESSURE: 114 MMHG | RESPIRATION RATE: 16 BRPM | DIASTOLIC BLOOD PRESSURE: 60 MMHG

## 2019-09-11 LAB
ANION GAP SERPL CALC-SCNC: 11 MMOL/L — SIGNIFICANT CHANGE UP (ref 5–17)
APTT BLD: 28.5 SEC — SIGNIFICANT CHANGE UP (ref 27.5–36.3)
BUN SERPL-MCNC: 10 MG/DL — SIGNIFICANT CHANGE UP (ref 7–23)
CALCIUM SERPL-MCNC: 8.4 MG/DL — SIGNIFICANT CHANGE UP (ref 8.4–10.5)
CHLORIDE SERPL-SCNC: 105 MMOL/L — SIGNIFICANT CHANGE UP (ref 96–108)
CO2 SERPL-SCNC: 24 MMOL/L — SIGNIFICANT CHANGE UP (ref 22–31)
CREAT SERPL-MCNC: 0.9 MG/DL — SIGNIFICANT CHANGE UP (ref 0.5–1.3)
GLUCOSE BLDC GLUCOMTR-MCNC: 137 MG/DL — HIGH (ref 70–99)
GLUCOSE BLDC GLUCOMTR-MCNC: 96 MG/DL — SIGNIFICANT CHANGE UP (ref 70–99)
GLUCOSE SERPL-MCNC: 79 MG/DL — SIGNIFICANT CHANGE UP (ref 70–99)
HCT VFR BLD CALC: 23.8 % — LOW (ref 39–50)
HCT VFR BLD CALC: 25.9 % — LOW (ref 39–50)
HGB BLD-MCNC: 7.7 G/DL — LOW (ref 13–17)
HGB BLD-MCNC: 8.4 G/DL — LOW (ref 13–17)
INR BLD: 1.2 — HIGH (ref 0.88–1.16)
MAGNESIUM SERPL-MCNC: 2 MG/DL — SIGNIFICANT CHANGE UP (ref 1.6–2.6)
MCHC RBC-ENTMCNC: 32.2 PG — SIGNIFICANT CHANGE UP (ref 27–34)
MCHC RBC-ENTMCNC: 32.3 PG — SIGNIFICANT CHANGE UP (ref 27–34)
MCHC RBC-ENTMCNC: 32.4 GM/DL — SIGNIFICANT CHANGE UP (ref 32–36)
MCHC RBC-ENTMCNC: 32.4 GM/DL — SIGNIFICANT CHANGE UP (ref 32–36)
MCV RBC AUTO: 99.6 FL — SIGNIFICANT CHANGE UP (ref 80–100)
MCV RBC AUTO: 99.6 FL — SIGNIFICANT CHANGE UP (ref 80–100)
NRBC # BLD: 0 /100 WBCS — SIGNIFICANT CHANGE UP (ref 0–0)
NRBC # BLD: 0 /100 WBCS — SIGNIFICANT CHANGE UP (ref 0–0)
PLATELET # BLD AUTO: 175 K/UL — SIGNIFICANT CHANGE UP (ref 150–400)
PLATELET # BLD AUTO: 184 K/UL — SIGNIFICANT CHANGE UP (ref 150–400)
POTASSIUM SERPL-MCNC: 3.6 MMOL/L — SIGNIFICANT CHANGE UP (ref 3.5–5.3)
POTASSIUM SERPL-SCNC: 3.6 MMOL/L — SIGNIFICANT CHANGE UP (ref 3.5–5.3)
PROTHROM AB SERPL-ACNC: 13.6 SEC — HIGH (ref 10–12.9)
RBC # BLD: 2.39 M/UL — LOW (ref 4.2–5.8)
RBC # BLD: 2.6 M/UL — LOW (ref 4.2–5.8)
RBC # FLD: 13.3 % — SIGNIFICANT CHANGE UP (ref 10.3–14.5)
RBC # FLD: 13.3 % — SIGNIFICANT CHANGE UP (ref 10.3–14.5)
SODIUM SERPL-SCNC: 140 MMOL/L — SIGNIFICANT CHANGE UP (ref 135–145)
WBC # BLD: 6.98 K/UL — SIGNIFICANT CHANGE UP (ref 3.8–10.5)
WBC # BLD: 7.59 K/UL — SIGNIFICANT CHANGE UP (ref 3.8–10.5)
WBC # FLD AUTO: 6.98 K/UL — SIGNIFICANT CHANGE UP (ref 3.8–10.5)
WBC # FLD AUTO: 7.59 K/UL — SIGNIFICANT CHANGE UP (ref 3.8–10.5)

## 2019-09-11 PROCEDURE — 99231 SBSQ HOSP IP/OBS SF/LOW 25: CPT | Mod: GC

## 2019-09-11 PROCEDURE — 71045 X-RAY EXAM CHEST 1 VIEW: CPT | Mod: 26

## 2019-09-11 RX ORDER — SENNA PLUS 8.6 MG/1
2 TABLET ORAL
Qty: 60 | Refills: 0
Start: 2019-09-11 | End: 2019-10-10

## 2019-09-11 RX ORDER — METFORMIN HYDROCHLORIDE 850 MG/1
1 TABLET ORAL
Qty: 0 | Refills: 0 | DISCHARGE

## 2019-09-11 RX ORDER — METOPROLOL TARTRATE 50 MG
25 TABLET ORAL
Refills: 0 | Status: DISCONTINUED | OUTPATIENT
Start: 2019-09-11 | End: 2019-09-11

## 2019-09-11 RX ORDER — METOPROLOL TARTRATE 50 MG
1 TABLET ORAL
Qty: 0 | Refills: 0 | DISCHARGE

## 2019-09-11 RX ORDER — ACETAMINOPHEN 500 MG
1 TABLET ORAL
Qty: 21 | Refills: 0
Start: 2019-09-11 | End: 2019-09-17

## 2019-09-11 RX ORDER — CLOPIDOGREL BISULFATE 75 MG/1
1 TABLET, FILM COATED ORAL
Qty: 30 | Refills: 0
Start: 2019-09-11 | End: 2019-10-10

## 2019-09-11 RX ORDER — ATORVASTATIN CALCIUM 80 MG/1
1 TABLET, FILM COATED ORAL
Qty: 0 | Refills: 0 | DISCHARGE

## 2019-09-11 RX ORDER — TRAMADOL HYDROCHLORIDE 50 MG/1
0.5 TABLET ORAL
Qty: 12 | Refills: 0
Start: 2019-09-11 | End: 2019-09-18

## 2019-09-11 RX ORDER — ATORVASTATIN CALCIUM 80 MG/1
1 TABLET, FILM COATED ORAL
Qty: 30 | Refills: 0
Start: 2019-09-11 | End: 2019-10-10

## 2019-09-11 RX ORDER — PANTOPRAZOLE SODIUM 20 MG/1
1 TABLET, DELAYED RELEASE ORAL
Qty: 30 | Refills: 0
Start: 2019-09-11 | End: 2019-10-10

## 2019-09-11 RX ORDER — METFORMIN HYDROCHLORIDE 850 MG/1
1 TABLET ORAL
Qty: 60 | Refills: 0
Start: 2019-09-11 | End: 2019-10-10

## 2019-09-11 RX ORDER — POTASSIUM CHLORIDE 20 MEQ
40 PACKET (EA) ORAL EVERY 4 HOURS
Refills: 0 | Status: COMPLETED | OUTPATIENT
Start: 2019-09-11 | End: 2019-09-11

## 2019-09-11 RX ORDER — LISINOPRIL 2.5 MG/1
1 TABLET ORAL
Qty: 0 | Refills: 0 | DISCHARGE

## 2019-09-11 RX ORDER — METOPROLOL TARTRATE 50 MG
1 TABLET ORAL
Qty: 60 | Refills: 0
Start: 2019-09-11 | End: 2019-10-10

## 2019-09-11 RX ORDER — METFORMIN HYDROCHLORIDE 850 MG/1
2 TABLET ORAL
Qty: 120 | Refills: 0
Start: 2019-09-11 | End: 2019-10-10

## 2019-09-11 RX ORDER — ASPIRIN/CALCIUM CARB/MAGNESIUM 324 MG
1 TABLET ORAL
Qty: 0 | Refills: 0 | DISCHARGE

## 2019-09-11 RX ORDER — METOPROLOL TARTRATE 50 MG
12.5 TABLET ORAL ONCE
Refills: 0 | Status: COMPLETED | OUTPATIENT
Start: 2019-09-11 | End: 2019-09-11

## 2019-09-11 RX ORDER — DOCUSATE SODIUM 100 MG
1 CAPSULE ORAL
Qty: 30 | Refills: 0
Start: 2019-09-11 | End: 2019-10-10

## 2019-09-11 RX ORDER — SITAGLIPTIN 50 MG/1
1 TABLET, FILM COATED ORAL
Qty: 30 | Refills: 0
Start: 2019-09-11 | End: 2019-10-10

## 2019-09-11 RX ORDER — ASPIRIN/CALCIUM CARB/MAGNESIUM 324 MG
1 TABLET ORAL
Qty: 30 | Refills: 0
Start: 2019-09-11 | End: 2019-10-10

## 2019-09-11 RX ORDER — INFLUENZA VIRUS VACCINE 15; 15; 15; 15 UG/.5ML; UG/.5ML; UG/.5ML; UG/.5ML
0.5 SUSPENSION INTRAMUSCULAR ONCE
Refills: 0 | Status: COMPLETED | OUTPATIENT
Start: 2019-09-11 | End: 2019-09-11

## 2019-09-11 RX ADMIN — Medication 40 MILLIEQUIVALENT(S): at 13:51

## 2019-09-11 RX ADMIN — Medication 12.5 MILLIGRAM(S): at 05:04

## 2019-09-11 RX ADMIN — Medication 40 MILLIEQUIVALENT(S): at 10:44

## 2019-09-11 RX ADMIN — TRAMADOL HYDROCHLORIDE 25 MILLIGRAM(S): 50 TABLET ORAL at 05:00

## 2019-09-11 RX ADMIN — SODIUM CHLORIDE 3 MILLILITER(S): 9 INJECTION INTRAMUSCULAR; INTRAVENOUS; SUBCUTANEOUS at 05:05

## 2019-09-11 RX ADMIN — CLOPIDOGREL BISULFATE 75 MILLIGRAM(S): 75 TABLET, FILM COATED ORAL at 13:50

## 2019-09-11 RX ADMIN — PANTOPRAZOLE SODIUM 40 MILLIGRAM(S): 20 TABLET, DELAYED RELEASE ORAL at 07:57

## 2019-09-11 RX ADMIN — Medication 1 SPRAY(S): at 05:05

## 2019-09-11 RX ADMIN — CHLORHEXIDINE GLUCONATE 1 APPLICATION(S): 213 SOLUTION TOPICAL at 05:04

## 2019-09-11 RX ADMIN — INFLUENZA VIRUS VACCINE 0.5 MILLILITER(S): 15; 15; 15; 15 SUSPENSION INTRAMUSCULAR at 10:41

## 2019-09-11 RX ADMIN — HEPARIN SODIUM 5000 UNIT(S): 5000 INJECTION INTRAVENOUS; SUBCUTANEOUS at 05:03

## 2019-09-11 RX ADMIN — Medication 81 MILLIGRAM(S): at 13:49

## 2019-09-11 RX ADMIN — TRAMADOL HYDROCHLORIDE 25 MILLIGRAM(S): 50 TABLET ORAL at 04:18

## 2019-09-11 NOTE — PROGRESS NOTE ADULT - PROVIDER SPECIALTY LIST ADULT
CT Surgery
Cardiology
Critical Care
Endocrinology
Critical Care

## 2019-09-11 NOTE — DISCHARGE NOTE PROVIDER - CARE PROVIDERS DIRECT ADDRESSES
[email or web address],DirectAddress_Unknown

## 2019-09-11 NOTE — DISCHARGE NOTE PROVIDER - NSDCFUSCHEDAPPT_GEN_ALL_CORE_FT
[facility] ; 09/24/2019 ; NPP CT Surg [address] [facility] ; 09/25/2019 ; NPVERENA HeartVasc [address] [facility] ; 09/27/2019 ; NPP Endocrin [address] [facility] ; 09/17/2019 ; NPP CT Surg [address] [facility] ; 09/24/2019 ; NPP CT Surg [address] [facility] ; 09/25/2019 ; MIESHA HeartVasc [address] [facility] ; 09/27/2019 ; NPP Endocrin [address] ; 09/17/2019 ; NPP CT Surg [address] [facility] ; 09/24/2019 ; NPP CT Surg [address] [facility] ; 09/25/2019 ; MIESHA HeartVasc [address] [facility] ; 09/27/2019 ; NPP Endocrin [address] ; 09/17/2019 ; NPP CT Surg [address] [facility] ; 09/24/2019 ; NPP CT Surg [address] [facility] ; 09/25/2019 ; MIESHA HeartVasc [address] [facility] ; 09/27/2019 ; NPP Endocrin [address]

## 2019-09-11 NOTE — DISCHARGE NOTE PROVIDER - CARE PROVIDER_API CALL
Bart Navas)  Cardiovascular Surgery  [address], 4th [place] [address]  Phone: [phone]  Fax: [phone]  Follow Up Time:     Devan Santos)  Cardiology; Internal Medicine  [address] [address]  Phone: [phone]  Fax: [phone]  Follow Up Time:     Walt,   Endocrine clinic  Phone: (   )    -  Fax: (   )    -  Follow Up Time:

## 2019-09-11 NOTE — PROGRESS NOTE ADULT - ATTENDING COMMENTS
A/P 73yMale with hx of DM presenting for management of CAD now now s/p OPCAB     1.  DM:  type 2, controlled, complicated.   continue 18 lantus at bedtime, reduce lispro to 6 units TID with meals.   Continue lispro moderate dose scale with meals and at bedtime.   Continue consistent carb diet  FSG Goal 100-180    2.  Hyperlipidemia - goal LDL < 70  continue statin    Pt is advised to follow up with me at discharge by calling .
Pt seen on rounds this afternoon prior to discharge.  Glucoses have been well controlled on modest doses of insulin, confirming out suspicion that he will be controllable with oral agents post discharge.  As previously planned, will discharge on combination therapy with metformin (which he will resume at 500 mg ER BID to start, then increase gradually to 1000 mg BID over the next week if no intolerance) and Januvia.  Explained to him that he needs the januvia for post-prandial control.
Pt seen on rounds this afternoon.  Glucoses remain well controlled on Lantus/lispro, but will still plan to discharge on oral agents.  Will likely increase the metformin to 1000 mg BID, however, and add Januvia.
Pt seen on rounds this afternoon and events of the weekend were reviewed.  Glucoses have been mostly in target range, though the pre-breakfast value today dipped to 96 and will decrease the Lantus slightly to 16 units.  His admitting A1c level was only mildly above goal, and he is almost definitely controllable with oral agents--though the metformin should be increased and a secretagogue (probably Januvia) added.
See resident note written above, for details. I reviewed the resident documentation.  I reviewed vitals, labs, medications, cardiac studies and additional imaging.  I agree with the resident's findings and plans as written above with the following additions/amendments:  Physical Exam notable for: elderly male laying in bed in NAD, flat neck veins, RRR, no MGR detected, clear lungs, overly nourished abdomen, NTTP, no fluid wave detected,  no pretibial pitting edema, no ankle edema, skin WWP throughout, A&Ox3  Plan for:  NPO pMN for CABG 9/6AM with Dr. Navas  Pre op work up ordered  Lisinopril dc'd sunil op  cont BB and high intensity statin  HÉCTOR Martin.  Cardiology Attending

## 2019-09-11 NOTE — DISCHARGE NOTE NURSING/CASE MANAGEMENT/SOCIAL WORK - PATIENT PORTAL LINK FT
You can access the FollowMyHealth Patient Portal offered by [facility] by registering at the following website: [email or web address]. By joining [facility] FollowMyHealth portal, you will also be able to view your health information using other applications (apps) compatible with our system.

## 2019-09-11 NOTE — PROGRESS NOTE ADULT - ASSESSMENT
CABG  Aspirin               [ x ] Yes  [  ] Contraindicated, Reason_______________________________  Beta-Blocker     [x ] Yes  [  ]Contraindicated, Reason_______________________________  Statin                 [ x ] Yes  [  ] Contraindicated, Reason_______________________________

## 2019-09-11 NOTE — PROGRESS NOTE ADULT - SUBJECTIVE AND OBJECTIVE BOX
INTERVAL HPI/OVERNIGHT EVENTS:    Patient seen and examined at the bedside. no acute events overnight. He has been getting outside food brought by his wife and eating but he has been complaint with this diet. He is for discharge today.    FSG & Insulin received:  9/10:  Dinner . Lispro 7+2. Ate few spoons of rice and beans, and small portion of oatmeal.  Bedtime . Lantus 17.  9/11:  Breakfast FSG 96. No insulin. 1 cup oatmeal.  Lunch .       Pt reports the following symptoms:    CONSTITUTIONAL:  Negative fever or chills, feels well, good appetite  EYES:  Negative  blurry vision or double vision  CARDIOVASCULAR:  Negative for chest pain or palpitations  RESPIRATORY:  Negative for cough, wheezing, or SOB   GASTROINTESTINAL:  Negative for nausea, vomiting, diarrhea, constipation, or abdominal pain  GENITOURINARY:  Negative frequency, urgency or dysuria  NEUROLOGIC:  No headache, confusion, dizziness, lightheadedness    MEDICATIONS  (STANDING):  aspirin enteric coated 81 milliGRAM(s) Oral daily  atorvastatin 80 milliGRAM(s) Oral at bedtime  chlorhexidine 2% Cloths 1 Application(s) Topical <User Schedule>  clopidogrel Tablet 75 milliGRAM(s) Oral daily  dextrose 5%. 1000 milliLiter(s) (50 mL/Hr) IV Continuous <Continuous>  docusate sodium 100 milliGRAM(s) Oral three times a day  fluticasone propionate 50 MICROgram(s)/spray Nasal Spray 1 Spray(s) Both Nostrils two times a day  heparin  Injectable 5000 Unit(s) SubCutaneous every 8 hours  insulin glargine Injectable (LANTUS) 17 Unit(s) SubCutaneous at bedtime  insulin lispro (HumaLOG) corrective regimen sliding scale   SubCutaneous Before meals and at bedtime  insulin lispro Injectable (HumaLOG) 7 Unit(s) SubCutaneous three times a day before meals  metoprolol tartrate 25 milliGRAM(s) Oral two times a day  pantoprazole    Tablet 40 milliGRAM(s) Oral before breakfast  senna 2 Tablet(s) Oral at bedtime  sodium chloride 0.9% lock flush 3 milliLiter(s) IV Push every 8 hours    MEDICATIONS  (PRN):  acetaminophen   Tablet .. 650 milliGRAM(s) Oral every 6 hours PRN Temp greater or equal to 38C (100.4F), Mild Pain (1 - 3)  ALBUTerol    90 MICROgram(s) HFA Inhaler 2 Puff(s) Inhalation every 6 hours PRN Shortness of Breath  dextrose 40% Gel 15 Gram(s) Oral once PRN Blood Glucose LESS THAN 70 milliGRAM(s)/deciliter  glucagon  Injectable 1 milliGRAM(s) IntraMuscular once PRN Glucose LESS THAN 70 milligrams/deciliter  traMADol 25 milliGRAM(s) Oral every 6 hours PRN Moderate Pain (4 - 6)      PHYSICAL EXAM  Vital Signs Last 24 Hrs  T(C): 37.7 (11 Sep 2019 09:02), Max: 37.9 (10 Sep 2019 18:55)  T(F): 99.8 (11 Sep 2019 09:02), Max: 100.2 (10 Sep 2019 18:55)  HR: 96 (11 Sep 2019 13:55) (84 - 102)  BP: 114/60 (11 Sep 2019 13:55) (103/67 - 125/74)  BP(mean): 90 (11 Sep 2019 13:55) (70 - 101)  RR: 16 (11 Sep 2019 13:55) (16 - 18)  SpO2: 96% (11 Sep 2019 13:55) (96% - 98%)    Constitutional: wn/wd in NAD.   HEENT: NCAT, MMM, OP clear, EOMI, no proptosis or lid retraction  Neck: no thyromegaly or palpable thyroid nodules   Respiratory: lungs CTAB.  Cardiovascular: regular rhythm, normal S1 and S2, no audible murmurs, no peripheral edema  GI: soft, NT/ND, no masses/HSM appreciated.  Neurology: no tremors, DTR 2+  Skin: no visible rashes/lesions  Psychiatric: AAO x 3, normal affect/mood.    LABS:                        8.4    7.59  )-----------( 184      ( 11 Sep 2019 08:15 )             25.9     09-11    140  |  105  |  10  ----------------------------<  79  3.6   |  24  |  0.90    Ca    8.4      11 Sep 2019 05:52  Mg     2.0     09-11      PT/INR - ( 11 Sep 2019 05:51 )   PT: 13.6 sec;   INR: 1.20          PTT - ( 11 Sep 2019 05:51 )  PTT:28.5 sec    Thyroid Stimulating Hormone, Serum: 0.780 uIU/mL (09-07 @ 03:28)      HbA1C: 7.3 % (09-04 @ 15:56)    CAPILLARY BLOOD GLUCOSE      POCT Blood Glucose.: 137 mg/dL (11 Sep 2019 11:53)  POCT Blood Glucose.: 96 mg/dL (11 Sep 2019 06:50)  POCT Blood Glucose.: 110 mg/dL (10 Sep 2019 21:55)  POCT Blood Glucose.: 170 mg/dL (10 Sep 2019 16:48)      A/P:73yMale with hx of DM presenting for management of CAD now now s/p OPCAB     1.  DM: type 2, controlled, complicated.   Hba1c 7.3  Cr/GFR 0.89/98  EF 50%  Please continue 16 lantus at bedtime  Please continue lispro 7 units TID with meals.   Continue lispro moderate dose scale with meals and at bedtime.   Continue consistent carb diet  FSG Goal 100-180    2.  Hyperlipidemia - goal LDL < 70  continue statin    Goal FSG is 120-150  Will continue to monitor   For discharge, pt can be discharged on oral meds with metformin 500mg ER 2 tablets BID (pt instructed to start with 2 tablets for a few days to decrease any GI SE and if tolerated then increase to 2 tabs twice daily) and januvia 100mg daily.  Pt declines f/u with endocrinology and will f/u with PCP.    Pt can follow up at discharge with [facility] by calling  to make an appointment.   Discussed case with     and updated primary teamdocrinology Group by calling  to make an appointment.         REMINDERS FOR INSULIN/DIABETES SUPPLIES at DISCHARGE:  INSULIN:   Long actin/Basal Insulin: Examples: Toujeo, Basaglar, Tresiba, Lantus   Short acting/Bolus Insulin: Humalog, Admelog, Novolog  Please ensure that BOTH short acting and long acting insulin are prescribed in the same preparation (Ex: PEN vs VIAL/SOLUTION)     TESTING SUPPLIES:   All glucometer supplies should be written as generic to avoid issues with insurance. Use the free text option in [facility] prescription writer, and type in glucometer test strips, lancets, etc to order.    If sending patient home on insulin PEN, please send:   •	BD anna insulin pen needles for use up to 4 times daily (total quantity 100)  •	Lancets for use up to 4 times daily (total quantity 100)  •	Glucometer Test strips for use up to 4 times daily (total quantity 100)  •	Alcohol swabs for use up to 4 times daily (total quantity 100)  •	Glucometer (If provided by hospital, still provide scripts for lancets, test strips, and swabs)  If sending patient home on insulin VIAL, please send:   •	Insulin syringes (6mm) - for use up to 4 times daily (total quantity 100)  •	Lancets for use up to 4 times daily (total quantity 100)  •	Generic Glucometer Test strips for use up to 4 times daily (total quantity 100)  •	Alcohol swabs for use up to 4 times daily (total quantity 100)  •	Generic Glucometer (If provided by hospital, still provide scripts for lancets, test strips, and swabs)  •	Do not specify brand for testing supplies (such as contour, freestyle, one touch etc) that way the pharmacy has the freedom to pick and change according to what the insurance dictates.  For patients without insurance:   •	Provide social work with appropriate scripts so they may obtain 1 week of samples  •	Provide with glucometer. Glucometers are located at various nursing stations, the nursing office, education, and endocrine fellows office.  •	Please make appointment with Rena Joshua NP or Cora Brock RN and MIGEL Salcedo at the [address] [place] [address] endocrinology clinic. They can see patients without insurance, provide appropriate samples, and assist in getting insurance coverage.     PREFERRED PHARMACY:  [facility] (located on 1st floor next to admitting)  P: [phone]  Hours: M – F 8AM – 8PM, Sat 8AM – 4PM, Sun—closed  If not using [facility], please follow up with chosen pharmacy to ensure insulin prescribed is covered.

## 2019-09-11 NOTE — DISCHARGE NOTE NURSING/CASE MANAGEMENT/SOCIAL WORK - NSDCFUADDAPPT_GEN_ALL_CORE_FT
-Please follow up with Dr. Navas/Izabel on 9/17/19 at 12pm.  The office is located at [facility], 4th floor. Call us with any questions #[phone].  - please follow up with Dr. Santos on 9/25/19 at 10am.   - Please follow up with DR. Godoy on 9/27/19      -Walk daily as tolerated and use your incentive spirometer every hour.    -No driving or strenuous activity/exercise for 6 weeks, or until cleared by your surgeon.    -Gently clean your incisions with anti-bacterial soap and water, pat dry.  You may leave them open to air.    -Call your doctor if you have shortness of breath, chest pain not relieved by pain medication, dizziness, fever >101.5, or increased redness or drainage from incisions.

## 2019-09-11 NOTE — PROGRESS NOTE ADULT - NSHPATTENDINGPLANDISCUSS_GEN_ALL_CORE
CT Surgery Team
Ms. Acnua, pt and CT Surgery team
Ms. Acuna and CT Surgery
Dr. Jeter and CT Surgery
the patient, cardiac team and notification sent to outpt cardiologist Dr. Santos

## 2019-09-11 NOTE — DISCHARGE NOTE PROVIDER - HOSPITAL COURSE
72 y/o M FORMER SMOKER/FORMER DRUG USE/FORMER ETOH USE with FHx of CAD (MI mother: age 80s) PMH of HTN, DM, asthma (denies any hospitalization/intubation);  who was referred to cardiologist Dr. Santos by PCP for abnormal ECG.  Pt. reports that 2 weeks ago; he was in the bus with his wife when he started to sweat profusely; reports of having mid-sternal; non-radiating chest tightness describes it as pulling; he thought he had indigestion and reports feeling better after having seltzer water. His wife was concerned; thus they went to see his PCP who did a EKG and referred the pt. to Dr. Santos; Pt. reports that was the only episode he had so far; denies any CP and SOB at baseline, dizziness, diaphoresis, palpitations, fatigue, LE edema, orthopnea, PND, syncope. He reports that he is very active and works 5 days/week. Subsequently; Pt had a ECHO 08/23/19 revealing EF 40-45 %; basal mid and distal inferior wall hypokinesis. EKG as per MD note revealed inferior infarction.  Due to pts risk factors, CCS Angina Class 4 Sx, abnormal EKG and ECHO , pt is referred for cardiac catheterization w/ possible intervention.     Of note; pt. was initially started on Metoprolol 25 mg QD; Plavix 75 mg daily (first dose 8/23/19) and Atorvastatin 80 mg once a day by DR. Santos last week in the office 74 y/o M FORMER SMOKER/FORMER DRUG USE/FORMER ETOH USE with FHx of CAD (MI mother: age 80s) PMH of HTN, DM, asthma (denies any hospitalization/intubation);  who was referred to cardiologist Dr. Santos by PCP for abnormal ECG.  Pt. reports that 2 weeks ago; he was in the bus with his wife when he started to sweat profusely; reports of having mid-sternal; non-radiating chest tightness describes it as pulling; he thought he had indigestion and reports feeling better after having seltzer water. His wife was concerned; thus they went to see his PCP who did a EKG and referred the pt. to Dr. Santos; Pt. reports that was the only episode he had so far; denies any CP and SOB at baseline, dizziness, diaphoresis, palpitations, fatigue, LE edema, orthopnea, PND, syncope. He reports that he is very active and works 5 days/week. Subsequently; Pt had a ECHO 08/23/19 revealing EF 40-45 %; basal mid and distal inferior wall hypokinesis. EKG as per MD note revealed inferior infarction.  Due to pts risk factors, CCS Angina Class 4 Sx, abnormal EKG and ECHO , pt is referred for cardiac catheterization w/ possible intervention.  On 09/06/19, patient was taken to the operation room and CABG was performed.  Patient tolerated the procedures well.  For further details, please refere the operative reports.  Postoperatively, patient recovered in cardiac intensive care.  Overnight, he has weaned off ventilation and extubated.  He has weaned off supporting drips.  Dual anti-platelet therapy with ASA and plavix was initiated along with Statin and beta blocker.  Patient tolerated well.  On POD#1, patient has transitioned off the insulin drip with 7units NPH insulin and endocrinology is consulted to assisted his care.  On POD#2, patient presented with orthostasis probably related to post op anemia.  In the meantime, he is hemodynamically stable.  Endocrinology continues to follow him.  Insulin dose has been adjusted to meet his needs.  On POD#3, patient is stepped down for further care.  A pigtail was placed at bedside due to left pleural effusion.  There was proximally 400cc evacuated and continue to be under suction.  Patient tolerated well.  On POD$4, patient is afebrile and hemodynamically stable.  The pigtail was removed with PA CXR demonstrating lung well expanded.  PA/Lat has confirmed the studies as well.  Throughout the day, patient continues to do well and ambulating in room air and tolerating oral intake.  On POD#5, patient is afebrile and hemodynamically.  A repeat CB has demonstrated his stable H/H.  Patient is discharged to home for further care,  He will continue oral anti-hyperglycemic regimen per recommended.          CABG    Aspirin               [ x ] Yes  [  ] Contraindicated, Reason_______________________________    Beta-Blocker     [ x ] Yes  [  ]Contraindicated, Reason_______________________________    Statin                 [ x ] Yes  [  ] Contraindicated, Reason_______________________________             35 minutes was spent with the patient reviewing the discharge material including medications, follow up appointments, recovery, concerning symptoms, and how to contact their health care providers if they have questions

## 2019-09-11 NOTE — DISCHARGE NOTE PROVIDER - PROVIDER TOKENS
PROVIDER:[TOKEN:[[id number]]],PROVIDER:[TOKEN:[[id number]]],FREE:[LAST:[Walt],PHONE:[(   )    -],FAX:[(   )    -],ADDRESS:[Endocrine clinic]]

## 2019-09-11 NOTE — DISCHARGE NOTE PROVIDER - NSDCFUADDAPPT_GEN_ALL_CORE_FT
-Please follow up with Dr. Navas/Izabel on 9/17/19 at 12pm.  The office is located at [facility], 4th floor. Call us with any questions #[phone].  - please follow up with Dr. Santos on 9/25/19 at 10am.   - Please follow up with DR. Godoy on 9/27/19      -Walk daily as tolerated and use your incentive spirometer every hour.    -No driving or strenuous activity/exercise for 6 weeks, or until cleared by your surgeon.    -Gently clean your incisions with anti-bacterial soap and water, pat dry.  You may leave them open to air.    -Call your doctor if you have shortness of breath, chest pain not relieved by pain medication, dizziness, fever >101.5, or increased redness or drainage from incisions.

## 2019-09-11 NOTE — DISCHARGE NOTE PROVIDER - NSDCACTIVITY_GEN_ALL_CORE
Walking - Indoors allowed/Walking - Outdoors allowed/Do not drive or operate machinery/No heavy lifting/straining/Do not make important decisions

## 2019-09-12 ENCOUNTER — APPOINTMENT (OUTPATIENT)
Dept: CARE COORDINATION | Facility: HOME HEALTH | Age: 74
End: 2019-09-12
Payer: MEDICARE

## 2019-09-12 DIAGNOSIS — Z86.39 PERSONAL HISTORY OF OTHER ENDOCRINE, NUTRITIONAL AND METABOLIC DISEASE: ICD-10-CM

## 2019-09-12 DIAGNOSIS — Z09 ENCOUNTER FOR FOLLOW-UP EXAMINATION AFTER COMPLETED TREATMENT FOR CONDITIONS OTHER THAN MALIGNANT NEOPLASM: ICD-10-CM

## 2019-09-12 DIAGNOSIS — Z86.79 PERSONAL HISTORY OF OTHER DISEASES OF THE CIRCULATORY SYSTEM: ICD-10-CM

## 2019-09-12 PROCEDURE — 99024 POSTOP FOLLOW-UP VISIT: CPT

## 2019-09-13 VITALS
SYSTOLIC BLOOD PRESSURE: 128 MMHG | HEART RATE: 76 BPM | OXYGEN SATURATION: 98 % | RESPIRATION RATE: 18 BRPM | TEMPERATURE: 98.1 F | DIASTOLIC BLOOD PRESSURE: 71 MMHG

## 2019-09-13 PROBLEM — Z86.39 HISTORY OF HYPERLIPIDEMIA: Status: RESOLVED | Noted: 2019-09-13 | Resolved: 2019-09-13

## 2019-09-13 PROBLEM — Z09 POSTOP CHECK: Status: ACTIVE | Noted: 2019-09-13

## 2019-09-13 PROBLEM — Z86.79 HISTORY OF HYPERTENSION: Status: RESOLVED | Noted: 2019-09-13 | Resolved: 2019-09-13

## 2019-09-13 PROBLEM — Z86.39 HISTORY OF DIABETES MELLITUS: Status: RESOLVED | Noted: 2019-09-13 | Resolved: 2019-09-13

## 2019-09-13 PROBLEM — Z87.891 FORMER SMOKER: Status: ACTIVE | Noted: 2019-09-13

## 2019-09-13 PROBLEM — Z87.898 HISTORY OF ILLICIT DRUG USE: Status: ACTIVE | Noted: 2019-09-13

## 2019-09-13 PROBLEM — I25.2 HISTORY OF MYOCARDIAL INFARCTION: Status: RESOLVED | Noted: 2019-08-23 | Resolved: 2019-09-13

## 2019-09-13 PROBLEM — Z87.898 FORMER CONSUMPTION OF ALCOHOL: Status: ACTIVE | Noted: 2019-09-13

## 2019-09-13 PROBLEM — Z82.49 FAMILY HISTORY OF HEART ATTACK: Status: ACTIVE | Noted: 2019-09-13

## 2019-09-13 RX ORDER — ALBUTEROL 90 MCG
90 AEROSOL (GRAM) INHALATION
Refills: 0 | Status: ACTIVE | COMMUNITY

## 2019-09-13 RX ORDER — LORATADINE ORAL 5 MG/5ML
5 SOLUTION ORAL
Refills: 0 | Status: COMPLETED | COMMUNITY
End: 2019-09-13

## 2019-09-13 RX ORDER — HYDROCODONE BITARTRATE AND ACETAMINOPHEN 7.5; 325 MG/1; MG/1
7.5-325 TABLET ORAL
Refills: 0 | Status: COMPLETED | COMMUNITY
End: 2019-09-13

## 2019-09-13 RX ORDER — ATORVASTATIN CALCIUM 80 MG/1
80 TABLET, FILM COATED ORAL DAILY
Qty: 90 | Refills: 3 | Status: COMPLETED | COMMUNITY
Start: 2019-08-23 | End: 2019-09-13

## 2019-09-13 RX ORDER — LISINOPRIL 2.5 MG/1
2.5 TABLET ORAL DAILY
Qty: 30 | Refills: 11 | Status: COMPLETED | COMMUNITY
End: 2019-09-13

## 2019-09-13 RX ORDER — ASPIRIN 81 MG
81 TABLET, DELAYED RELEASE (ENTERIC COATED) ORAL DAILY
Refills: 0 | Status: COMPLETED | COMMUNITY
End: 2019-09-13

## 2019-09-13 RX ORDER — CLOPIDOGREL BISULFATE 75 MG/1
75 TABLET, FILM COATED ORAL DAILY
Qty: 90 | Refills: 3 | Status: COMPLETED | COMMUNITY
Start: 2019-08-23 | End: 2019-09-13

## 2019-09-13 RX ORDER — ALBUTEROL SULFATE 90 UG/1
108 (90 BASE) INHALANT RESPIRATORY (INHALATION)
Refills: 0 | Status: COMPLETED | COMMUNITY
End: 2019-09-13

## 2019-09-13 RX ORDER — VITS A,C,E/LUTEIN/MINERALS 300MCG-200
TABLET ORAL DAILY
Refills: 0 | Status: COMPLETED | COMMUNITY
End: 2019-09-13

## 2019-09-13 RX ORDER — METOPROLOL SUCCINATE 25 MG/1
25 TABLET, EXTENDED RELEASE ORAL DAILY
Qty: 90 | Refills: 3 | Status: COMPLETED | COMMUNITY
Start: 2019-08-23 | End: 2019-09-13

## 2019-09-13 RX ORDER — METFORMIN HYDROCHLORIDE 500 MG/1
500 TABLET, COATED ORAL TWICE DAILY
Refills: 0 | Status: COMPLETED | COMMUNITY
End: 2019-09-13

## 2019-09-13 RX ORDER — ASPIRIN 81 MG
81 TABLET, DELAYED RELEASE (ENTERIC COATED) ORAL DAILY
Qty: 1 | Refills: 5 | Status: ACTIVE | COMMUNITY

## 2019-09-13 NOTE — HISTORY OF PRESENT ILLNESS
[FreeTextEntry1] : Alek Hernandes is a 74 Y/O male patient of Dr. Navas S/P CABG x3 Hospital Course: 72 y/o M FORMER SMOKER/FORMER DRUG USE/FORMER ETOH USE with FHx of CAD (MI mother: age 80s) PMH of HTN, DM, asthma (denies any hospitalization/intubation); who was referred to cardiologist Dr. Santos by PCP for abnormal ECG. Pt. reports that 2 weeks ago; he was in the bus with his wife when he started to sweat profusely; reports of having mid-sternal; non-radiating chest tightness describes it as pulling; he thought he had indigestion and reports feeling better after having seltzer water. His wife was concerned; thus they went to see his PCP who did a EKG and referred the pt. to Dr. Santos; Pt. reports that was the only episode he had so far; denies any CP and SOB at baseline, dizziness, diaphoresis, palpitations, fatigue, LE edema, orthopnea, PND, syncope. He reports that he is very active and works 5 days/week. Subsequently; Pt had a ECHO 08/23/19 revealing EF 40-45 %; basal mid and distal inferior wall hypokinesis. EKG as per MD note revealed inferior infarction. Due to pts risk factors, CCS Angina Class 4 Sx, abnormal EKG and ECHO , pt is referred for cardiac catheterization w/ possible intervention. On 09/06/19, patient was taken to the operation room and CABG was performed. Patient tolerated the procedures well. For further details, please refere the operative reports [Diabetes Mellitus] : Diabetes Mellitus [Dyslipidemia] : Dyslipidemia [Hypertension] : Hypertension

## 2019-09-13 NOTE — PHYSICAL EXAM
[Sclera] : the sclera and conjunctiva were normal [Neck Appearance] : the appearance of the neck was normal [] : no respiratory distress [Apical Impulse] : the apical impulse was normal [Examination Of The Chest] : the chest was normal in appearance [Breast Appearance] : normal in appearance [Bowel Sounds] : normal bowel sounds [Skin Color & Pigmentation] : normal skin color and pigmentation [No CVA Tenderness] : no ~M costovertebral angle tenderness [Cranial Nerves] : cranial nerves 2-12 were intact

## 2019-09-13 NOTE — ASSESSMENT
[FreeTextEntry1] : Mr. Andino is a 72 Y/O male patient S/P a CABG x 3 received pt in his living room patient appears generally well, ambulating w/o assistance and no distress noted

## 2019-09-16 ENCOUNTER — FORM ENCOUNTER (OUTPATIENT)
Age: 74
End: 2019-09-16

## 2019-09-16 DIAGNOSIS — R94.31 ABNORMAL ELECTROCARDIOGRAM [ECG] [EKG]: ICD-10-CM

## 2019-09-16 DIAGNOSIS — E78.5 HYPERLIPIDEMIA, UNSPECIFIED: ICD-10-CM

## 2019-09-16 DIAGNOSIS — J45.909 UNSPECIFIED ASTHMA, UNCOMPLICATED: ICD-10-CM

## 2019-09-16 DIAGNOSIS — E11.9 TYPE 2 DIABETES MELLITUS WITHOUT COMPLICATIONS: ICD-10-CM
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
## 2019-09-16 DIAGNOSIS — F14.11 COCAINE ABUSE, IN REMISSION: ICD-10-CM

## 2019-09-16 DIAGNOSIS — Z82.49 FAMILY HISTORY OF ISCHEMIC HEART DISEASE AND OTHER DISEASES OF THE CIRCULATORY SYSTEM: ICD-10-CM

## 2019-09-16 DIAGNOSIS — Z79.84 LONG TERM (CURRENT) USE OF ORAL HYPOGLYCEMIC DRUGS: ICD-10-CM

## 2019-09-16 DIAGNOSIS — Z79.82 LONG TERM (CURRENT) USE OF ASPIRIN: ICD-10-CM

## 2019-09-16 DIAGNOSIS — I25.10 ATHEROSCLEROTIC HEART DISEASE OF NATIVE CORONARY ARTERY WITHOUT ANGINA PECTORIS: ICD-10-CM

## 2019-09-16 DIAGNOSIS — Z96.651 PRESENCE OF RIGHT ARTIFICIAL KNEE JOINT: ICD-10-CM

## 2019-09-16 DIAGNOSIS — I11.0 HYPERTENSIVE HEART DISEASE WITH HEART FAILURE: ICD-10-CM

## 2019-09-16 DIAGNOSIS — D64.9 ANEMIA, UNSPECIFIED: ICD-10-CM

## 2019-09-16 DIAGNOSIS — F11.11 OPIOID ABUSE, IN REMISSION: ICD-10-CM

## 2019-09-16 DIAGNOSIS — I95.81 POSTPROCEDURAL HYPOTENSION: ICD-10-CM

## 2019-09-16 DIAGNOSIS — Z87.898 PERSONAL HISTORY OF OTHER SPECIFIED CONDITIONS: ICD-10-CM

## 2019-09-16 DIAGNOSIS — D62 ACUTE POSTHEMORRHAGIC ANEMIA: ICD-10-CM

## 2019-09-16 DIAGNOSIS — Z87.891 PERSONAL HISTORY OF NICOTINE DEPENDENCE: ICD-10-CM

## 2019-09-16 DIAGNOSIS — Z98.49 CATARACT EXTRACTION STATUS, UNSPECIFIED EYE: ICD-10-CM

## 2019-09-16 DIAGNOSIS — J90 PLEURAL EFFUSION, NOT ELSEWHERE CLASSIFIED: ICD-10-CM
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
## 2019-09-16 DIAGNOSIS — I50.42 CHRONIC COMBINED SYSTOLIC (CONGESTIVE) AND DIASTOLIC (CONGESTIVE) HEART FAILURE: ICD-10-CM

## 2019-09-17 ENCOUNTER — OUTPATIENT (OUTPATIENT)
Dept: OUTPATIENT SERVICES | Facility: HOSPITAL | Age: 74
LOS: 1 days | End: 2019-09-17
Payer: MEDICARE

## 2019-09-17 ENCOUNTER — APPOINTMENT (OUTPATIENT)
Dept: CARDIOTHORACIC SURGERY | Facility: CLINIC | Age: 74
End: 2019-09-17
Payer: MEDICARE

## 2019-09-17 VITALS
DIASTOLIC BLOOD PRESSURE: 60 MMHG | HEART RATE: 67 BPM | SYSTOLIC BLOOD PRESSURE: 131 MMHG | WEIGHT: 154 LBS | BODY MASS INDEX: 24.75 KG/M2 | HEIGHT: 66 IN | RESPIRATION RATE: 18 BRPM | OXYGEN SATURATION: 97 % | TEMPERATURE: 96.7 F

## 2019-09-17 DIAGNOSIS — Z98.890 OTHER SPECIFIED POSTPROCEDURAL STATES: Chronic | ICD-10-CM

## 2019-09-17 DIAGNOSIS — Z96.651 PRESENCE OF RIGHT ARTIFICIAL KNEE JOINT: Chronic | ICD-10-CM

## 2019-09-17 DIAGNOSIS — Z87.898 PERSONAL HISTORY OF OTHER SPECIFIED CONDITIONS: ICD-10-CM

## 2019-09-17 DIAGNOSIS — Z98.49 CATARACT EXTRACTION STATUS, UNSPECIFIED EYE: Chronic | ICD-10-CM

## 2019-09-17 DIAGNOSIS — I25.2 OLD MYOCARDIAL INFARCTION: ICD-10-CM

## 2019-09-17 DIAGNOSIS — Z87.891 PERSONAL HISTORY OF NICOTINE DEPENDENCE: ICD-10-CM

## 2019-09-17 DIAGNOSIS — Z82.49 FAMILY HISTORY OF ISCHEMIC HEART DISEASE AND OTHER DISEASES OF THE CIRCULATORY SYSTEM: ICD-10-CM

## 2019-09-17 PROCEDURE — 71046 X-RAY EXAM CHEST 2 VIEWS: CPT | Mod: 26

## 2019-09-17 PROCEDURE — 99024 POSTOP FOLLOW-UP VISIT: CPT

## 2019-09-17 PROCEDURE — 71046 X-RAY EXAM CHEST 2 VIEWS: CPT

## 2019-09-24 ENCOUNTER — APPOINTMENT (OUTPATIENT)
Dept: CARDIOTHORACIC SURGERY | Facility: CLINIC | Age: 74
End: 2019-09-24
Payer: MEDICARE

## 2019-09-25 ENCOUNTER — APPOINTMENT (OUTPATIENT)
Dept: HEART AND VASCULAR | Facility: CLINIC | Age: 74
End: 2019-09-25
Payer: MEDICARE

## 2019-09-25 VITALS
WEIGHT: 148.99 LBS | BODY MASS INDEX: 23.94 KG/M2 | DIASTOLIC BLOOD PRESSURE: 66 MMHG | SYSTOLIC BLOOD PRESSURE: 110 MMHG | HEIGHT: 66 IN | OXYGEN SATURATION: 99 % | TEMPERATURE: 97.8 F | HEART RATE: 61 BPM

## 2019-09-25 PROCEDURE — 93000 ELECTROCARDIOGRAM COMPLETE: CPT

## 2019-09-25 PROCEDURE — 99214 OFFICE O/P EST MOD 30 MIN: CPT

## 2019-09-25 RX ORDER — PANTOPRAZOLE SODIUM 40 MG/10ML
40 INJECTION, POWDER, FOR SOLUTION INTRAVENOUS DAILY
Refills: 0 | Status: DISCONTINUED | COMMUNITY
End: 2019-09-25

## 2019-09-25 RX ORDER — METOPROLOL SUCCINATE 25 MG/1
25 TABLET, EXTENDED RELEASE ORAL
Qty: 90 | Refills: 3 | Status: ACTIVE | COMMUNITY
Start: 2019-09-25 | End: 1900-01-01

## 2019-09-25 RX ORDER — METFORMIN HYDROCHLORIDE 500 MG/1
500 TABLET, EXTENDED RELEASE ORAL
Qty: 240 | Refills: 2 | Status: ACTIVE | COMMUNITY
Start: 1900-01-01 | End: 1900-01-01

## 2019-09-25 RX ORDER — FLUTICASONE FUROATE 27.5 UG/1
27.5 SPRAY, METERED NASAL DAILY
Refills: 0 | Status: ACTIVE | COMMUNITY

## 2019-09-25 RX ORDER — METOPROLOL TARTRATE 25 MG/1
25 TABLET, FILM COATED ORAL
Refills: 0 | Status: DISCONTINUED | COMMUNITY
End: 2019-09-25

## 2019-09-25 RX ORDER — ATORVASTATIN CALCIUM 80 MG/1
80 TABLET, FILM COATED ORAL
Qty: 90 | Refills: 2 | Status: ACTIVE | COMMUNITY
Start: 1900-01-01 | End: 1900-01-01

## 2019-09-25 NOTE — ASSESSMENT
[FreeTextEntry1] : cad and stemi on GMT resume lisinopril due to LV dysfunction change back to toprol xl 25 daily\par continue his meds for DM\par fu in one month\par will set up for cardiac rehab

## 2019-09-25 NOTE — PHYSICAL EXAM
[General Appearance - Well Developed] : well developed [Normal Appearance] : normal appearance [Well Groomed] : well groomed [General Appearance - Well Nourished] : well nourished [No Deformities] : no deformities [General Appearance - In No Acute Distress] : no acute distress [Normal Conjunctiva] : the conjunctiva exhibited no abnormalities [Eyelids - No Xanthelasma] : the eyelids demonstrated no xanthelasmas [No Oral Pallor] : no oral pallor [Normal Oral Mucosa] : normal oral mucosa [No Oral Cyanosis] : no oral cyanosis [Normal Jugular Venous A Waves Present] : normal jugular venous A waves present [Normal Jugular Venous V Waves Present] : normal jugular venous V waves present [No Jugular Venous Cummins A Waves] : no jugular venous cummins A waves [Respiration, Rhythm And Depth] : normal respiratory rhythm and effort [Exaggerated Use Of Accessory Muscles For Inspiration] : no accessory muscle use [Auscultation Breath Sounds / Voice Sounds] : lungs were clear to auscultation bilaterally [Heart Rate And Rhythm] : heart rate and rhythm were normal [Heart Sounds] : normal S1 and S2 [Murmurs] : no murmurs present [Abdomen Soft] : soft [Abdomen Tenderness] : non-tender [Abdomen Mass (___ Cm)] : no abdominal mass palpated [Abnormal Walk] : normal gait [Gait - Sufficient For Exercise Testing] : the gait was sufficient for exercise testing [Nail Clubbing] : no clubbing of the fingernails [Cyanosis, Localized] : no localized cyanosis [Petechial Hemorrhages (___cm)] : no petechial hemorrhages [Skin Color & Pigmentation] : normal skin color and pigmentation [No Venous Stasis] : no venous stasis [] : no rash [Skin Lesions] : no skin lesions [No Skin Ulcers] : no skin ulcer [No Xanthoma] : no  xanthoma was observed [Oriented To Time, Place, And Person] : oriented to person, place, and time [Affect] : the affect was normal [Mood] : the mood was normal [No Anxiety] : not feeling anxious

## 2019-09-25 NOTE — HISTORY OF PRESENT ILLNESS
[FreeTextEntry1] : 73 M DM referred by Dr. Alvarez for chest pain likely had inferior wall 8/2019 MI EF 40% now status post CABG feels ok still adjusting but is walking \par \par ecg nsr inferior infarct age indeterminate  anterior t wave changes

## 2019-09-26 LAB
BASOPHILS # BLD AUTO: 0.08 K/UL
BASOPHILS NFR BLD AUTO: 0.8 %
EOSINOPHIL # BLD AUTO: 0.96 K/UL
EOSINOPHIL NFR BLD AUTO: 10.1 %
FERRITIN SERPL-MCNC: 72 NG/ML
HCT VFR BLD CALC: 37.9 %
HGB BLD-MCNC: 11.1 G/DL
IMM GRANULOCYTES NFR BLD AUTO: 0.2 %
IRON SATN MFR SERPL: 12 %
IRON SERPL-MCNC: 39 UG/DL
LYMPHOCYTES # BLD AUTO: 1.93 K/UL
LYMPHOCYTES NFR BLD AUTO: 20.3 %
MAN DIFF?: NORMAL
MCHC RBC-ENTMCNC: 29.3 GM/DL
MCHC RBC-ENTMCNC: 31 PG
MCV RBC AUTO: 105.9 FL
MONOCYTES # BLD AUTO: 0.73 K/UL
MONOCYTES NFR BLD AUTO: 7.7 %
NEUTROPHILS # BLD AUTO: 5.77 K/UL
NEUTROPHILS NFR BLD AUTO: 60.9 %
PLATELET # BLD AUTO: 410 K/UL
RBC # BLD: 3.58 M/UL
RBC # FLD: 14 %
TIBC SERPL-MCNC: 318 UG/DL
UIBC SERPL-MCNC: 279 UG/DL
WBC # FLD AUTO: 9.49 K/UL

## 2019-09-27 ENCOUNTER — APPOINTMENT (OUTPATIENT)
Dept: ENDOCRINOLOGY | Facility: CLINIC | Age: 74
End: 2019-09-27

## 2019-10-01 ENCOUNTER — APPOINTMENT (OUTPATIENT)
Dept: CARDIOTHORACIC SURGERY | Facility: CLINIC | Age: 74
End: 2019-10-01
Payer: MEDICARE

## 2019-10-01 VITALS
RESPIRATION RATE: 18 BRPM | OXYGEN SATURATION: 98 % | TEMPERATURE: 96.7 F | DIASTOLIC BLOOD PRESSURE: 58 MMHG | HEART RATE: 87 BPM | HEIGHT: 66 IN | SYSTOLIC BLOOD PRESSURE: 122 MMHG | BODY MASS INDEX: 23.78 KG/M2 | WEIGHT: 148 LBS

## 2019-10-01 PROCEDURE — 99024 POSTOP FOLLOW-UP VISIT: CPT

## 2019-10-03 PROCEDURE — C1889: CPT

## 2019-10-03 PROCEDURE — 71045 X-RAY EXAM CHEST 1 VIEW: CPT

## 2019-10-03 PROCEDURE — 86850 RBC ANTIBODY SCREEN: CPT

## 2019-10-03 PROCEDURE — 83735 ASSAY OF MAGNESIUM: CPT

## 2019-10-03 PROCEDURE — 82248 BILIRUBIN DIRECT: CPT

## 2019-10-03 PROCEDURE — 94640 AIRWAY INHALATION TREATMENT: CPT

## 2019-10-03 PROCEDURE — 80307 DRUG TEST PRSMV CHEM ANLYZR: CPT

## 2019-10-03 PROCEDURE — 86140 C-REACTIVE PROTEIN: CPT

## 2019-10-03 PROCEDURE — 80048 BASIC METABOLIC PNL TOTAL CA: CPT

## 2019-10-03 PROCEDURE — 82803 BLOOD GASES ANY COMBINATION: CPT

## 2019-10-03 PROCEDURE — P9045: CPT

## 2019-10-03 PROCEDURE — 83605 ASSAY OF LACTIC ACID: CPT

## 2019-10-03 PROCEDURE — 94002 VENT MGMT INPAT INIT DAY: CPT

## 2019-10-03 PROCEDURE — 80053 COMPREHEN METABOLIC PANEL: CPT

## 2019-10-03 PROCEDURE — 80061 LIPID PANEL: CPT

## 2019-10-03 PROCEDURE — 86923 COMPATIBILITY TEST ELECTRIC: CPT

## 2019-10-03 PROCEDURE — 82553 CREATINE MB FRACTION: CPT

## 2019-10-03 PROCEDURE — 93880 EXTRACRANIAL BILAT STUDY: CPT

## 2019-10-03 PROCEDURE — 90686 IIV4 VACC NO PRSV 0.5 ML IM: CPT

## 2019-10-03 PROCEDURE — 84100 ASSAY OF PHOSPHORUS: CPT

## 2019-10-03 PROCEDURE — 81001 URINALYSIS AUTO W/SCOPE: CPT

## 2019-10-03 PROCEDURE — 71046 X-RAY EXAM CHEST 2 VIEWS: CPT

## 2019-10-03 PROCEDURE — 82962 GLUCOSE BLOOD TEST: CPT

## 2019-10-03 PROCEDURE — C1769: CPT

## 2019-10-03 PROCEDURE — 85025 COMPLETE CBC W/AUTO DIFF WBC: CPT

## 2019-10-03 PROCEDURE — 84443 ASSAY THYROID STIM HORMONE: CPT

## 2019-10-03 PROCEDURE — 85027 COMPLETE CBC AUTOMATED: CPT

## 2019-10-03 PROCEDURE — 82550 ASSAY OF CK (CPK): CPT

## 2019-10-03 PROCEDURE — 86900 BLOOD TYPING SEROLOGIC ABO: CPT

## 2019-10-03 PROCEDURE — 86901 BLOOD TYPING SEROLOGIC RH(D): CPT

## 2019-10-03 PROCEDURE — 97162 PT EVAL MOD COMPLEX 30 MIN: CPT

## 2019-10-03 PROCEDURE — 97116 GAIT TRAINING THERAPY: CPT

## 2019-10-03 PROCEDURE — 84132 ASSAY OF SERUM POTASSIUM: CPT

## 2019-10-03 PROCEDURE — 82330 ASSAY OF CALCIUM: CPT

## 2019-10-03 PROCEDURE — 94150 VITAL CAPACITY TEST: CPT

## 2019-10-03 PROCEDURE — 93005 ELECTROCARDIOGRAM TRACING: CPT

## 2019-10-03 PROCEDURE — 85610 PROTHROMBIN TIME: CPT

## 2019-10-03 PROCEDURE — C1887: CPT

## 2019-10-03 PROCEDURE — 84295 ASSAY OF SERUM SODIUM: CPT

## 2019-10-03 PROCEDURE — 85730 THROMBOPLASTIN TIME PARTIAL: CPT

## 2019-10-03 PROCEDURE — 83036 HEMOGLOBIN GLYCOSYLATED A1C: CPT

## 2019-10-03 PROCEDURE — 36415 COLL VENOUS BLD VENIPUNCTURE: CPT

## 2019-10-03 PROCEDURE — 80076 HEPATIC FUNCTION PANEL: CPT

## 2019-10-16 ENCOUNTER — APPOINTMENT (OUTPATIENT)
Dept: HEART AND VASCULAR | Facility: CLINIC | Age: 74
End: 2019-10-16
Payer: MEDICARE

## 2019-10-16 VITALS
WEIGHT: 147.18 LBS | SYSTOLIC BLOOD PRESSURE: 106 MMHG | DIASTOLIC BLOOD PRESSURE: 68 MMHG | BODY MASS INDEX: 23.65 KG/M2 | HEIGHT: 66 IN | TEMPERATURE: 97.7 F | HEART RATE: 90 BPM | OXYGEN SATURATION: 98 %

## 2019-10-16 PROCEDURE — 99214 OFFICE O/P EST MOD 30 MIN: CPT

## 2019-10-16 PROCEDURE — 93000 ELECTROCARDIOGRAM COMPLETE: CPT

## 2019-10-16 NOTE — HISTORY OF PRESENT ILLNESS
[FreeTextEntry1] : 74 M DM referred by Dr. Alvarez for chest pain likely had inferior wall 8/2019 MI EF 40% now status post CABG feels ok still adjusting but is walking one episode of nausea this am with vomiting no lightheadedness no dizziness otherwise feels ok \par \par ecg nsr inferior infarct age indeterminate  anterior t wave changes

## 2019-10-16 NOTE — PHYSICAL EXAM
[General Appearance - Well Developed] : well developed [Normal Appearance] : normal appearance [General Appearance - Well Nourished] : well nourished [Well Groomed] : well groomed [General Appearance - In No Acute Distress] : no acute distress [No Deformities] : no deformities [Eyelids - No Xanthelasma] : the eyelids demonstrated no xanthelasmas [Normal Conjunctiva] : the conjunctiva exhibited no abnormalities [No Oral Cyanosis] : no oral cyanosis [Normal Oral Mucosa] : normal oral mucosa [No Oral Pallor] : no oral pallor [Normal Jugular Venous A Waves Present] : normal jugular venous A waves present [Normal Jugular Venous V Waves Present] : normal jugular venous V waves present [No Jugular Venous Cummins A Waves] : no jugular venous cummins A waves [Auscultation Breath Sounds / Voice Sounds] : lungs were clear to auscultation bilaterally [Respiration, Rhythm And Depth] : normal respiratory rhythm and effort [Exaggerated Use Of Accessory Muscles For Inspiration] : no accessory muscle use [Heart Rate And Rhythm] : heart rate and rhythm were normal [Heart Sounds] : normal S1 and S2 [Abdomen Soft] : soft [Murmurs] : no murmurs present [Abdomen Tenderness] : non-tender [Abdomen Mass (___ Cm)] : no abdominal mass palpated [Gait - Sufficient For Exercise Testing] : the gait was sufficient for exercise testing [Abnormal Walk] : normal gait [Nail Clubbing] : no clubbing of the fingernails [Cyanosis, Localized] : no localized cyanosis [Petechial Hemorrhages (___cm)] : no petechial hemorrhages [Skin Color & Pigmentation] : normal skin color and pigmentation [] : no rash [No Venous Stasis] : no venous stasis [Skin Lesions] : no skin lesions [No Skin Ulcers] : no skin ulcer [No Xanthoma] : no  xanthoma was observed [Oriented To Time, Place, And Person] : oriented to person, place, and time [Affect] : the affect was normal [Mood] : the mood was normal [No Anxiety] : not feeling anxious

## 2019-10-16 NOTE — ASSESSMENT
[FreeTextEntry1] : cad and stemi on GMT told to stagger his medications no orthostasis on exam \par continue his meds for DM\par fu in three months will repeat echo then to ensure that his EF has normalized then\par will set up for cardiac rehab

## 2019-10-17 LAB
ANION GAP SERPL CALC-SCNC: 14 MMOL/L
BUN SERPL-MCNC: 18 MG/DL
CALCIUM SERPL-MCNC: 9.6 MG/DL
CHLORIDE SERPL-SCNC: 102 MMOL/L
CO2 SERPL-SCNC: 22 MMOL/L
CREAT SERPL-MCNC: 0.88 MG/DL
GLUCOSE SERPL-MCNC: 132 MG/DL
NT-PROBNP SERPL-MCNC: 1137 PG/ML
POTASSIUM SERPL-SCNC: 4.5 MMOL/L
SODIUM SERPL-SCNC: 138 MMOL/L

## 2020-01-22 ENCOUNTER — APPOINTMENT (OUTPATIENT)
Dept: HEART AND VASCULAR | Facility: CLINIC | Age: 75
End: 2020-01-22
Payer: MEDICARE

## 2020-01-22 VITALS
TEMPERATURE: 97.7 F | DIASTOLIC BLOOD PRESSURE: 64 MMHG | BODY MASS INDEX: 23.62 KG/M2 | OXYGEN SATURATION: 96 % | HEART RATE: 80 BPM | WEIGHT: 146.98 LBS | SYSTOLIC BLOOD PRESSURE: 100 MMHG | HEIGHT: 66 IN

## 2020-01-22 PROCEDURE — 93306 TTE W/DOPPLER COMPLETE: CPT

## 2020-01-22 PROCEDURE — 99214 OFFICE O/P EST MOD 30 MIN: CPT

## 2020-01-22 NOTE — ASSESSMENT
[FreeTextEntry1] : cad and stemi on GMT \par continue his meds for DM\par fu in four months\par will set up for cardiac rehab again

## 2020-01-22 NOTE — PHYSICAL EXAM
[Normal Appearance] : normal appearance [General Appearance - Well Developed] : well developed [Well Groomed] : well groomed [General Appearance - Well Nourished] : well nourished [No Deformities] : no deformities [General Appearance - In No Acute Distress] : no acute distress [Normal Conjunctiva] : the conjunctiva exhibited no abnormalities [Normal Oral Mucosa] : normal oral mucosa [Eyelids - No Xanthelasma] : the eyelids demonstrated no xanthelasmas [No Oral Pallor] : no oral pallor [No Oral Cyanosis] : no oral cyanosis [Normal Jugular Venous V Waves Present] : normal jugular venous V waves present [Normal Jugular Venous A Waves Present] : normal jugular venous A waves present [No Jugular Venous Cummins A Waves] : no jugular venous cummins A waves [Respiration, Rhythm And Depth] : normal respiratory rhythm and effort [Exaggerated Use Of Accessory Muscles For Inspiration] : no accessory muscle use [Heart Sounds] : normal S1 and S2 [Auscultation Breath Sounds / Voice Sounds] : lungs were clear to auscultation bilaterally [Heart Rate And Rhythm] : heart rate and rhythm were normal [Abdomen Soft] : soft [Murmurs] : no murmurs present [Abdomen Mass (___ Cm)] : no abdominal mass palpated [Abdomen Tenderness] : non-tender [Abnormal Walk] : normal gait [Gait - Sufficient For Exercise Testing] : the gait was sufficient for exercise testing [Nail Clubbing] : no clubbing of the fingernails [Cyanosis, Localized] : no localized cyanosis [Petechial Hemorrhages (___cm)] : no petechial hemorrhages [Skin Color & Pigmentation] : normal skin color and pigmentation [] : no rash [No Venous Stasis] : no venous stasis [Skin Lesions] : no skin lesions [No Skin Ulcers] : no skin ulcer [Oriented To Time, Place, And Person] : oriented to person, place, and time [No Xanthoma] : no  xanthoma was observed [No Anxiety] : not feeling anxious [Affect] : the affect was normal [Mood] : the mood was normal

## 2020-01-22 NOTE — HISTORY OF PRESENT ILLNESS
[FreeTextEntry1] : 74 M DM referred by Dr. 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

## 2020-01-23 LAB
ANION GAP SERPL CALC-SCNC: 14 MMOL/L
BUN SERPL-MCNC: 20 MG/DL
CALCIUM SERPL-MCNC: 9.8 MG/DL
CHLORIDE SERPL-SCNC: 103 MMOL/L
CHOLEST SERPL-MCNC: 107 MG/DL
CHOLEST/HDLC SERPL: 2.5 RATIO
CO2 SERPL-SCNC: 24 MMOL/L
CREAT SERPL-MCNC: 0.91 MG/DL
GLUCOSE SERPL-MCNC: 154 MG/DL
HDLC SERPL-MCNC: 43 MG/DL
LDLC SERPL CALC-MCNC: 40 MG/DL
NT-PROBNP SERPL-MCNC: 599 PG/ML
POTASSIUM SERPL-SCNC: 4.2 MMOL/L
SODIUM SERPL-SCNC: 141 MMOL/L
TRIGL SERPL-MCNC: 121 MG/DL

## 2020-06-03 ENCOUNTER — NON-APPOINTMENT (OUTPATIENT)
Age: 75
End: 2020-06-03

## 2020-06-03 ENCOUNTER — APPOINTMENT (OUTPATIENT)
Dept: HEART AND VASCULAR | Facility: CLINIC | Age: 75
End: 2020-06-03
Payer: MEDICARE

## 2020-06-03 VITALS
HEIGHT: 66 IN | SYSTOLIC BLOOD PRESSURE: 126 MMHG | BODY MASS INDEX: 24.16 KG/M2 | OXYGEN SATURATION: 96 % | TEMPERATURE: 98.7 F | WEIGHT: 150.31 LBS | HEART RATE: 76 BPM | DIASTOLIC BLOOD PRESSURE: 60 MMHG

## 2020-06-03 PROCEDURE — 99214 OFFICE O/P EST MOD 30 MIN: CPT

## 2020-06-03 PROCEDURE — 36415 COLL VENOUS BLD VENIPUNCTURE: CPT

## 2020-06-03 PROCEDURE — 93000 ELECTROCARDIOGRAM COMPLETE: CPT

## 2020-06-03 NOTE — PHYSICAL EXAM
[General Appearance - Well Developed] : well developed [Well Groomed] : well groomed [Normal Appearance] : normal appearance [General Appearance - Well Nourished] : well nourished [No Deformities] : no deformities [General Appearance - In No Acute Distress] : no acute distress [Normal Conjunctiva] : the conjunctiva exhibited no abnormalities [Normal Oral Mucosa] : normal oral mucosa [Eyelids - No Xanthelasma] : the eyelids demonstrated no xanthelasmas [No Oral Pallor] : no oral pallor [No Oral Cyanosis] : no oral cyanosis [Normal Jugular Venous A Waves Present] : normal jugular venous A waves present [Normal Jugular Venous V Waves Present] : normal jugular venous V waves present [No Jugular Venous Cummins A Waves] : no jugular venous cummins A waves [Respiration, Rhythm And Depth] : normal respiratory rhythm and effort [Exaggerated Use Of Accessory Muscles For Inspiration] : no accessory muscle use [Auscultation Breath Sounds / Voice Sounds] : lungs were clear to auscultation bilaterally [Heart Rate And Rhythm] : heart rate and rhythm were normal [Heart Sounds] : normal S1 and S2 [Murmurs] : no murmurs present [Abdomen Tenderness] : non-tender [Abdomen Soft] : soft [Abdomen Mass (___ Cm)] : no abdominal mass palpated [Gait - Sufficient For Exercise Testing] : the gait was sufficient for exercise testing [Abnormal Walk] : normal gait [Nail Clubbing] : no clubbing of the fingernails [Cyanosis, Localized] : no localized cyanosis [Petechial Hemorrhages (___cm)] : no petechial hemorrhages [Skin Color & Pigmentation] : normal skin color and pigmentation [No Venous Stasis] : no venous stasis [] : no rash [Skin Lesions] : no skin lesions [No Skin Ulcers] : no skin ulcer [No Xanthoma] : no  xanthoma was observed [Oriented To Time, Place, And Person] : oriented to person, place, and time [No Anxiety] : not feeling anxious [Mood] : the mood was normal [Affect] : the affect was normal

## 2020-06-04 LAB
ALBUMIN SERPL ELPH-MCNC: 4.6 G/DL
ALP BLD-CCNC: 65 U/L
ALT SERPL-CCNC: 14 U/L
ANION GAP SERPL CALC-SCNC: 15 MMOL/L
AST SERPL-CCNC: 23 U/L
BASOPHILS # BLD AUTO: 0.03 K/UL
BASOPHILS NFR BLD AUTO: 0.4 %
BILIRUB SERPL-MCNC: 0.3 MG/DL
BUN SERPL-MCNC: 15 MG/DL
CALCIUM SERPL-MCNC: 9.6 MG/DL
CHLORIDE SERPL-SCNC: 102 MMOL/L
CHOLEST SERPL-MCNC: 103 MG/DL
CHOLEST/HDLC SERPL: 2.5 RATIO
CO2 SERPL-SCNC: 23 MMOL/L
CREAT SERPL-MCNC: 0.97 MG/DL
EOSINOPHIL # BLD AUTO: 0.21 K/UL
EOSINOPHIL NFR BLD AUTO: 2.5 %
GLUCOSE SERPL-MCNC: 115 MG/DL
HCT VFR BLD CALC: 39.3 %
HDLC SERPL-MCNC: 42 MG/DL
HGB BLD-MCNC: 12.2 G/DL
IMM GRANULOCYTES NFR BLD AUTO: 0.2 %
LDLC SERPL CALC-MCNC: 46 MG/DL
LYMPHOCYTES # BLD AUTO: 1.85 K/UL
LYMPHOCYTES NFR BLD AUTO: 22.3 %
MAN DIFF?: NORMAL
MCHC RBC-ENTMCNC: 27.9 PG
MCHC RBC-ENTMCNC: 31 GM/DL
MCV RBC AUTO: 89.9 FL
MONOCYTES # BLD AUTO: 0.83 K/UL
MONOCYTES NFR BLD AUTO: 10 %
NEUTROPHILS # BLD AUTO: 5.34 K/UL
NEUTROPHILS NFR BLD AUTO: 64.6 %
NT-PROBNP SERPL-MCNC: 599 PG/ML
PLATELET # BLD AUTO: 251 K/UL
POTASSIUM SERPL-SCNC: 4.6 MMOL/L
PROT SERPL-MCNC: 7.6 G/DL
RBC # BLD: 4.37 M/UL
RBC # FLD: 16.7 %
SODIUM SERPL-SCNC: 140 MMOL/L
TRIGL SERPL-MCNC: 73 MG/DL
WBC # FLD AUTO: 8.28 K/UL

## 2020-06-05 LAB
ESTIMATED AVERAGE GLUCOSE: 154 MG/DL
HBA1C MFR BLD HPLC: 7 %
SARS-COV-2 IGG SERPL IA-ACNC: <0.1 INDEX
SARS-COV-2 IGG SERPL QL IA: NEGATIVE

## 2020-09-23 ENCOUNTER — APPOINTMENT (OUTPATIENT)
Dept: HEART AND VASCULAR | Facility: CLINIC | Age: 75
End: 2020-09-23
Payer: MEDICARE

## 2020-09-23 VITALS
WEIGHT: 151.99 LBS | HEIGHT: 66 IN | HEART RATE: 74 BPM | TEMPERATURE: 97.7 F | OXYGEN SATURATION: 96 % | DIASTOLIC BLOOD PRESSURE: 60 MMHG | SYSTOLIC BLOOD PRESSURE: 120 MMHG | BODY MASS INDEX: 24.43 KG/M2

## 2020-09-23 PROCEDURE — 99214 OFFICE O/P EST MOD 30 MIN: CPT

## 2020-09-23 RX ORDER — VITS A,C,E/LUTEIN/MINERALS 300MCG-200
TABLET ORAL DAILY
Refills: 0 | Status: ACTIVE | COMMUNITY

## 2020-09-23 NOTE — PHYSICAL EXAM
[General Appearance - Well Developed] : well developed [Normal Appearance] : normal appearance [Well Groomed] : well groomed [General Appearance - Well Nourished] : well nourished [No Deformities] : no deformities [General Appearance - In No Acute Distress] : no acute distress [Normal Conjunctiva] : the conjunctiva exhibited no abnormalities [Eyelids - No Xanthelasma] : the eyelids demonstrated no xanthelasmas [Normal Oral Mucosa] : normal oral mucosa [No Oral Pallor] : no oral pallor [No Oral Cyanosis] : no oral cyanosis [Normal Jugular Venous A Waves Present] : normal jugular venous A waves present [Normal Jugular Venous V Waves Present] : normal jugular venous V waves present [No Jugular Venous Cummins A Waves] : no jugular venous cummins A waves [Respiration, Rhythm And Depth] : normal respiratory rhythm and effort [Exaggerated Use Of Accessory Muscles For Inspiration] : no accessory muscle use [Auscultation Breath Sounds / Voice Sounds] : lungs were clear to auscultation bilaterally [Heart Rate And Rhythm] : heart rate and rhythm were normal [Heart Sounds] : normal S1 and S2 [Murmurs] : no murmurs present [Abdomen Soft] : soft [Abdomen Tenderness] : non-tender [Abdomen Mass (___ Cm)] : no abdominal mass palpated [Abnormal Walk] : normal gait [Gait - Sufficient For Exercise Testing] : the gait was sufficient for exercise testing [Nail Clubbing] : no clubbing of the fingernails [Cyanosis, Localized] : no localized cyanosis [Petechial Hemorrhages (___cm)] : no petechial hemorrhages [Skin Color & Pigmentation] : normal skin color and pigmentation [] : no rash [No Venous Stasis] : no venous stasis [Skin Lesions] : no skin lesions [No Skin Ulcers] : no skin ulcer [No Xanthoma] : no  xanthoma was observed [Oriented To Time, Place, And Person] : oriented to person, place, and time [Affect] : the affect was normal [Mood] : the mood was normal [No Anxiety] : not feeling anxious

## 2020-09-23 NOTE — ASSESSMENT
[FreeTextEntry1] : cad and stemi on GMT \par continue his meds for DM i offerd to switch januvia to jardiance he will think about it as i explained risk of uti and fungal infections \par fu in 3 months\par

## 2020-09-23 NOTE — HISTORY OF PRESENT ILLNESS
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

## 2020-11-20 ENCOUNTER — APPOINTMENT (OUTPATIENT)
Dept: HEART AND VASCULAR | Facility: CLINIC | Age: 75
End: 2020-11-20
Payer: MEDICARE

## 2020-11-20 DIAGNOSIS — Z95.1 PRESENCE OF AORTOCORONARY BYPASS GRAFT: ICD-10-CM

## 2020-11-20 PROCEDURE — 93015 CV STRESS TEST SUPVJ I&R: CPT

## 2020-11-20 PROCEDURE — ZZZZZ: CPT

## 2020-11-23 PROBLEM — Z95.1 S/P CABG X 3: Status: ACTIVE | Noted: 2019-10-01

## 2020-12-07 ENCOUNTER — RX RENEWAL (OUTPATIENT)
Age: 75
End: 2020-12-07

## 2020-12-24 ENCOUNTER — RX RENEWAL (OUTPATIENT)
Age: 75
End: 2020-12-24

## 2021-03-11 ENCOUNTER — APPOINTMENT (OUTPATIENT)
Dept: HEART AND VASCULAR | Facility: CLINIC | Age: 76
End: 2021-03-11
Payer: MEDICARE

## 2021-03-11 VITALS
DIASTOLIC BLOOD PRESSURE: 68 MMHG | TEMPERATURE: 97.8 F | OXYGEN SATURATION: 97 % | WEIGHT: 154.98 LBS | HEART RATE: 68 BPM | BODY MASS INDEX: 24.91 KG/M2 | SYSTOLIC BLOOD PRESSURE: 121 MMHG | HEIGHT: 66 IN

## 2021-03-11 DIAGNOSIS — E11.9 TYPE 2 DIABETES MELLITUS W/OUT COMPLICATIONS: ICD-10-CM

## 2021-03-11 PROCEDURE — 99214 OFFICE O/P EST MOD 30 MIN: CPT

## 2021-03-11 PROCEDURE — 99072 ADDL SUPL MATRL&STAF TM PHE: CPT

## 2021-03-11 RX ORDER — METFORMIN ER 500 MG 500 MG/1
500 TABLET ORAL
Qty: 180 | Refills: 0 | Status: DISCONTINUED | COMMUNITY
Start: 2019-10-04 | End: 2021-03-11

## 2021-03-11 NOTE — HISTORY OF PRESENT ILLNESS
[FreeTextEntry1] : 75 M DM H/o  inferior wall 8/2019 MI EF 40%, s/p CABG echo in 1/2020 EF is normal feels ok still adjusting  EF now normal feels good no complaints \par \par ecg nsr inferior infarct age indeterminate  anterior t wave changes

## 2021-03-11 NOTE — ASSESSMENT
[FreeTextEntry1] : cad and stemi on GMT \par DM his a1c not at [employer] stop januvia start farxiga 5 mg once a day \par HTN is controlled \par fu in 3 months\par

## 2021-03-11 NOTE — PROCEDURE
[Normal] : Normal ECG response to treadmill exercise. [ECG Atrial Arrhythmias] : no arrhythmias [de-identified] : Tom [de-identified] : Cardiac rehab post cabg [de-identified] : 64 [de-identified] : 120/60 [de-identified] : 3:00 [de-identified] : 111 [de-identified] : 150/60 [de-identified] : 4.7 METS; 77 [de-identified] : Poor exercise tolerance [TWNoteComboBox1] : ROYAL [TWNoteComboBox2] : Yosef [TWNoteComboBox4] : fatigue [TWNoteComboBox3] : 1 [TWNoteComboBox5] : no chest pain

## 2021-06-10 ENCOUNTER — APPOINTMENT (OUTPATIENT)
Dept: HEART AND VASCULAR | Facility: CLINIC | Age: 76
End: 2021-06-10
Payer: MEDICARE

## 2021-06-10 ENCOUNTER — NON-APPOINTMENT (OUTPATIENT)
Age: 76
End: 2021-06-10

## 2021-06-10 VITALS
HEIGHT: 66 IN | BODY MASS INDEX: 24.75 KG/M2 | HEART RATE: 78 BPM | SYSTOLIC BLOOD PRESSURE: 138 MMHG | TEMPERATURE: 97.9 F | OXYGEN SATURATION: 97 % | DIASTOLIC BLOOD PRESSURE: 67 MMHG | WEIGHT: 153.99 LBS

## 2021-06-10 PROCEDURE — 93000 ELECTROCARDIOGRAM COMPLETE: CPT

## 2021-06-10 PROCEDURE — 99214 OFFICE O/P EST MOD 30 MIN: CPT

## 2021-06-10 PROCEDURE — 99072 ADDL SUPL MATRL&STAF TM PHE: CPT

## 2021-06-10 RX ORDER — SITAGLIPTIN 100 MG/1
100 TABLET, FILM COATED ORAL DAILY
Qty: 90 | Refills: 2 | Status: DISCONTINUED | COMMUNITY
End: 2021-06-10

## 2021-06-10 RX ORDER — CLOPIDOGREL BISULFATE 75 MG/1
75 TABLET, FILM COATED ORAL DAILY
Qty: 1 | Refills: 3 | Status: DISCONTINUED | COMMUNITY
End: 2021-06-10

## 2021-06-10 NOTE — ASSESSMENT
[FreeTextEntry1] : cad and stemi on GMT stop clopidogrel \par DM perfect stop januvia stay on farxiga and metformin\par HTN is controlled \par fu in 4 months\par

## 2021-06-10 NOTE — PROCEDURE
[Normal] : Normal ECG response to treadmill exercise. [ECG Atrial Arrhythmias] : no arrhythmias [de-identified] : Tom [de-identified] : Cardiac rehab post cabg [de-identified] : 64 [de-identified] : 120/60 [de-identified] : 3:00 [de-identified] : 111 [de-identified] : 150/60 [de-identified] : 4.7 METS; 77 [de-identified] : Poor exercise tolerance [TWNoteComboBox1] : ROYAL [TWNoteComboBox2] : Yosef [TWNoteComboBox4] : fatigue [TWNoteComboBox3] : 1 [TWNoteComboBox5] : no chest pain

## 2021-06-10 NOTE — REASON FOR VISIT
[Coronary Artery Disease] : coronary artery disease [Follow-Up - Clinic] : a clinic follow-up of [FreeTextEntry2] : n[facility]

## 2021-10-14 ENCOUNTER — APPOINTMENT (OUTPATIENT)
Dept: HEART AND VASCULAR | Facility: CLINIC | Age: 76
End: 2021-10-14
Payer: MEDICARE

## 2021-10-14 VITALS
HEIGHT: 66 IN | WEIGHT: 152.3 LBS | HEART RATE: 70 BPM | OXYGEN SATURATION: 96 % | DIASTOLIC BLOOD PRESSURE: 66 MMHG | TEMPERATURE: 97.2 F | BODY MASS INDEX: 24.48 KG/M2 | SYSTOLIC BLOOD PRESSURE: 105 MMHG

## 2021-10-14 DIAGNOSIS — E61.1 IRON DEFICIENCY: ICD-10-CM

## 2021-10-14 DIAGNOSIS — I21.19 ST ELEVATION (STEMI) MYOCARDIAL INFARCTION INVOLVING OTHER CORONARY ARTERY OF INFERIOR WALL: ICD-10-CM

## 2021-10-14 PROCEDURE — 99214 OFFICE O/P EST MOD 30 MIN: CPT

## 2021-10-14 NOTE — PROCEDURE
[Normal] : Normal ECG response to treadmill exercise. [ECG Atrial Arrhythmias] : no arrhythmias [de-identified] : Tom [de-identified] : Cardiac rehab post cabg [de-identified] : 64 [de-identified] : 120/60 [de-identified] : 3:00 [de-identified] : 111 [de-identified] : 150/60 [de-identified] : 4.7 METS; 77 [de-identified] : Poor exercise tolerance [TWNoteComboBox1] : ROYAL [TWNoteComboBox2] : Yosef [TWNoteComboBox4] : fatigue [TWNoteComboBox3] : 1 [TWNoteComboBox5] : no chest pain

## 2021-10-14 NOTE — PHYSICAL EXAM
[Well Developed] : well developed [Well Nourished] : well nourished [No Acute Distress] : no acute distress [Normal Venous Pressure] : normal venous pressure [No Carotid Bruit] : no carotid bruit [Normal S1, S2] : normal S1, S2 [No Murmur] : no murmur [No Rub] : no rub [No Gallop] : no gallop [Clear Lung Fields] : clear lung fields [Good Air Entry] : good air entry [No Respiratory Distress] : no respiratory distress  [Soft] : abdomen soft [Non Tender] : non-tender [No Masses/organomegaly] : no masses/organomegaly [Normal Bowel Sounds] : normal bowel sounds [Normal Gait] : normal gait [No Edema] : no edema [No Cyanosis] : no cyanosis [No Clubbing] : no clubbing [No Varicosities] : no varicosities [No Rash] : no rash [No Skin Lesions] : no skin lesions [Moves all extremities] : moves all extremities [No Focal Deficits] : no focal deficits [Normal Speech] : normal speech [Alert and Oriented] : alert and oriented [Normal memory] : normal memory [General Appearance - Well Developed] : well developed [Normal Appearance] : normal appearance [Well Groomed] : well groomed [General Appearance - Well Nourished] : well nourished [No Deformities] : no deformities [General Appearance - In No Acute Distress] : no acute distress [Normal Conjunctiva] : the conjunctiva exhibited no abnormalities [Eyelids - No Xanthelasma] : the eyelids demonstrated no xanthelasmas [Normal Oral Mucosa] : normal oral mucosa [No Oral Pallor] : no oral pallor [No Oral Cyanosis] : no oral cyanosis [Normal Jugular Venous A Waves Present] : normal jugular venous A waves present [Normal Jugular Venous V Waves Present] : normal jugular venous V waves present [No Jugular Venous Cummins A Waves] : no jugular venous cummins A waves [Respiration, Rhythm And Depth] : normal respiratory rhythm and effort [Exaggerated Use Of Accessory Muscles For Inspiration] : no accessory muscle use [Auscultation Breath Sounds / Voice Sounds] : lungs were clear to auscultation bilaterally [Heart Rate And Rhythm] : heart rate and rhythm were normal [Heart Sounds] : normal S1 and S2 [Murmurs] : no murmurs present [Abdomen Soft] : soft [Abdomen Tenderness] : non-tender [Abdomen Mass (___ Cm)] : no abdominal mass palpated [Abnormal Walk] : normal gait [Gait - Sufficient For Exercise Testing] : the gait was sufficient for exercise testing [Nail Clubbing] : no clubbing of the fingernails [Cyanosis, Localized] : no localized cyanosis [Petechial Hemorrhages (___cm)] : no petechial hemorrhages [Skin Color & Pigmentation] : normal skin color and pigmentation [] : no rash [No Venous Stasis] : no venous stasis [Skin Lesions] : no skin lesions [No Skin Ulcers] : no skin ulcer [No Xanthoma] : no  xanthoma was observed [Oriented To Time, Place, And Person] : oriented to person, place, and time [Affect] : the affect was normal [Mood] : the mood was normal [No Anxiety] : not feeling anxious

## 2021-10-14 NOTE — HISTORY OF PRESENT ILLNESS
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

## 2021-10-14 NOTE — ASSESSMENT
[FreeTextEntry1] : cad and stemi on GMT on statin and asa ldl at [facility]\par DM perfect stay on farxiga and metformin\par HTN is controlled \par fu in 6 months\par

## 2022-04-13 ENCOUNTER — APPOINTMENT (OUTPATIENT)
Dept: HEART AND VASCULAR | Facility: CLINIC | Age: 77
End: 2022-04-13
Payer: MEDICARE

## 2022-04-13 VITALS
WEIGHT: 150.99 LBS | HEIGHT: 66 IN | OXYGEN SATURATION: 95 % | SYSTOLIC BLOOD PRESSURE: 110 MMHG | BODY MASS INDEX: 24.27 KG/M2 | HEART RATE: 63 BPM | DIASTOLIC BLOOD PRESSURE: 57 MMHG

## 2022-04-13 PROCEDURE — 36415 COLL VENOUS BLD VENIPUNCTURE: CPT

## 2022-04-13 NOTE — PROCEDURE
[Normal] : Normal ECG response to treadmill exercise. [ECG Atrial Arrhythmias] : no arrhythmias [de-identified] : Tom [de-identified] : Cardiac rehab post cabg [de-identified] : 64 [de-identified] : 120/60 [de-identified] : 3:00 [de-identified] : 111 [de-identified] : 150/60 [de-identified] : 4.7 METS; 77 [de-identified] : Poor exercise tolerance [TWNoteComboBox1] : ROYAL [TWNoteComboBox2] : Yosef [TWNoteComboBox4] : fatigue [TWNoteComboBox3] : 1 [TWNoteComboBox5] : no chest pain

## 2022-04-13 NOTE — HISTORY OF PRESENT ILLNESS
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

## 2022-04-13 NOTE — ASSESSMENT
[FreeTextEntry1] : cad and stemi on GMT on statin and [facility] ldl at [facility]\par DM perfect stay on farxiga and metformin\par HTN is controlled \par fu in 6 months\par

## 2022-04-14 LAB
ALBUMIN SERPL ELPH-MCNC: 4.5 G/DL
ALP BLD-CCNC: 67 U/L
ALT SERPL-CCNC: 15 U/L
ANION GAP SERPL CALC-SCNC: 14 MMOL/L
AST SERPL-CCNC: 23 U/L
BILIRUB SERPL-MCNC: 0.4 MG/DL
BUN SERPL-MCNC: 19 MG/DL
CALCIUM SERPL-MCNC: 9.9 MG/DL
CHLORIDE SERPL-SCNC: 101 MMOL/L
CHOLEST SERPL-MCNC: 103 MG/DL
CO2 SERPL-SCNC: 24 MMOL/L
CREAT SERPL-MCNC: 1.06 MG/DL
CREAT SPEC-SCNC: 163 MG/DL
EGFR: 72 ML/MIN/1.73M2
ESTIMATED AVERAGE GLUCOSE: 166 MG/DL
GLUCOSE SERPL-MCNC: 115 MG/DL
HBA1C MFR BLD HPLC: 7.4 %
HDLC SERPL-MCNC: 35 MG/DL
LDLC SERPL CALC-MCNC: 51 MG/DL
MICROALBUMIN 24H UR DL<=1MG/L-MCNC: 2.3 MG/DL
MICROALBUMIN/CREAT 24H UR-RTO: 14 MG/G
NONHDLC SERPL-MCNC: 68 MG/DL
POTASSIUM SERPL-SCNC: 4.8 MMOL/L
PROT SERPL-MCNC: 7.4 G/DL
SODIUM SERPL-SCNC: 140 MMOL/L
TRIGL SERPL-MCNC: 85 MG/DL

## 2022-11-02 ENCOUNTER — APPOINTMENT (OUTPATIENT)
Dept: PULMONOLOGY | Facility: CLINIC | Age: 77
End: 2022-11-02

## 2022-11-02 ENCOUNTER — APPOINTMENT (OUTPATIENT)
Dept: HEART AND VASCULAR | Facility: CLINIC | Age: 77
End: 2022-11-02

## 2022-11-02 ENCOUNTER — NON-APPOINTMENT (OUTPATIENT)
Age: 77
End: 2022-11-02

## 2022-11-02 VITALS
TEMPERATURE: 97.6 F | BODY MASS INDEX: 24.43 KG/M2 | DIASTOLIC BLOOD PRESSURE: 60 MMHG | OXYGEN SATURATION: 97 % | HEART RATE: 76 BPM | SYSTOLIC BLOOD PRESSURE: 122 MMHG | WEIGHT: 152 LBS | HEIGHT: 66 IN

## 2022-11-02 VITALS
OXYGEN SATURATION: 97 % | HEIGHT: 66 IN | SYSTOLIC BLOOD PRESSURE: 110 MMHG | TEMPERATURE: 97.8 F | DIASTOLIC BLOOD PRESSURE: 56 MMHG | HEART RATE: 82 BPM

## 2022-11-02 PROCEDURE — 99214 OFFICE O/P EST MOD 30 MIN: CPT | Mod: 25

## 2022-11-02 PROCEDURE — 93000 ELECTROCARDIOGRAM COMPLETE: CPT

## 2022-11-02 PROCEDURE — 99204 OFFICE O/P NEW MOD 45 MIN: CPT | Mod: GC

## 2022-11-02 RX ORDER — FERROUS SULFATE 325(65) MG
325 (65 FE) TABLET ORAL
Qty: 90 | Refills: 2 | Status: ACTIVE | COMMUNITY
Start: 2021-10-14

## 2022-11-02 RX ORDER — LISINOPRIL 2.5 MG/1
2.5 TABLET ORAL DAILY
Qty: 90 | Refills: 3 | Status: DISCONTINUED | COMMUNITY
Start: 2019-09-25 | End: 2022-11-02

## 2022-11-02 NOTE — HISTORY OF PRESENT ILLNESS
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 Santos for evaluation of cough. 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 Says he now has an intermittent cough in the morning after he eats, and at night when he is lying down. Does not experience shortness of breath, last played basketball 2 weeks ago (will intermittently need albuterol inhaler while playing). Says every once in a blue moon he experiences possible wheezing and chest tightness. Has no difficulties sleeping does not wake up coughing, no tickle in back of throat. Recived flu vaccine, covid booster and believes he received covid vaccine. No hx of tb, night-sweats or weight changes. \par \par Primary: Dr. Reese\par \par \par  [YearQuit] : 1970

## 2022-11-02 NOTE — PHYSICAL EXAM
[No Acute Distress] : no acute distress [Normal Oropharynx] : normal oropharynx [Normal Appearance] : normal appearance [Normal Rate/Rhythm] : normal rate/rhythm [Normal S1, S2] : normal s1, s2 [No Resp Distress] : no resp distress [Clear to Auscultation Bilaterally] : clear to auscultation bilaterally

## 2022-11-02 NOTE — ASSESSMENT
[FreeTextEntry1] : Impression:\par Cough-ace-i related\par \par Plan: \par ddx includes 2/2 to medication vs. 2/2 to post nasal drip vs. gerd. Has a history of ace inhibitor induced cough that resolved while off of lisinopril. Was started on ace-i after cabg at which point his  cough returned. Was switched to ARB today. Will watch cough off of ace-i to see improvement, if no improvement will trial ppi for gerd given coughing with breakfast and while lying down at night. No evidence of nasal bogginess or exudate on exam. Possibly cough 2/2 to GERD given association with breakfast and night-time symptoms. Can pursue PFT if symptoms do not resolve. \par --\par Attending Addendum\par \par Seen and examined by me and agree w above. He has a known ACE-I cough and the cough recurred when he was rechallenged w ACE-I. His ACE-I was just stopped today. For that reason would defer further workup until we see if the cough resolves off ACE-I. If it does not then can evaluate for asthma and consider empiric tx for GERD.

## 2022-11-02 NOTE — CONSULT LETTER
[Dear  ___] : Dear  [unfilled], [( Thank you for referring [unfilled] for consultation for _____ )] : Thank you for referring [unfilled] for consultation for [unfilled] [Please see my note below.] : Please see my note below. [Consult Closing:] : Thank you very much for allowing me to participate in the care of this patient.  If you have any questions, please do not hesitate to contact me. [Sincerely,] : Sincerely, [FreeTextEntry2] : Devan Santos MD\par [address]\[place] [address]\par  [FreeTextEntry3] : Anastasia Hess MD, FCCP\par  [DrVicente  ___] : Dr. VIEIRA

## 2022-11-03 NOTE — HISTORY OF PRESENT ILLNESS
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

## 2022-11-03 NOTE — ASSESSMENT
[FreeTextEntry1] : cad and stemi on GMT on statin and asa ldl at [facility]\par stop lisinopril change to losartan due to cough he does not have HTN am choosing losartan just for CAD don’t need bp control\par DM perfect stay on farxiga and metformin\par HTN is controlled \par fu in 6 months\par

## 2022-11-03 NOTE — PROCEDURE
[Normal] : Normal ECG response to treadmill exercise. [ECG Atrial Arrhythmias] : no arrhythmias [de-identified] : Tom [de-identified] : Cardiac rehab post cabg [de-identified] : 120/60 [de-identified] : 64 [de-identified] : 3:00 [de-identified] : 111 [de-identified] : 150/60 [de-identified] : 4.7 METS; 77 [de-identified] : Poor exercise tolerance [TWNoteComboBox1] : ROYAL [TWNoteComboBox2] : Yosef [TWNoteComboBox4] : fatigue [TWNoteComboBox3] : 1 [TWNoteComboBox5] : no chest pain

## 2022-11-03 NOTE — PHYSICAL EXAM
[Well Developed] : well developed [Well Nourished] : well nourished [No Acute Distress] : no acute distress [Normal Venous Pressure] : normal venous pressure [No Carotid Bruit] : no carotid bruit [Normal S1, S2] : normal S1, S2 [No Murmur] : no murmur [No Rub] : no rub [No Gallop] : no gallop [Clear Lung Fields] : clear lung fields [Good Air Entry] : good air entry [No Respiratory Distress] : no respiratory distress  [Soft] : abdomen soft [Non Tender] : non-tender [No Masses/organomegaly] : no masses/organomegaly [Normal Bowel Sounds] : normal bowel sounds [Normal Gait] : normal gait [No Edema] : no edema [No Cyanosis] : no cyanosis [No Clubbing] : no clubbing [No Varicosities] : no varicosities [No Rash] : no rash [No Skin Lesions] : no skin lesions [Moves all extremities] : moves all extremities [No Focal Deficits] : no focal deficits [Normal Speech] : normal speech [Alert and Oriented] : alert and oriented [Normal memory] : normal memory [General Appearance - Well Developed] : well developed [Normal Appearance] : normal appearance [Well Groomed] : well groomed [General Appearance - Well Nourished] : well nourished [No Deformities] : no deformities [General Appearance - In No Acute Distress] : no acute distress [Normal Conjunctiva] : the conjunctiva exhibited no abnormalities [Eyelids - No Xanthelasma] : the eyelids demonstrated no xanthelasmas [Normal Oral Mucosa] : normal oral mucosa [No Oral Pallor] : no oral pallor [No Oral Cyanosis] : no oral cyanosis [Normal Jugular Venous A Waves Present] : normal jugular venous A waves present [Normal Jugular Venous V Waves Present] : normal jugular venous V waves present [No Jugular Venous Cummins A Waves] : no jugular venous cummins A waves [Respiration, Rhythm And Depth] : normal respiratory rhythm and effort [Auscultation Breath Sounds / Voice Sounds] : lungs were clear to auscultation bilaterally [Exaggerated Use Of Accessory Muscles For Inspiration] : no accessory muscle use [Heart Rate And Rhythm] : heart rate and rhythm were normal [Heart Sounds] : normal S1 and S2 [Murmurs] : no murmurs present [Abdomen Soft] : soft [Abdomen Tenderness] : non-tender [Abdomen Mass (___ Cm)] : no abdominal mass palpated [Abnormal Walk] : normal gait [Gait - Sufficient For Exercise Testing] : the gait was sufficient for exercise testing [Nail Clubbing] : no clubbing of the fingernails [Cyanosis, Localized] : no localized cyanosis [Petechial Hemorrhages (___cm)] : no petechial hemorrhages [Skin Color & Pigmentation] : normal skin color and pigmentation [] : no rash [No Venous Stasis] : no venous stasis [No Skin Ulcers] : no skin ulcer [Skin Lesions] : no skin lesions [No Xanthoma] : no  xanthoma was observed [Oriented To Time, Place, And Person] : oriented to person, place, and time [Affect] : the affect was normal [Mood] : the mood was normal [No Anxiety] : not feeling anxious

## 2023-01-11 ENCOUNTER — APPOINTMENT (OUTPATIENT)
Dept: PULMONOLOGY | Facility: CLINIC | Age: 78
End: 2023-01-11
Payer: MEDICARE

## 2023-01-11 VITALS
HEIGHT: 66 IN | DIASTOLIC BLOOD PRESSURE: 60 MMHG | SYSTOLIC BLOOD PRESSURE: 106 MMHG | TEMPERATURE: 98.8 F | OXYGEN SATURATION: 97 % | WEIGHT: 153 LBS | HEART RATE: 73 BPM | BODY MASS INDEX: 24.59 KG/M2

## 2023-01-11 DIAGNOSIS — D72.19 OTHER EOSINOPHILIA: ICD-10-CM

## 2023-01-11 PROCEDURE — 94727 GAS DIL/WSHOT DETER LNG VOL: CPT

## 2023-01-11 PROCEDURE — 99214 OFFICE O/P EST MOD 30 MIN: CPT | Mod: GC,25

## 2023-01-11 PROCEDURE — 36415 COLL VENOUS BLD VENIPUNCTURE: CPT | Mod: GC

## 2023-01-11 PROCEDURE — 94060 EVALUATION OF WHEEZING: CPT

## 2023-01-11 PROCEDURE — 94729 DIFFUSING CAPACITY: CPT

## 2023-01-11 NOTE — CONSULT LETTER
[Dear  ___] : Dear  [unfilled], [Courtesy Letter:] : I had the pleasure of seeing your patient, [unfilled], in my office today. [Please see my note below.] : Please see my note below. [Consult Closing:] : Thank you very much for allowing me to participate in the care of this patient.  If you have any questions, please do not hesitate to contact me. [Sincerely,] : Sincerely, [FreeTextEntry2] : Satya Alvarez MD\par [address] \[place] [address]\par  [FreeTextEntry3] : Anastasia Hess MD, FCCP\par  [DrVicente  ___] : Dr. VIEIRA

## 2023-01-11 NOTE — PHYSICAL EXAM
[No Acute Distress] : no acute distress [Normal Oropharynx] : normal oropharynx [Normal Appearance] : normal appearance [No Neck Mass] : no neck mass [Normal Rate/Rhythm] : normal rate/rhythm [Normal S1, S2] : normal s1, s2 [No Murmurs] : no murmurs [No Resp Distress] : no resp distress [Clear to Auscultation Bilaterally] : clear to auscultation bilaterally [No Abnormalities] : no abnormalities [Benign] : benign [Normal Gait] : normal gait [No Clubbing] : no clubbing [Normal Color/ Pigmentation] : normal color/ pigmentation [No Focal Deficits] : no focal deficits [Oriented x3] : oriented x3 [Normal Affect] : normal affect

## 2023-01-11 NOTE — HISTORY OF PRESENT ILLNESS
[Former] : former [< 20 pack-years] : < 20 pack-years [Never] : never [TextBox_4] : 11/2/2022 [[place]]: PMD Dr Alvarez. 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 Santos for evaluation of cough. 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 Says he now has an intermittent cough in the morning after he eats, and at night when he is lying down. Does not experience shortness of breath, last played basketball 2 weeks ago (will intermittently need albuterol inhaler while playing). Says every once in a blue moon he experiences possible wheezing and chest tightness. Has no difficulties sleeping does not wake up coughing, no tickle in back of throat. Recived flu vaccine, covid booster and believes he received covid vaccine. No hx of tb, night-sweats or weight changes. \par \par 1/11/23 [[place]]: presents today for follow up on abnormal CT of the Chest taken 12/2022. Was having some wheezing and his PCP ordered a CT chest to evaluate lung parenchyma. Very distant smoker. Some dust at home, but no mold. No pets or other exposures at work. 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 Has been on losartan since. Also takes metoprolol and atorvostatin everyday. \par \par \par  [YearQuit] : 1970 [ESS] : 0

## 2023-01-11 NOTE — ASSESSMENT
[FreeTextEntry1] : Data reviewed:\par \par Labs 2019 notable for eos 960/ul\par \par CT chest [facility] 12/2022 personally reviewed : peripheral reticulations without honeycombing, mosaic attenuation, fibrotic changes thorughout lung without cranio-caudal gradient, bronchioloectasis. \par \par PFT 1/13/23: FVC 3.05L (97%), FEV1 2.39L (102%), FEV1/FVC 79 (108%), TLC 4.46 (75%), DLCO 14.02 (68%)\par \par Impression:\par Interstitial Lung Disease \par Cough \par Eosinophilia \par \par Plan: \par Presenting with CT chest above, strongly concerning for interstitial lung disease. Given presence of ground glass and mosaic attenuation, favor alternative to UIP pattern, most likely CHP given imaging findings. He has no exposures (whether in the home or at work) to define a clear allergic trigger, but he is otherwise not dyspneic and very active despite his imaging findings. This is also reflected in his PFT, where the only significnt finding is a reduced TLC and residual volume. His DLCO is otherwise normal and spirometry does not reflect a restrictive defect \par \par - can continue advair from our perspective if it is helping; he may have some element of airway reactivity contributing to his cough vs secondary to underlying ILD \par - rheumatologic workup with CLAUDETTE and RF and CBC to assess for persistent eosinophilia \par - HP panel \par - plan to check lung function with q6 month PFTs; he is in agreement with this plan \par --\par Attending Addendum\par \par Seen and examined by me and agree w above. Returns now with imaging showing ILD; we favor CHP based on the imaging appearance. He remains generally asymptomatic with excellent exercise tolerance corroborated by the PFT today. We will check serologies and HP panel but plan on observation with serial follow up and PFT rather than invasive procedures or medication. He reports being UTD on vax from his PMD.

## 2023-01-18 LAB
ALTERN TENCAPG(M6): 7.7 MCG/ML
ANA SER IF-ACNC: NEGATIVE
ASPER FUMCAPG(M3): 12.8 MCG/ML
AUREOBASCAPG(M12): 11.5 MCG/ML
BASOPHILS # BLD AUTO: 0.05 K/UL
BASOPHILS NFR BLD AUTO: 0.6 %
EOSINOPHIL # BLD AUTO: 0.12 K/UL
EOSINOPHIL NFR BLD AUTO: 1.5 %
HCT VFR BLD CALC: 51.5 %
HGB BLD-MCNC: 16.3 G/DL
IMM GRANULOCYTES NFR BLD AUTO: 0.3 %
LACEYELLA SACCHARI IGG: 9.5 MCG/ML
LYMPHOCYTES # BLD AUTO: 1.92 K/UL
LYMPHOCYTES NFR BLD AUTO: 24.6 %
MAN DIFF?: NORMAL
MCHC RBC-ENTMCNC: 31.7 GM/DL
MCHC RBC-ENTMCNC: 31.8 PG
MCV RBC AUTO: 100.4 FL
MICROPOLYCAPG(M22): 15 MCG/ML
MONOCYTES # BLD AUTO: 0.63 K/UL
MONOCYTES NFR BLD AUTO: 8.1 %
NEUTROPHILS # BLD AUTO: 5.08 K/UL
NEUTROPHILS NFR BLD AUTO: 64.9 %
PENIC CHRYCAPG(M1): 12.7 MCG/ML
PHOMA BETAE IGG: 6.3 MCG/ML
PLATELET # BLD AUTO: 213 K/UL
RBC # BLD: 5.13 M/UL
RBC # FLD: 13.8 %
RHEUMATOID FACT SER QL: 90 IU/ML
TRICHODERMA VIRIDE IGG: 6.2 MCG/ML
WBC # FLD AUTO: 7.82 K/UL

## 2023-01-20 NOTE — PROGRESS NOTE ADULT - SUBJECTIVE AND OBJECTIVE BOX
Number neurosurgery Office Note  Emilia Hall 45 y o  male MRN: [id number]      Assessment/Plan      Patient is a 45years old pleasant gentleman status post C2 T1 PCF with C4-5 foraminotomies on 7/19/2022 ( Dr Zohra Douglas)  He is here for 6 months follow-up with flexion-extension cervical spine x-rays  Images demonstrate stable hardware's and expected postop changes, no hardware fracture or loosening  Patient is doing physical therapy  He noticed about 65% improvement with his gait, right muscle spasm, and shoulder abduction strength  Improved   Occasional paresthesia in the fingertips, also noticed  improvement with his gait  He denies any B/B dysfunction  He is taking Tizanidine and feeling much better with his spasms in the legs  Exam-patient understands oriented to 3  Moves all extremities   strength, elbow Flex/Extension including shoulder abduction 4/5  Sensation to LT intact, some tingling on right distal dorsal fingers  DTR 3+, no clonus bilaterally  Unstable gait noted  Incision healed well, no signs of infection/inflammation  Hx, PEx,and images reviewed with the patient  Mx plan discussed  Dr Zohra Douglas reviewed the images  Patient is improving  Myelopathy spine self assessment score of 14  Patient feeling much better when compared to his radicular and myelopathic symptoms prior to surgery, about 60% improved  Recommend continue PT/OT  F/U in 6 months with flexion-extension cervical spine x-rays  Patient can resume his job  Advised fall precaution, avoid lifting heavy objects, excessive extension or flexing of the neck  If he needs, can discuss with [facility] for job capacity evaluation and the recommendation  All questions and concerns were answered  Patient expresses understanding's and agreed to the plan  Plan:  1  Flexion-extension cervical spine x-rays in 6 months  2  Continue physical therapy/rehabilitation  3   Can resume his Jobs, based on physiatry evaluation for job [facility]  CRITICAL  CARE  attending     Hand off received 					   Pertinent clinical, laboratory, radiographic, hemodynamic, echocardiographic, respiratory data, microbiologic data and chart were reviewed and analyzed frequently throughout the course of the day and night  Patient seen and examined with CTS/ SH attending at bedside    Pt is a 73y , Male, post op day # 1 s/p OPCAB x 3    extubated  acute post H'gic anemia    Nutrition, metabolism, and development symptoms: Nutrition, metabolism, and development symptoms  Asthma: Asthma  Diabetes: Diabetes  HTN (hypertension): HTN (hypertension)  CAD (coronary artery disease): CAD (coronary artery disease)      , FAMILY HISTORY:  FH: myocardial infarction: mother: age 80s  PAST MEDICAL & SURGICAL HISTORY:  S/P cataract surgery  H/O shoulder surgery: B/L  H/O total knee replacement, right    Patient is a 73y old  Male who presents with a chief complaint of     14 system review was unremarkable    Vital signs, hemodynamic and respiratory parameters were reviewed from the bedside nursing flowsheet.  ICU Vital Signs Last 24 Hrs  T(C): 37.1 (07 Sep 2019 14:00), Max: 37.4 (07 Sep 2019 05:01)  T(F): 98.7 (07 Sep 2019 14:00), Max: 99.4 (07 Sep 2019 05:01)  HR: 86 (07 Sep 2019 16:00) (78 - 100)  BP: 117/70 (07 Sep 2019 10:10) (117/70 - 117/70)  BP(mean): 105 (07 Sep 2019 10:10) (105 - 105)  ABP: 136/46 (07 Sep 2019 16:00) (92/46 - 170/62)  ABP(mean): 70 (07 Sep 2019 16:00) (58 - 96)  RR: 18 (07 Sep 2019 16:00) (10 - 20)  SpO2: 98% (07 Sep 2019 16:00) (93% - 100%)    Adult Advanced Hemodynamics Last 24 Hrs  CVP(mm Hg): -3 (07 Sep 2019 14:00) (-3 - 14)  CVP(cm H2O): --  CO: --  CI: --  PA: --  PA(mean): --  PCWP: --  SVR: --  SVRI: --  PVR: --  PVRI: --, ABG - ( 07 Sep 2019 15:26 )  pH, Arterial: 7.52  pH, Blood: x     /  pCO2: 28    /  pO2: 80    / HCO3: 22    / Base Excess: 0.1   /  SaO2: 97                  Intake and output was reviewed and the fluid balance was calculated  Daily     Daily   I&O's Summary    06 Sep 2019 07:01  -  07 Sep 2019 07:00  --------------------------------------------------------  IN: 3570.6 mL / OUT: 3560 mL / NET: 10.6 mL    07 Sep 2019 07:01  -  07 Sep 2019 16:28  --------------------------------------------------------  IN: 614 mL / OUT: 800 mL / NET: -186 mL        All lines and drain sites were assessed  Glycemic trend was reviewedCAPILLARY BLOOD GLUCOSE      POCT Blood Glucose.: 117 mg/dL (07 Sep 2019 11:31)    No acute change in mental status  Auscultation of the chest reveals equal bs  Abdomen is soft  Extremities are warm and well perfused  Wounds appear clean and unremarkable  Antibiotics are periop    labs  CBC Full  -  ( 07 Sep 2019 15:27 )  WBC Count : 8.74 K/uL  RBC Count : 2.72 M/uL  Hemoglobin : 8.9 g/dL  Hematocrit : 26.7 %  Platelet Count - Automated : 136 K/uL  Mean Cell Volume : 98.2 fl  Mean Cell Hemoglobin : 32.7 pg  Mean Cell Hemoglobin Concentration : 33.3 gm/dL  Auto Neutrophil # : x  Auto Lymphocyte # : x  Auto Monocyte # : x  Auto Eosinophil # : x  Auto Basophil # : x  Auto Neutrophil % : x  Auto Lymphocyte % : x  Auto Monocyte % : x  Auto Eosinophil % : x  Auto Basophil % : x    09-07    136  |  104  |  13  ----------------------------<  325<H>  4.3   |  21<L>  |  0.94    Ca    8.4      07 Sep 2019 15:27  Phos  1.4     09-07  Mg     2.1     09-07    TPro  6.0  /  Alb  3.4  /  TBili  0.3  /  DBili  x   /  AST  32  /  ALT  10  /  AlkPhos  38<L>  09-07    PT/INR - ( 07 Sep 2019 15:27 )   PT: 16.6 sec;   INR: 1.45          PTT - ( 07 Sep 2019 15:27 )  PTT:30.8 sec  The current medications were reviewed   MEDICATIONS  (STANDING):  aspirin enteric coated 81 milliGRAM(s) Oral daily  atorvastatin 80 milliGRAM(s) Oral at bedtime  ceFAZolin  Injectable. 2000 milliGRAM(s) IV Push every 8 hours  chlorhexidine 2% Cloths 1 Application(s) Topical <User Schedule>  clopidogrel Tablet 75 milliGRAM(s) Oral daily  dextrose 5%. 1000 milliLiter(s) (50 mL/Hr) IV Continuous <Continuous>  dextrose 50% Injectable 50 milliLiter(s) IV Push every 15 minutes  dextrose 50% Injectable 25 milliLiter(s) IV Push every 15 minutes  docusate sodium 100 milliGRAM(s) Oral three times a day  fluticasone propionate 50 MICROgram(s)/spray Nasal Spray 1 Spray(s) Both Nostrils two times a day  heparin  Injectable 5000 Unit(s) SubCutaneous every 8 hours  insulin glargine Injectable (LANTUS) 15 Unit(s) SubCutaneous at bedtime  insulin lispro (HumaLOG) corrective regimen sliding scale   SubCutaneous Before meals and at bedtime  insulin lispro Injectable (HumaLOG) 5 Unit(s) SubCutaneous three times a day before meals  insulin regular Infusion 2 Unit(s)/Hr (2 mL/Hr) IV Continuous <Continuous>  metoprolol tartrate 12.5 milliGRAM(s) Oral every 12 hours  pantoprazole    Tablet 40 milliGRAM(s) Oral before breakfast  potassium phosphate IVPB 30 milliMole(s) IV Intermittent once  senna 2 Tablet(s) Oral at bedtime  sodium chloride 0.9%. 1000 milliLiter(s) (10 mL/Hr) IV Continuous <Continuous>    MEDICATIONS  (PRN):  acetaminophen   Tablet .. 650 milliGRAM(s) Oral every 6 hours PRN Temp greater or equal to 38C (100.4F), Mild Pain (1 - 3)  ALBUTerol    90 MICROgram(s) HFA Inhaler 2 Puff(s) Inhalation every 6 hours PRN Shortness of Breath  dextrose 40% Gel 15 Gram(s) Oral once PRN Blood Glucose LESS THAN 70 milliGRAM(s)/deciliter  glucagon  Injectable 1 milliGRAM(s) IntraMuscular once PRN Glucose LESS THAN 70 milligrams/deciliter  traMADol 25 milliGRAM(s) Oral every 6 hours PRN Moderate Pain (4 - 6)       PROBLEM LIST/ ASSESSMENT:  HEALTH ISSUES - PROBLEM Dx:  acute post H'gic anemia  s/p CABG  Nutrition, metabolism, and development symptoms: Nutrition, metabolism, and development symptoms  Asthma: Asthma  Diabetes: Diabetes  HTN (hypertension): HTN (hypertension)  CAD (coronary artery disease): CAD (coronary artery disease)      ,   Patient is a 73y old  Male who presents with a chief complaint of    s/p CABG      My plan includes :  close hemodynamic, ventilatory and drain monitoring and management per post op routine    Monitor for arrhythmias and monitor parameters for organ perfusion  monitor neurologic status  Head of the bed should remain elevated to 45 deg .   chest PT and IS will be encouraged  monitor adequacy of oxygenation and ventilation and attempt to wean oxygen  Nutritional goals will be met using po eventually , ensure adequate caloric intake and montior the same  Stress ulcer and VTE prophylaxis will be achieved    Glycemic control is satisfactory  Electrolytes have been repleted as necessary and wound care has been carried out. Pain control has been achieved.   agressive physical therapy and early mobility and ambulation goals will be met   The family was updated about the course and plan  CRITICAL CARE TIME SPENT in evaluation and management, reassessments, review and interpretation of labs and x-rays, ventilator and hemodynamic management, formulating a plan and coordinating care: __45____ MIN.  Time does not include procedural time.  CTICU ATTENDING     					    Anupam Goodwin MD capacity  4  Follow-up in 6 months with flexion-extension x-rays, SNPx Dr Wong Sos  5  Call with question or concern    I spent 30 minutes with the patient today in which >50% of the time was spent counseling/coordination of care regarding diagnosis, imaging review, symptoms and treatment plan  C/C: " For 6 months follow-up status post posterior cervical F/F    History of Present Illness     All patient's medical histories were reviewed and updated as appropriate : Allergies, current medication list, past medical history, past surgical history, family history, and  current medical lists  Patient here today for 6 months follow-up status post posterior cervical decompression and fusion for 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 patient reports his 60% improvement with his strength and balance  Patient denies any neck pain   He is doing physical therapy  Still residual gait issues but much better than before the surgery  He walks with and without cane or walker   strength is better  His shoulder abduction better  Patient denies any bowel/bladder dysfunction  Denies any fever, chills, rigors, cough or chest pain  Currently, following up with [facility] physical medicine and rehabilitation  Patient wants to go back to work  Flexion-extension cervical spine x-rays stable hardware and expected postop changes posterior cervical spine  REVIEW OF SYSTEMS  Review of system personally reviewed and updated  Review of Systems   Constitutional: Negative  HENT: Negative  Eyes: Negative  Respiratory: Negative  Cardiovascular: Negative  Gastrointestinal: Negative  Endocrine: Negative  Genitourinary: Negative  Musculoskeletal: Negative  S/p PCDF C2-T1, with C4-5 foraminotomies on 7/19/22  Patient stats he received 60% relief from surgery, very happy with results  PT Oct/Jan 2023    Skin: Negative  Allergic/Immunologic: Negative  Neurological: Negative  Hematological: Negative  Psychiatric/Behavioral: Negative  All other systems reviewed and are negative  Meds/Allergies     Current Outpatient Medications   Medication Sig Dispense Refill   • acetaminophen (TYLENOL) 500 mg tablet Take 1,000 mg by mouth if needed for mild pain     • docusate sodium (COLACE) 100 mg capsule Take 1 capsule (100 mg total) by mouth 2 (two) times a day (Patient taking differently: Take 100 mg by mouth if needed)  0   • finasteride (PROSCAR) 5 mg tablet Take 5 mg by mouth daily pt reports he is not taking this medication  (Patient not taking: No sig reported)     • gabapentin (NEURONTIN) 100 mg capsule Take 1 capsule (100 mg total) by mouth 3 (three) times a day 90 capsule 0   • meloxicam (MOBIC) 15 mg tablet Take 15 mg by mouth daily     • methocarbamol (ROBAXIN) 750 mg tablet Take 1 tablet (750 mg total) by mouth 2 (two) times a day as needed for muscle spasms 60 tablet 0   • oxyCODONE (ROXICODONE) 5 immediate release tablet Take 1 tablet (5 mg total) by mouth 2 (two) times a day as needed for severe pain Max Daily Amount: 10 mg (Patient not taking: Reported on 10/21/2022) 24 tablet 0   • tamsulosin (FLOMAX) 0 4 mg Take 0 4 mg by mouth daily       No current facility-administered medications for this visit  Allergies   Allergen Reactions   • Dilaudid [Hydromorphone] Anxiety, Other (See Comments) and Hypertension     Increase in temperature         PAST HISTORY    Past Medical History:   Diagnosis Date   • Enlarged prostate        Past Surgical History:   Procedure Laterality Date   • ABCESS DRAINAGE     • KNEE ARTHROSCOPY Left     as a teenager   • SC ARTHRD PST/PSTLAT TQ 1NTRSPC CRV BELW C2 SEGMENT N/A 7/19/2022    Procedure: POSTERIOR CERVICAL LAMINECTOMY C3-6, FIXATION FUSION C2-T1 (IMPULSE MONITORING);   Surgeon: Marcie Alan MD;  Location: [facility];  Service: Neurosurgery       Social History     Tobacco Use   • Smoking status: Former     Packs/day: 0 50     Types: Cigarettes     Quit date: 9/15/2021     Years since quittin 3   • Smokeless tobacco: Never   • Tobacco comments:     quit 9 months ago   Vaping Use   • Vaping Use: Never used   Substance Use Topics   • Alcohol use: Never   • Drug use: Not Currently       Family History   Problem Relation Age of Onset   • Hypertension Mother    • Rheum arthritis Mother    • Diabetes Father    • Anxiety disorder Father          Above history personally reviewed  EXAM    Vitals: There were no vitals taken for this visit  ,There is no height or weight on file to calculate BMI  Physical Exam  Constitutional:       Appearance: Normal appearance  HENT:      Head: Normocephalic and atraumatic  Cardiovascular:      Rate and Rhythm: Normal rate  Pulses: Normal pulses  Pulmonary:      Effort: Pulmonary effort is normal    Musculoskeletal:      Cervical back: Rigidity present  Neurological:      Mental Status: He is alert  GCS: GCS eye subscore is 4  GCS verbal subscore is 5  GCS motor subscore is 6  Cranial Nerves: Cranial nerves 2-12 are intact  Sensory: Sensory deficit present  Motor: Weakness present  Coordination: Finger-Nose-Finger Test normal       Gait: Gait abnormal       Deep Tendon Reflexes:      Reflex Scores:       Tricep reflexes are 3+ on the right side and 3+ on the left side  Bicep reflexes are 3+ on the right side and 3+ on the left side  Brachioradialis reflexes are 3+ on the right side and 3+ on the left side  Patellar reflexes are 3+ on the right side and 3+ on the left side  Achilles reflexes are 3+ on the right side and 3+ on the left side  Psychiatric:         Speech: Speech normal          Neurologic Exam     Mental Status   Speech: speech is normal   Level of consciousness: alert    Cranial Nerves   Cranial nerves II through XII intact       CN III, IV, VI   Nystagmus: none     CN XI   CN XI normal      Motor Exam   Muscle bulk: normal  Overall muscle tone: normal  Right arm tone: normal  Left arm tone: normal  Right arm pronator drift: absent  Left arm pronator drift: absent  Right leg tone: normal  Left leg tone: normal    Sensory Exam   Right arm light touch: decreased from fingers  Right leg light touch: normal  Left leg light touch: normal    Gait, Coordination, and Reflexes     Coordination   Finger to nose coordination: normal    Reflexes   Right brachioradialis: 3+  Left brachioradialis: 3+  Right biceps: 3+  Left biceps: 3+  Right triceps: 3+  Left triceps: 3+  Right patellar: 3+  Left patellar: 3+  Right achilles: 3+  Left achilles: 3+  Right [place]: absent  Right ankle clonus: absent  Left ankle clonus: present  Left pendular knee jerk: absent        MEDICAL DECISION MAKING    Imaging Studies:     No results found      I have personally reviewed pertinent reports   , I have personally reviewed pertinent films in PACS and I have personally reviewed pertinent films in PACS with a Radiologist

## 2023-04-14 NOTE — PATIENT PROFILE ADULT - ANY IMPAIRED UPPER EXTREMITY FUNCTION WITHIN A WEEK PRIOR TO ADMISSION RELATED TO?
OFFICE VISIT      Patient: Aleksandar Garcia   : 1979 MRN: [id number]    SUBJECTIVE:  Chief Complaint   Patient presents with   • Physical       A 43 year old male is here for an annual physical examination.    Patient has given consent to record this visit for documentation in their clinical record.     HISTORY OF PRESENT ILLNESS:  Annual physical exam:  Diet and exercise: He is trying to maintain his diet. He had followed up with Nutritionist. He is trying to do exercises.  Eye exam: is up-to-date with eye exam.  States has noticed weight gain, must be due to as he is working from home and not much active.  His today blood pressure is normal.    Anxiety:  He is currently on medication. He is thinking of getting off the medication, as feeling better.    Hypercholesterolemia:  He has a history of hypercholesterolemia currently on medications.     Vitamin D deficiency:   He is currently on medications.     Need for hepatitis C screening test:   Due for hepatitis C screening.    PAST MEDICAL HISTORY:  History reviewed. No pertinent past medical history.  MEDICATIONS:  Current Outpatient Medications   Medication Sig   • buPROPion XL (WELLBUTRIN XL) 300 MG 24 hr tablet Take 1 tablet by mouth daily.   • ondansetron (ZOFRAN ODT) 8 MG disintegrating tablet Place 1 tablet onto the tongue every 8 hours as needed for Nausea.   • hydrOXYzine (ATARAX) 50 MG tablet Take 1 tablet by mouth every 8 hours as needed for Anxiety.   • cholecalciferol (VITAMIN D) 25 mcg (1,000 units) tablet Take 50 mcg by mouth daily.     No current facility-administered medications for this visit.     ALLERGIES:  ALLERGIES:  No Known Allergies  PAST SURGICAL HISTORY:  Past Surgical History:   Procedure Laterality Date   • Vasectomy       FAMILY HISTORY:  Family History   Problem Relation Age of Onset   • Cancer Paternal Grandfather         lung   • Heart disease Neg Hx    • Diabetes Neg Hx      SOCIAL HISTORY:  Social History     Tobacco Use    Smoking Status Never   Smokeless Tobacco Never     Social History     Substance and Sexual Activity   Alcohol Use No   • Alcohol/week: 0.0 standard drinks       REVIEW OF SYSTEMS:  Constitutional: Per HPI.  Eyes: No changes in the vision.  ENT: No nasal problems or issues with the throat. No changes in hearing or difficulty swallowing.  Respiratory: No difficulty breathing, shortness of breath, or cough.  Cardiovascular: No chest pain.  Gastrointestinal: No heartburn, nausea, vomiting, diarrhea, constipation, hematochezia, or melena.  Genitourinary:  No urinary complaints. No testicular lumps or bumps.   Musculoskeletal: No muscle or joint pain.   Neurological: No numbness, weakness or tingling.  Endocrine: No heat or cold intolerance, polyuria or polydipsia.  Skin: No skin changes.  Psychiatry: No anxiety or depression.    OBJECTIVE:  Vitals:    04/13/23 0855   BP: 112/84   BP Location: [facility] Left upper extremity   Patient Position: Sitting   Cuff Size: Regular   Pulse: 65   Weight: 91.6 kg (202 lb)   Height: 6'       Physical Examination:  Constitutional: Alert, in no acute distress.  Eyes: No discharge, normal conjunctiva, no eyelid swelling, no ptosis and the sclerae were normal. Pupils equal, round and reactive to light and accommodation, conjugate gaze and extraocular movements were intact.  ENT: Normal appearing outer ear, normal appearing nose. Examination of the tympanic membrane showed normal landmarks, normal appearing external canal. Nasal mucosa moist and pink, no nasal discharge. Normal lips. Oral mucosa pink and moist, no oral lesions.  Neck: Normal appearing, supple neck and no mass was seen. Thyroid not enlarged and no thyroid nodules. No lymphadenopathy.  Pulmonary: No respiratory distress, normal respiratory rate and effort and no accessory muscle use. Breath sounds clear to auscultation bilaterally.  Cardiovascular: Normal rate, no murmurs,, regular rhythm, normal S1 and normal S2. Edema was  not present in the lower extremities.  Abdomen: Soft, nontender, nondistended, normal bowel sounds and no abdominal mass. No hepatomegaly and no splenomegaly. No umbilical hernia was seen.  : Normal male genitalia, with no testicular masses, no hernias.    Musculoskeletal: Normal gait. No musculoskeletal erythema was seen, no joint swelling seen and no joint tenderness was elicited. No scoliosis. Normal range of motion. There was no joint instability noted. Muscle strength and tone were normal.  Neurological: cranial nerves II-XII are grossly intact. Reflexes are normal. No sensory changes.  Psychiatric: Oriented to person, oriented to place and oriented to time. Interactive and mood/affect were appropriate. Judgment not impaired. Normal attention span. Short term memory intact.  Skin, Hair, Nails: Normal skin color and pigmentation and no rash. No foot ulcers and no skin ulcer was seen. Normal skin turgor. No clubbing or cyanosis of the fingernails.    DIAGNOSTIC STUDIES:  LAB RESULTS:  Lab Services on 04/10/2023   Component Date Value Ref Range Status   • Fasting Status 04/10/2023 12  0 - 999 Hours Final   • Cholesterol 04/10/2023 246 (A)  <=199 mg/dL Final   • Triglycerides 04/10/2023 298 (A)  <=149 mg/dL Final   • HDL 04/10/2023 38 (A)  >=40 mg/dL Final   • LDL 04/10/2023 148 (A)  <=129 mg/dL Final   • Non-HDL Cholesterol 04/10/2023 208  mg/dL Final   • Cholesterol/ HDL Ratio 04/10/2023 6.5 (A)  <=4.4 Final   • COLOR, URINALYSIS 04/10/2023 Yellow   Final   • APPEARANCE, URINALYSIS 04/10/2023 Clear   Final   • GLUCOSE, URINALYSIS 04/10/2023 Negative  Negative mg/dL Final   • BILIRUBIN, URINALYSIS 04/10/2023 Negative  Negative Final   • KETONES, URINALYSIS 04/10/2023 Negative  Negative mg/dL Final   • SPECIFIC GRAVITY, URINALYSIS 04/10/2023 1.020  1.005 - 1.030 Final   • OCCULT BLOOD, URINALYSIS 04/10/2023 Negative  Negative Final   • PH, URINALYSIS 04/10/2023 6.0  5.0 - 7.0 Final   • PROTEIN, URINALYSIS  04/10/2023 Negative  Negative mg/dL Final   • UROBILINOGEN, URINALYSIS 04/10/2023 0.2  0.2, 1.0 mg/dL Final   • NITRITE, URINALYSIS 04/10/2023 Negative  Negative Final   • LEUKOCYTE ESTERASE, URINALYSIS 04/10/2023 Negative  Negative Final   • SQUAMOUS EPITHELIAL, URINALYSIS 04/10/2023 None Seen  None Seen, 1 to 5 /hpf Final   • ERYTHROCYTES, URINALYSIS 04/10/2023 None Seen  None Seen, 1 to 2 /hpf Final   • LEUKOCYTES, URINALYSIS 04/10/2023 None Seen  None Seen, 1 to 5 /hpf Final   • BACTERIA, URINALYSIS 04/10/2023 None Seen  None Seen /hpf Final   • HYALINE CASTS, URINALYSIS 04/10/2023 None Seen  None Seen, 1 to 5 /lpf Final   • MUCUS 04/10/2023 Present   Final   • Sodium 04/10/2023 140  135 - 145 mmol/L Final   • Potassium 04/10/2023 4.3  3.4 - 5.1 mmol/L Final   • Chloride 04/10/2023 102  97 - 110 mmol/L Final   • Carbon Dioxide 04/10/2023 31  21 - 32 mmol/L Final   • Anion Gap 04/10/2023 11  7 - 19 mmol/L Final   • Glucose 04/10/2023 90  70 - 99 mg/dL Final   • BUN 04/10/2023 12  6 - 20 mg/dL Final   • Creatinine 04/10/2023 1.27 (A)  0.67 - 1.17 mg/dL Final   • Glomerular Filtration Rate 04/10/2023 71  >=60 Final   • BUN/Cr 04/10/2023 9  7 - 25 Final   • Calcium 04/10/2023 9.0  8.4 - 10.2 mg/dL Final   • Bilirubin, Total 04/10/2023 0.5  0.2 - 1.0 mg/dL Final   • GOT/AST 04/10/2023 19  <=37 Units/L Final   • GPT/ALT 04/10/2023 31  <64 Units/L Final   • Alkaline Phosphatase 04/10/2023 88  45 - 117 Units/L Final   • Albumin 04/10/2023 3.9  3.6 - 5.1 g/dL Final   • Protein, Total 04/10/2023 7.0  6.4 - 8.2 g/dL Final   • Globulin 04/10/2023 3.1  2.0 - 4.0 g/dL Final   • A/G Ratio 04/10/2023 1.3  1.0 - 2.4 Final   • Vitamin D, 25-Hydroxy 04/10/2023 22.7 (A)  30.0 - 100.0 ng/mL Final   • WBC 04/10/2023 6.3  4.2 - 11.0 K/mcL Final   • RBC 04/10/2023 5.26  4.50 - 5.90 mil/mcL Final   • HGB 04/10/2023 14.4  13.0 - 17.0 g/dL Final   • HCT 04/10/2023 42.9  39.0 - 51.0 % Final   • MCV 04/10/2023 81.6  78.0 - 100.0 fl Final    • MCH 04/10/2023 27.4  26.0 - 34.0 pg Final   • MCHC 04/10/2023 33.6  32.0 - 36.5 g/dL Final   • RDW-CV 04/10/2023 12.9  11.0 - 15.0 % Final   • RDW-SD 04/10/2023 39.0  39.0 - 50.0 fL Final   • PLT 04/10/2023 275  140 - 450 K/mcL Final   • Neutrophil, Percent 04/10/2023 59  % Final   • Lymphocytes, Percent 04/10/2023 31  % Final   • Mono, Percent 04/10/2023 8  % Final   • Eosinophils, Percent 04/10/2023 2  % Final   • Basophils, Percent 04/10/2023 0  % Final   • Immature Granulocytes 04/10/2023 0  % Final   • Absolute Neutrophils 04/10/2023 3.7  1.8 - 7.7 K/mcL Final   • Absolute Lymphocytes 04/10/2023 1.9  1.0 - 4.8 K/mcL Final   • Absolute Monocytes 04/10/2023 0.5  0.3 - 0.9 K/mcL Final   • Absolute Eosinophils  04/10/2023 0.1  0.0 - 0.5 K/mcL Final   • Absolute Basophils 04/10/2023 0.0  0.0 - 0.3 K/mcL Final   • Absolute Immature Granulocytes 04/10/2023 0.0  0.0 - 0.2 K/mcL Final   • Hepatitis C Antibody 04/10/2023 Negative  Negative Final       ASSESSMENT AND PLAN:  This is a 43 year old male who presents with :  1. Annual physical exam    2. Anxiety    3. Hypercholesteremia    4. Vitamin D deficiency    5. Need for hepatitis C screening test        PLAN:  Annual physical exam:  Recent labs reviewed and discussed in detail.  Current medications reviewed and reconciled. Continue current medications.  Past medical, surgical, social and family history, allergies, reviewed, and updated.  Advised to use a seatbelt when traveling by car and use sunscreen when going outdoors.   Encouraged weight reduction. Advised to perform regular aerobic exercises for 20-30 minutes for most of the days of the week.   Discussed balanced diet; recommended a Mediterranean diet.     Decrease intake of simple carb's, refined sugars, simple and processed foods. Eat more whole grains, fruits and vegetables.Advised to restrict intake of saturated fats and encouraged intake of proteins from lean meat.  Ordered CBC with automated  differential, lipid panel with reflex, urinalysis with microscopy and culture if indicated and comprehensive metabolic panel in one year.  Refilled Ondansetron 8 mg and Atrax 50 mg.    Anxiety:  Well controlled.  Recommended not to discontinue medications.  Discussed would step down the dose of Wellbutrin to 150 mg daily for two months and then every alternate day and then stop.  Refilled Wellbutrin 300 mg.    Hypercholesteremia:  Well controlled.  Continue current medications.    Vitamin D deficiency:  Ordered vitamin D hydroxy in couple of months.      Need for hepatitis C screening test:   Explained importance and guideline of hepatitis C screening.  Ordered hepatitis C antibody with reflex.     Body mass index is 27.4 kg/m².    BMI ASSESSMENT/PLAN:  Patient is overweight.    Low carbohydrate diet       Recent Review Flowsheet Data     Date 4/13/2023    Adult PHQ 2 Score 0    Adult PHQ 2 Interpretation No further screening needed    Little interest or pleasure in activity? Not at all    Feeling down, depressed or hopeless? Not at all          DEPRESSION ASSESSMENT/PLAN:  Depression screening is negative no further plan needed.     Orders Placed This Encounter   • Vitamin D -25 Hydroxy   • Lipid Panel With Reflex   • Urinalysis With Microscopy & Culture If Indicated   • Comprehensive Metabolic Panel   • CBC with Automated Differential   • Vitamin D -25 Hydroxy   • Hepatitis C Antibody With Reflex   • buPROPion XL (WELLBUTRIN XL) 300 MG 24 hr tablet   • ondansetron (ZOFRAN ODT) 8 MG disintegrating tablet   • hydrOXYzine (ATARAX) 50 MG tablet     Follow-up in one year or sooner as needed.  No follow-ups on file.    Medical compliance with plan discussed and risks of non-compliance reviewed.  Patient education completed on disease process, etiology & prognosis.  Proper usage and side effects of medications reviewed & discussed.  Patient understands and agrees with the plan.  Return to clinic as clinically indicated  as discussed with patient who verbalized understanding of the plan and is in agreement with the plan.    I,  Dr. Qi Washburn, have created a visit summary document based on the audio recording between Dr. Darryl Maradiaga MD and this patient for the physician to review, edit as needed, and authenticate.  Creation Date: 4/13/2023        I, Dr. Darryl Maradiaga MD, have reviewed and edited the visit summary above and attest that it is accurate.       no

## 2023-05-02 ENCOUNTER — APPOINTMENT (OUTPATIENT)
Dept: HEART AND VASCULAR | Facility: CLINIC | Age: 78
End: 2023-05-02
Payer: MEDICARE

## 2023-05-02 ENCOUNTER — NON-APPOINTMENT (OUTPATIENT)
Age: 78
End: 2023-05-02

## 2023-05-02 VITALS
HEART RATE: 72 BPM | BODY MASS INDEX: 24.75 KG/M2 | SYSTOLIC BLOOD PRESSURE: 115 MMHG | DIASTOLIC BLOOD PRESSURE: 70 MMHG | TEMPERATURE: 97.3 F | OXYGEN SATURATION: 97 % | HEIGHT: 66 IN | WEIGHT: 153.99 LBS

## 2023-05-02 DIAGNOSIS — I51.9 HEART DISEASE, UNSPECIFIED: ICD-10-CM

## 2023-05-02 PROCEDURE — 93000 ELECTROCARDIOGRAM COMPLETE: CPT

## 2023-05-02 NOTE — ASSESSMENT
[FreeTextEntry1] : cad and stemi on GMT on statin and asa ldl at goal previously i need his new labs to see if he needs additional meds he is on toprol xl 25 mg daily for angina\par DM perfect stay on farxiga 5 and metformin 500 2 tab bid\par HTN is controlled on losartan 25 mg daiily \par rcri score is 1 optimized from a cardiac standpoint for low risk procedure \par Interstitial lung disease with sob will repeat echocardiogram for PASP\par fu in 6 months\par

## 2023-05-02 NOTE — PROCEDURE
[Normal] : Normal ECG response to treadmill exercise. [ECG Atrial Arrhythmias] : no arrhythmias [de-identified] : Tom [de-identified] : Cardiac rehab post cabg [de-identified] : 64 [de-identified] : 120/60 [de-identified] : 3:00 [de-identified] : 111 [de-identified] : 150/60 [de-identified] : 4.7 METS; 77 [de-identified] : Poor exercise tolerance [TWNoteComboBox1] : ROYAL [TWNoteComboBox2] : Yosef [TWNoteComboBox4] : fatigue [TWNoteComboBox3] : 1 [TWNoteComboBox5] : no chest pain

## 2023-05-02 NOTE — HISTORY OF PRESENT ILLNESS
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

## 2023-05-08 ENCOUNTER — APPOINTMENT (OUTPATIENT)
Dept: HEART AND VASCULAR | Facility: CLINIC | Age: 78
End: 2023-05-08
Payer: MEDICARE

## 2023-05-08 PROCEDURE — 93306 TTE W/DOPPLER COMPLETE: CPT

## 2023-05-23 NOTE — PHYSICAL EXAM
Name band; [Well Developed] : well developed [Well Nourished] : well nourished [No Acute Distress] : no acute distress [Normal Venous Pressure] : normal venous pressure [No Carotid Bruit] : no carotid bruit [Normal S1, S2] : normal S1, S2 [No Murmur] : no murmur [No Rub] : no rub [No Gallop] : no gallop [Clear Lung Fields] : clear lung fields [Good Air Entry] : good air entry [No Respiratory Distress] : no respiratory distress  [Soft] : abdomen soft [Non Tender] : non-tender [No Masses/organomegaly] : no masses/organomegaly [Normal Bowel Sounds] : normal bowel sounds [Normal Gait] : normal gait [No Edema] : no edema [No Cyanosis] : no cyanosis [No Clubbing] : no clubbing [No Varicosities] : no varicosities [No Rash] : no rash [No Skin Lesions] : no skin lesions [Moves all extremities] : moves all extremities [No Focal Deficits] : no focal deficits [Normal Speech] : normal speech [Alert and Oriented] : alert and oriented [Normal memory] : normal memory [General Appearance - Well Developed] : well developed [Normal Appearance] : normal appearance [Well Groomed] : well groomed [General Appearance - Well Nourished] : well nourished [No Deformities] : no deformities [General Appearance - In No Acute Distress] : no acute distress [Normal Conjunctiva] : the conjunctiva exhibited no abnormalities [Eyelids - No Xanthelasma] : the eyelids demonstrated no xanthelasmas [Normal Oral Mucosa] : normal oral mucosa [No Oral Pallor] : no oral pallor [No Oral Cyanosis] : no oral cyanosis [Normal Jugular Venous A Waves Present] : normal jugular venous A waves present [Normal Jugular Venous V Waves Present] : normal jugular venous V waves present [No Jugular Venous Cummins A Waves] : no jugular venous cummins A waves [Respiration, Rhythm And Depth] : normal respiratory rhythm and effort [Exaggerated Use Of Accessory Muscles For Inspiration] : no accessory muscle use [Auscultation Breath Sounds / Voice Sounds] : lungs were clear to auscultation bilaterally [Heart Rate And Rhythm] : heart rate and rhythm were normal [Heart Sounds] : normal S1 and S2 [Murmurs] : no murmurs present [Abdomen Soft] : soft [Abdomen Tenderness] : non-tender [Abdomen Mass (___ Cm)] : no abdominal mass palpated [Abnormal Walk] : normal gait [Gait - Sufficient For Exercise Testing] : the gait was sufficient for exercise testing [Nail Clubbing] : no clubbing of the fingernails [Cyanosis, Localized] : no localized cyanosis [Petechial Hemorrhages (___cm)] : no petechial hemorrhages [Skin Color & Pigmentation] : normal skin color and pigmentation [] : no rash [No Venous Stasis] : no venous stasis [Skin Lesions] : no skin lesions [No Skin Ulcers] : no skin ulcer [No Xanthoma] : no  xanthoma was observed [Oriented To Time, Place, And Person] : oriented to person, place, and time [Affect] : the affect was normal [Mood] : the mood was normal [No Anxiety] : not feeling anxious

## 2023-06-07 ENCOUNTER — APPOINTMENT (OUTPATIENT)
Dept: PULMONOLOGY | Facility: CLINIC | Age: 78
End: 2023-06-07
Payer: MEDICARE

## 2023-06-07 VITALS
DIASTOLIC BLOOD PRESSURE: 80 MMHG | SYSTOLIC BLOOD PRESSURE: 110 MMHG | BODY MASS INDEX: 24.91 KG/M2 | HEIGHT: 66 IN | TEMPERATURE: 97.9 F | HEART RATE: 92 BPM | OXYGEN SATURATION: 95 % | WEIGHT: 155 LBS

## 2023-06-07 PROCEDURE — 99214 OFFICE O/P EST MOD 30 MIN: CPT | Mod: GC

## 2023-06-07 RX ORDER — FLUTICASONE PROPIONATE 50 UG/1
50 SPRAY, METERED NASAL DAILY
Qty: 5 | Refills: 0 | Status: ACTIVE | COMMUNITY
Start: 2022-05-13

## 2023-06-07 NOTE — HISTORY OF PRESENT ILLNESS
[Former] : former [< 20 pack-years] : < 20 pack-years [Never] : never [TextBox_4] : 11/2/2022 [[place]]: PMD Dr Alvarez. 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 Santos for evaluation of cough. 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 Says he now has an intermittent cough in the morning after he eats, and at night when he is lying down. Does not experience shortness of breath, last played basketball 2 weeks ago (will intermittently need albuterol inhaler while playing). Says every once in a blue moon he experiences possible wheezing and chest tightness. Has no difficulties sleeping does not wake up coughing, no tickle in back of throat. Recived flu vaccine, covid booster and believes he received covid vaccine. No hx of tb, night-sweats or weight changes. \par \par 1/11/23 [[place]]: presents today for follow up on abnormal CT of the Chest taken 12/2022. Was having some wheezing and his PCP ordered a CT chest to evaluate lung parenchyma. Very distant smoker. Some dust at home, but no mold. No pets or other exposures at work. 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 Has been on losartan since. Also takes metoprolol and atorvostatin everyday. \par \par 6/7/2023 [[place]]: here today for cough. Cough has been going on for months - productive of clear white sputum, has not gotten any better or worse since it startd. No new medications or changes. Has associated nasal congestion and PND. Symptoms also worse after eating meals and laying flat. No fever or chills or infectious symptoms. Using advair religiously BID, feels like this helps somewhat. Otherwise continues to be active, playing basketball routinely. No interim hospitalizations of ER/UC visits. \par \par  [YearQuit] : 1971

## 2023-06-07 NOTE — CONSULT LETTER
[Dear  ___] : Dear  [unfilled], [Courtesy Letter:] : I had the pleasure of seeing your patient, [unfilled], in my office today. [Please see my note below.] : Please see my note below. [Consult Closing:] : Thank you very much for allowing me to participate in the care of this patient.  If you have any questions, please do not hesitate to contact me. [Sincerely,] : Sincerely, [FreeTextEntry2] : Satya Alvarez MD\par [address] \[place] [address]\par  [FreeTextEntry3] : Anastasia Hess MD, FCCP\par

## 2023-06-07 NOTE — PHYSICAL EXAM
[No Acute Distress] : no acute distress [Normal Oropharynx] : normal oropharynx [Normal Appearance] : normal appearance [No Neck Mass] : no neck mass [Normal Rate/Rhythm] : normal rate/rhythm [Normal S1, S2] : normal s1, s2 [No Murmurs] : no murmurs [No Resp Distress] : no resp distress [Clear to Auscultation Bilaterally] : clear to auscultation bilaterally [No Abnormalities] : no abnormalities [Benign] : benign [Normal Gait] : normal gait [No Clubbing] : no clubbing [Normal Color/ Pigmentation] : normal color/ pigmentation [No Focal Deficits] : no focal deficits [Oriented x3] : oriented x3 [Normal Affect] : normal affect [TextBox_68] : faint velcro like crackles b/l

## 2023-06-07 NOTE — REVIEW OF SYSTEMS
[Nasal Congestion] : nasal congestion [Postnasal Drip] : postnasal drip [Sinus Problems] : sinus problems [Cough] : cough [Sputum] : sputum [GERD] : gerd [Negative] : Musculoskeletal [Fever] : no fever [Chills] : no chills [Sore Throat] : no sore throat [Dyspnea] : no dyspnea [Wheezing] : no wheezing [SOB on Exertion] : no sob on exertion [Headache] : no headache

## 2023-06-07 NOTE — ASSESSMENT
[FreeTextEntry1] : Data reviewed:\par \par CBC 2019 eos 960/ul\par \par CT chest [facility] 12/2022 personally reviewed : peripheral reticulations without honeycombing, mosaic attenuation, fibrotic changes thorughout lung without cranio-caudal gradient, bronchioloectasis. \par \par PFT 1/13/23: FVC 3.05L (97%), FEV1 2.39L (102%), FEV1/FVC 79 (108%), TLC 4.46 (75%), DLCO 14.02 (68%)\par \par Impression:\par Interstitial Lung Disease \par Cough \par Eosinophilia \par \par Plan: \par Coming with new cough for one month, subacute in the setting of recent URI - most likely multifactorial and due to PND, reflux, and perhaps some element of dry cough from his ILD. Also possible reactive airways given prior eosinophilia and relief with inhalers \par \par - can continue advair from our perspective if it is helping; he may have some element of airway reactivity contributing to his cough vs secondary to underlying ILD \par - flonase BID for PND and PPI daily for GERD \par - follow up in 6 weeks for planned PFTs; reach out to us sooner if symptoms are getting worse \par --\par Attending Addendum\par \par Seen and examined by me and agree w above. NCS and PPI for the cough, and return for planned follow up w PFT given significant radiographic ILD in this asymptomatic 78yo man.

## 2023-07-10 ENCOUNTER — APPOINTMENT (OUTPATIENT)
Dept: PULMONOLOGY | Facility: CLINIC | Age: 78
End: 2023-07-10

## 2023-07-10 ENCOUNTER — APPOINTMENT (OUTPATIENT)
Dept: PULMONOLOGY | Facility: CLINIC | Age: 78
End: 2023-07-10
Payer: MEDICARE

## 2023-07-10 VITALS
OXYGEN SATURATION: 93 % | HEART RATE: 85 BPM | BODY MASS INDEX: 24.91 KG/M2 | SYSTOLIC BLOOD PRESSURE: 130 MMHG | WEIGHT: 155 LBS | DIASTOLIC BLOOD PRESSURE: 70 MMHG | TEMPERATURE: 98.2 F | HEIGHT: 66 IN

## 2023-07-10 PROCEDURE — 94729 DIFFUSING CAPACITY: CPT

## 2023-07-10 PROCEDURE — 99214 OFFICE O/P EST MOD 30 MIN: CPT | Mod: 25

## 2023-07-10 PROCEDURE — 94060 EVALUATION OF WHEEZING: CPT

## 2023-07-10 PROCEDURE — 94727 GAS DIL/WSHOT DETER LNG VOL: CPT

## 2023-07-10 NOTE — ASSESSMENT
[FreeTextEntry1] : Data reviewed:\par \par CBC 2019 eos 960/ul\par \par CT chest [facility] 12/2022 personally reviewed : peripheral reticulations without honeycombing, mosaic attenuation, fibrotic changes throughout lung without cranio-caudal gradient, bronchioloectasis. \par \par PFT 1/13/23: FVC 3.05L (97%), FEV1 2.39L (102%), FEV1/FVC 79 (108%), TLC 4.46 (75%), DLCO 14.02 (68%)\par PFT 7/10/2023: FVC 3.16L (101%), FEV1 2.19L (94%), FEV1/FVC 69%, TLC 4.31L (73%), DLCO 13.07 (64%)\par \par Impression:\par ILD \par Chronic cough \par Eosinophilia \par \par Plan: \par Continue Advair, Flonase and PPI.  \par Follow in 2-3 mos on these therapies continuously.\par The PFT is stable. Will repeat PFT and CT approx Dec 2023.\par Let them know I am always glad to have his daughter(s) on FaceTime if he desires.

## 2023-07-10 NOTE — HISTORY OF PRESENT ILLNESS
[TextBox_4] : 11/2/2022 [[place]]: PMD Dr Alvarez. 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 Santos for evaluation of cough. 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 Says he now has an intermittent cough in the morning after he eats, and at night when he is lying down. Does not experience shortness of breath, last played basketball 2 weeks ago (will intermittently need albuterol inhaler while playing). Says every once in a blue moon he experiences possible wheezing and chest tightness. Has no difficulties sleeping does not wake up coughing, no tickle in back of throat. Recived flu vaccine, covid booster and believes he received covid vaccine. No hx of tb, night-sweats or weight changes. \par \par 1/11/23 [Meghana]: presents today for follow up on abnormal CT of the Chest taken 12/2022. Was having some wheezing and his PCP ordered a CT chest to evaluate lung parenchyma. Very distant smoker. Some dust at home, but no mold. No pets or other exposures at work. 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 Has been on losartan since. Also takes metoprolol and atorvostatin everyday. \par \par 6/7/2023 [Meghana]: here today for cough. Cough has been going on for months - productive of clear white sputum, has not gotten any better or worse since it startd. No new medications or changes. Has associated nasal congestion and PND. Symptoms also worse after eating meals and laying flat. No fever or chills or infectious symptoms. Using advair religiously BID, feels like this helps somewhat. Otherwise continues to be active, playing basketball routinely. No interim hospitalizations of ER/UC visits. \par \par 7/10/2023: Comes in for follow up and in context of ongoing cough. We spoke to his daughter on FaceTime for much of the visit as well. He did add the nasal spray and PPI at our last visit so has been on these for a month in addition to Advair bid. Cough remains worse after eating. No dyspnea, still playing basketball.

## 2023-10-04 ENCOUNTER — APPOINTMENT (OUTPATIENT)
Dept: PULMONOLOGY | Facility: CLINIC | Age: 78
End: 2023-10-04
Payer: MEDICARE

## 2023-10-04 VITALS
TEMPERATURE: 98.2 F | DIASTOLIC BLOOD PRESSURE: 80 MMHG | HEART RATE: 80 BPM | OXYGEN SATURATION: 95 % | WEIGHT: 155 LBS | BODY MASS INDEX: 25.02 KG/M2 | SYSTOLIC BLOOD PRESSURE: 126 MMHG

## 2023-10-04 PROCEDURE — 99214 OFFICE O/P EST MOD 30 MIN: CPT

## 2023-10-13 ENCOUNTER — APPOINTMENT (OUTPATIENT)
Dept: OTOLARYNGOLOGY | Facility: CLINIC | Age: 78
End: 2023-10-13
Payer: MEDICARE

## 2023-10-13 VITALS
DIASTOLIC BLOOD PRESSURE: 84 MMHG | HEART RATE: 76 BPM | TEMPERATURE: 96.8 F | SYSTOLIC BLOOD PRESSURE: 144 MMHG | BODY MASS INDEX: 24.75 KG/M2 | WEIGHT: 154 LBS | HEIGHT: 66 IN

## 2023-10-13 PROCEDURE — 31231 NASAL ENDOSCOPY DX: CPT

## 2023-10-13 PROCEDURE — 99204 OFFICE O/P NEW MOD 45 MIN: CPT | Mod: 25

## 2023-10-13 RX ORDER — FAMOTIDINE 40 MG/1
40 TABLET, FILM COATED ORAL
Qty: 30 | Refills: 2 | Status: ACTIVE | COMMUNITY
Start: 2023-10-13 | End: 1900-01-01

## 2023-10-19 ENCOUNTER — NON-APPOINTMENT (OUTPATIENT)
Age: 78
End: 2023-10-19

## 2023-10-19 ENCOUNTER — APPOINTMENT (OUTPATIENT)
Dept: HEART AND VASCULAR | Facility: CLINIC | Age: 78
End: 2023-10-19
Payer: MEDICARE

## 2023-10-19 VITALS
OXYGEN SATURATION: 95 % | WEIGHT: 153.99 LBS | BODY MASS INDEX: 24.75 KG/M2 | HEART RATE: 82 BPM | DIASTOLIC BLOOD PRESSURE: 72 MMHG | HEIGHT: 66 IN | SYSTOLIC BLOOD PRESSURE: 120 MMHG

## 2023-10-19 DIAGNOSIS — Z95.1 PRESENCE OF AORTOCORONARY BYPASS GRAFT: ICD-10-CM

## 2023-10-19 PROCEDURE — 93000 ELECTROCARDIOGRAM COMPLETE: CPT

## 2023-10-19 PROCEDURE — 99214 OFFICE O/P EST MOD 30 MIN: CPT | Mod: 25

## 2023-10-19 RX ORDER — DAPAGLIFLOZIN 10 MG/1
10 TABLET, FILM COATED ORAL
Qty: 90 | Refills: 2 | Status: ACTIVE | COMMUNITY
Start: 2021-03-11

## 2023-10-19 RX ORDER — EZETIMIBE 10 MG/1
10 TABLET ORAL
Qty: 90 | Refills: 3 | Status: ACTIVE | COMMUNITY
Start: 2023-10-19 | End: 1900-01-01

## 2023-10-19 RX ORDER — MULTIVIT-MIN/FOLIC/VIT K/LYCOP 400-300MCG
50 MCG TABLET ORAL
Refills: 0 | Status: ACTIVE | COMMUNITY

## 2023-10-22 ENCOUNTER — OUTPATIENT (OUTPATIENT)
Dept: OUTPATIENT SERVICES | Facility: HOSPITAL | Age: 78
LOS: 1 days | End: 2023-10-22
Payer: MEDICARE

## 2023-10-22 ENCOUNTER — APPOINTMENT (OUTPATIENT)
Dept: CT IMAGING | Facility: HOSPITAL | Age: 78
End: 2023-10-22

## 2023-10-22 DIAGNOSIS — Z96.651 PRESENCE OF RIGHT ARTIFICIAL KNEE JOINT: Chronic | ICD-10-CM

## 2023-10-22 DIAGNOSIS — Z98.890 OTHER SPECIFIED POSTPROCEDURAL STATES: Chronic | ICD-10-CM

## 2023-10-22 DIAGNOSIS — Z98.49 CATARACT EXTRACTION STATUS, UNSPECIFIED EYE: Chronic | ICD-10-CM

## 2023-10-22 PROCEDURE — 70486 CT MAXILLOFACIAL W/O DYE: CPT | Mod: 26

## 2023-10-22 PROCEDURE — 70486 CT MAXILLOFACIAL W/O DYE: CPT

## 2023-11-28 ENCOUNTER — APPOINTMENT (OUTPATIENT)
Dept: OTOLARYNGOLOGY | Facility: CLINIC | Age: 78
End: 2023-11-28
Payer: MEDICARE

## 2023-11-28 PROCEDURE — 99213 OFFICE O/P EST LOW 20 MIN: CPT

## 2023-12-01 ENCOUNTER — RX RENEWAL (OUTPATIENT)
Age: 78
End: 2023-12-01

## 2023-12-01 RX ORDER — PANTOPRAZOLE 40 MG/1
40 TABLET, DELAYED RELEASE ORAL
Qty: 30 | Refills: 3 | Status: COMPLETED | COMMUNITY
Start: 2023-07-27 | End: 2023-12-01

## 2023-12-06 ENCOUNTER — OUTPATIENT (OUTPATIENT)
Dept: OUTPATIENT SERVICES | Facility: HOSPITAL | Age: 78
LOS: 1 days | End: 2023-12-06
Payer: MEDICARE

## 2023-12-06 ENCOUNTER — APPOINTMENT (OUTPATIENT)
Dept: CT IMAGING | Facility: HOSPITAL | Age: 78
End: 2023-12-06

## 2023-12-06 DIAGNOSIS — Z98.49 CATARACT EXTRACTION STATUS, UNSPECIFIED EYE: Chronic | ICD-10-CM

## 2023-12-06 DIAGNOSIS — Z98.890 OTHER SPECIFIED POSTPROCEDURAL STATES: Chronic | ICD-10-CM

## 2023-12-06 DIAGNOSIS — Z96.651 PRESENCE OF RIGHT ARTIFICIAL KNEE JOINT: Chronic | ICD-10-CM

## 2023-12-06 PROCEDURE — 71250 CT THORAX DX C-: CPT | Mod: 26

## 2023-12-06 PROCEDURE — 71250 CT THORAX DX C-: CPT

## 2023-12-18 ENCOUNTER — APPOINTMENT (OUTPATIENT)
Dept: PULMONOLOGY | Facility: CLINIC | Age: 78
End: 2023-12-18
Payer: MEDICARE

## 2023-12-18 VITALS
HEART RATE: 63 BPM | OXYGEN SATURATION: 94 % | SYSTOLIC BLOOD PRESSURE: 120 MMHG | TEMPERATURE: 97.2 F | WEIGHT: 153 LBS | BODY MASS INDEX: 24.59 KG/M2 | HEIGHT: 66 IN | DIASTOLIC BLOOD PRESSURE: 90 MMHG

## 2023-12-18 PROCEDURE — 94727 GAS DIL/WSHOT DETER LNG VOL: CPT

## 2023-12-18 PROCEDURE — 94729 DIFFUSING CAPACITY: CPT

## 2023-12-18 PROCEDURE — 94060 EVALUATION OF WHEEZING: CPT

## 2023-12-18 PROCEDURE — 95012 NITRIC OXIDE EXP GAS DETER: CPT

## 2023-12-18 PROCEDURE — 99214 OFFICE O/P EST MOD 30 MIN: CPT | Mod: 25

## 2023-12-18 RX ORDER — ALBUTEROL SULFATE 2.5 MG/3ML
(2.5 MG/3ML) SOLUTION RESPIRATORY (INHALATION)
Qty: 1 | Refills: 2 | Status: ACTIVE | COMMUNITY
Start: 2023-12-18 | End: 1900-01-01

## 2023-12-18 RX ORDER — NEBULIZER ACCESSORIES
KIT MISCELLANEOUS
Qty: 1 | Refills: 0 | Status: ACTIVE | COMMUNITY
Start: 2023-12-18 | End: 1900-01-01

## 2023-12-18 NOTE — HISTORY OF PRESENT ILLNESS
[TextBox_4] : 11/2/2022 [[place]]: PMD Dr Alvarez. 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 Santos for evaluation of cough. 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 Says he now has an intermittent cough in the morning after he eats, and at night when he is lying down. Does not experience shortness of breath, last played basketball 2 weeks ago (will intermittently need albuterol inhaler while playing). Says every once in a blue moon he experiences possible wheezing and chest tightness. Has no difficulties sleeping does not wake up coughing, no tickle in back of throat. Recived flu vaccine, covid booster and believes he received covid vaccine. No hx of tb, night-sweats or weight changes.  1/11/23 [Meghana]: presents today for follow up on abnormal CT of the Chest taken 12/2022. Was having some wheezing and his PCP ordered a CT chest to evaluate lung parenchyma. Very distant smoker. Some dust at home, but no mold. No pets or other exposures at work. 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 Has been on losartan since. Also takes metoprolol and atorvostatin everyday.  6/7/2023 [Meghana]: here today for cough. Cough has been going on for months - productive of clear white sputum, has not gotten any better or worse since it startd. No new medications or changes. Has associated nasal congestion and PND. Symptoms also worse after eating meals and laying flat. No fever or chills or infectious symptoms. Using advair religiously BID, feels like this helps somewhat. Otherwise continues to be active, playing basketball routinely. No interim hospitalizations of ER/UC visits.  7/10/2023: Comes in for follow up and in context of ongoing cough. We spoke to his daughter on FaceTime for much of the visit as well. He did add the nasal spray and PPI at our last visit so has been on these for a month in addition to Advair bid. Cough remains worse after eating. No dyspnea, still playing basketball.  10/4/2023: Cough is a little better. Active, not much dyspnea. On Advair MDI, on Flonase, and pantoprazole. The nighttime cough is definitely better. Less cough during day but still present. Not that interested in adding more meds; would rather use fewer meds.  12/18/2023: We had our visit today with his daughter Yeni on FaceTime. In interim saw Dr Jensen who found no real upper airway disease contributing to cough; tried adding H2B to PPI but this did not help so it was stopped. Still coughing, no better or worse, some wheeze. On Advair 45 bid. Getting fatigued with walking, maybe a little worse than it has been.

## 2023-12-18 NOTE — CONSULT LETTER
[Dear  ___] : Dear  [unfilled], [Courtesy Letter:] : I had the pleasure of seeing your patient, [unfilled], in my office today. [Please see my note below.] : Please see my note below. [Consult Closing:] : Thank you very much for allowing me to participate in the care of this patient.  If you have any questions, please do not hesitate to contact me. [Sincerely,] : Sincerely, [FreeTextEntry2] : Satya Alvarez MD\par  [address] \[place] [address]\par   [FreeTextEntry3] : Anastasia Hess MD, FCCP\par

## 2024-02-07 ENCOUNTER — NON-APPOINTMENT (OUTPATIENT)
Age: 79
End: 2024-02-07

## 2024-02-07 DIAGNOSIS — R50.9 FEVER, UNSPECIFIED: ICD-10-CM

## 2024-02-26 RX ORDER — LOSARTAN POTASSIUM 25 MG/1
25 TABLET, FILM COATED ORAL
Qty: 90 | Refills: 1 | Status: ACTIVE | COMMUNITY
Start: 2022-11-02 | End: 1900-01-01

## 2024-02-26 RX ORDER — PANTOPRAZOLE 40 MG/1
40 TABLET, DELAYED RELEASE ORAL DAILY
Qty: 30 | Refills: 5 | Status: ACTIVE | COMMUNITY
Start: 2023-06-07 | End: 1900-01-01

## 2024-03-07 ENCOUNTER — APPOINTMENT (OUTPATIENT)
Dept: PULMONOLOGY | Facility: CLINIC | Age: 79
End: 2024-03-07
Payer: MEDICARE

## 2024-03-07 VITALS
OXYGEN SATURATION: 95 % | HEART RATE: 75 BPM | WEIGHT: 148 LBS | HEIGHT: 65 IN | TEMPERATURE: 96.5 F | BODY MASS INDEX: 24.66 KG/M2 | DIASTOLIC BLOOD PRESSURE: 70 MMHG | SYSTOLIC BLOOD PRESSURE: 110 MMHG

## 2024-03-07 DIAGNOSIS — J84.9 INTERSTITIAL PULMONARY DISEASE, UNSPECIFIED: ICD-10-CM

## 2024-03-07 PROCEDURE — 94729 DIFFUSING CAPACITY: CPT

## 2024-03-07 PROCEDURE — 94060 EVALUATION OF WHEEZING: CPT

## 2024-03-07 PROCEDURE — 94727 GAS DIL/WSHOT DETER LNG VOL: CPT

## 2024-03-07 PROCEDURE — 99214 OFFICE O/P EST MOD 30 MIN: CPT | Mod: 25

## 2024-03-11 RX ORDER — FLUTICASONE PROPIONATE AND SALMETEROL XINAFOATE 115; 21 UG/1; UG/1
115-21 AEROSOL, METERED RESPIRATORY (INHALATION)
Qty: 1 | Refills: 5 | Status: ACTIVE | COMMUNITY
Start: 2023-12-18 | End: 1900-01-01

## 2024-04-15 RX ORDER — FLUTICASONE FUROATE AND VILANTEROL TRIFENATATE 100; 25 UG/1; UG/1
100-25 POWDER RESPIRATORY (INHALATION)
Qty: 3 | Refills: 3 | Status: ACTIVE | COMMUNITY
Start: 2024-04-15 | End: 1900-01-01

## 2024-04-18 ENCOUNTER — APPOINTMENT (OUTPATIENT)
Dept: HEART AND VASCULAR | Facility: CLINIC | Age: 79
End: 2024-04-18
Payer: MEDICARE

## 2024-04-18 ENCOUNTER — NON-APPOINTMENT (OUTPATIENT)
Age: 79
End: 2024-04-18

## 2024-04-18 VITALS
HEIGHT: 64 IN | HEART RATE: 73 BPM | SYSTOLIC BLOOD PRESSURE: 110 MMHG | TEMPERATURE: 97.6 F | OXYGEN SATURATION: 95 % | WEIGHT: 145.99 LBS | DIASTOLIC BLOOD PRESSURE: 66 MMHG | BODY MASS INDEX: 24.92 KG/M2

## 2024-04-18 DIAGNOSIS — I25.10 ATHEROSCLEROTIC HEART DISEASE OF NATIVE CORONARY ARTERY W/OUT ANGINA PECTORIS: ICD-10-CM

## 2024-04-18 DIAGNOSIS — R05.9 COUGH, UNSPECIFIED: ICD-10-CM

## 2024-04-18 PROCEDURE — 36415 COLL VENOUS BLD VENIPUNCTURE: CPT

## 2024-04-18 PROCEDURE — 99214 OFFICE O/P EST MOD 30 MIN: CPT

## 2024-04-18 PROCEDURE — G2211 COMPLEX E/M VISIT ADD ON: CPT

## 2024-04-18 PROCEDURE — 93000 ELECTROCARDIOGRAM COMPLETE: CPT

## 2024-04-19 LAB
ALBUMIN SERPL ELPH-MCNC: 4.4 G/DL
ALP BLD-CCNC: 79 U/L
ALT SERPL-CCNC: 27 U/L
ANION GAP SERPL CALC-SCNC: 17 MMOL/L
AST SERPL-CCNC: 32 U/L
BILIRUB SERPL-MCNC: 0.4 MG/DL
BUN SERPL-MCNC: 22 MG/DL
CALCIUM SERPL-MCNC: 9.8 MG/DL
CHLORIDE SERPL-SCNC: 98 MMOL/L
CHOLEST SERPL-MCNC: 101 MG/DL
CO2 SERPL-SCNC: 21 MMOL/L
CREAT SERPL-MCNC: 1.02 MG/DL
CREAT SPEC-SCNC: 63 MG/DL
EGFR: 75 ML/MIN/1.73M2
ESTIMATED AVERAGE GLUCOSE: 180 MG/DL
GLUCOSE SERPL-MCNC: 170 MG/DL
HBA1C MFR BLD HPLC: 7.9 %
HDLC SERPL-MCNC: 39 MG/DL
LDLC SERPL CALC-MCNC: 43 MG/DL
MICROALBUMIN 24H UR DL<=1MG/L-MCNC: <1.2 MG/DL
MICROALBUMIN/CREAT 24H UR-RTO: NORMAL MG/G
NONHDLC SERPL-MCNC: 61 MG/DL
POTASSIUM SERPL-SCNC: 4.8 MMOL/L
PROT SERPL-MCNC: 7.7 G/DL
SODIUM SERPL-SCNC: 136 MMOL/L
TRIGL SERPL-MCNC: 97 MG/DL

## 2024-04-25 NOTE — PHYSICAL EXAM
[Well Developed] : well developed [Well Nourished] : well nourished [No Acute Distress] : no acute distress [Normal Venous Pressure] : normal venous pressure [No Carotid Bruit] : no carotid bruit [Normal S1, S2] : normal S1, S2 [No Murmur] : no murmur [No Rub] : no rub [No Gallop] : no gallop [Clear Lung Fields] : clear lung fields [Good Air Entry] : good air entry [No Respiratory Distress] : no respiratory distress  [Soft] : abdomen soft [Non Tender] : non-tender [No Masses/organomegaly] : no masses/organomegaly [Normal Bowel Sounds] : normal bowel sounds [Normal Gait] : normal gait [No Edema] : no edema [No Cyanosis] : no cyanosis [No Clubbing] : no clubbing [No Varicosities] : no varicosities [No Rash] : no rash [No Skin Lesions] : no skin lesions [Moves all extremities] : moves all extremities [No Focal Deficits] : no focal deficits [Normal Speech] : normal speech [Alert and Oriented] : alert and oriented [Normal memory] : normal memory [General Appearance - Well Developed] : well developed [Normal Appearance] : normal appearance [Well Groomed] : well groomed [General Appearance - Well Nourished] : well nourished [No Deformities] : no deformities [General Appearance - In No Acute Distress] : no acute distress [Normal Conjunctiva] : the conjunctiva exhibited no abnormalities [Eyelids - No Xanthelasma] : the eyelids demonstrated no xanthelasmas [Normal Oral Mucosa] : normal oral mucosa [No Oral Pallor] : no oral pallor [No Oral Cyanosis] : no oral cyanosis [Normal Jugular Venous A Waves Present] : normal jugular venous A waves present [Normal Jugular Venous V Waves Present] : normal jugular venous V waves present [No Jugular Venous Cummins A Waves] : no jugular venous cummins A waves [Respiration, Rhythm And Depth] : normal respiratory rhythm and effort [Exaggerated Use Of Accessory Muscles For Inspiration] : no accessory muscle use [Auscultation Breath Sounds / Voice Sounds] : lungs were clear to auscultation bilaterally [Heart Rate And Rhythm] : heart rate and rhythm were normal [Heart Sounds] : normal S1 and S2 [Murmurs] : no murmurs present [Abdomen Soft] : soft [Abdomen Tenderness] : non-tender [Abnormal Walk] : normal gait [Abdomen Mass (___ Cm)] : no abdominal mass palpated [Gait - Sufficient For Exercise Testing] : the gait was sufficient for exercise testing [Nail Clubbing] : no clubbing of the fingernails [Cyanosis, Localized] : no localized cyanosis [Petechial Hemorrhages (___cm)] : no petechial hemorrhages [Skin Color & Pigmentation] : normal skin color and pigmentation [] : no rash [No Venous Stasis] : no venous stasis [Skin Lesions] : no skin lesions [No Skin Ulcers] : no skin ulcer [No Xanthoma] : no  xanthoma was observed [Oriented To Time, Place, And Person] : oriented to person, place, and time [Affect] : the affect was normal [Mood] : the mood was normal [No Anxiety] : not feeling anxious [FreeTextEntry1] : mild wheeze

## 2024-04-25 NOTE — ASSESSMENT
[FreeTextEntry1] : - sob likely driven by ILD better with breo  - LDL not at goal add ezetimibe 10 mg daily cotntinue asa 81 mg daily and atorvastatin 80 mg daily - DM on Farxiga 10 mg daily and metformin 100- mg bid - CAD on metoprolol 25 mg daily for angina in the past can consider stopping it next visit - fu in six months

## 2024-04-25 NOTE — HISTORY OF PRESENT ILLNESS
[FreeTextEntry1] : 77 M DM H/o  inferior wall 8/2019 MI EF 40%, s/p CABG echo in 1/2020 EF is now normal Interstitial Lung disease Anemia s/p IV iron   here for fu he has stable sob feels better with Breo.  no exertional chest pain.     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

## 2024-04-25 NOTE — PROCEDURE
[Normal] : Normal ECG response to treadmill exercise. [ECG Atrial Arrhythmias] : no arrhythmias [de-identified] : Tom [de-identified] : [facility] [de-identified] : 64 [de-identified] : 120/60 [de-identified] : 3:00 [de-identified] : 111 [de-identified] : 150/60 [de-identified] : 4.7 METS; 77 [de-identified] : Poor exercise tolerance [TWNoteComboBox2] : Yosef [TWNoteComboBox1] : ROYAL [TWNoteComboBox4] : fatigue [TWNoteComboBox3] : 1 [TWNoteComboBox5] : no chest pain

## 2024-04-25 NOTE — REASON FOR VISIT
[Coronary Artery Disease] : coronary artery disease [Follow-Up - Clinic] : a clinic follow-up of [FreeTextEntry3] : Dr Casimiro Alvarez  [FreeTextEntry2] : [facility]

## 2024-06-10 ENCOUNTER — APPOINTMENT (OUTPATIENT)
Dept: PULMONOLOGY | Facility: CLINIC | Age: 79
End: 2024-06-10
Payer: MEDICARE

## 2024-06-10 VITALS
TEMPERATURE: 97.1 F | SYSTOLIC BLOOD PRESSURE: 133 MMHG | BODY MASS INDEX: 23.14 KG/M2 | OXYGEN SATURATION: 93 % | HEART RATE: 69 BPM | HEIGHT: 66 IN | WEIGHT: 144 LBS | DIASTOLIC BLOOD PRESSURE: 71 MMHG

## 2024-06-10 PROCEDURE — 94729 DIFFUSING CAPACITY: CPT

## 2024-06-10 PROCEDURE — 94727 GAS DIL/WSHOT DETER LNG VOL: CPT

## 2024-06-10 PROCEDURE — 94010 BREATHING CAPACITY TEST: CPT

## 2024-06-10 PROCEDURE — 99214 OFFICE O/P EST MOD 30 MIN: CPT | Mod: 25

## 2024-06-10 NOTE — ASSESSMENT
[FreeTextEntry1] : Data reviewed:  Labs 2023: CLAUDETTE neg, HP panel pos, RF 90  CBC 2019 eos 960/ul  CT chest [facility] 12/6/2023 personally reviewed : Since December 29, 2022, unchanged fibrotic interstitial lung disease, base features suggestive of alternate diagnoses to UIP. Differential diagnosis includes fibrotic NSIP, chronic organizing pneumonia, possibly sequela of Covid 19 infection or less likely sarcoidosis.  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 He appears to have an alternate dx to UIP and I would consider empiric steroids, +/- bronch though favor bronch. First I will have him see rheum given the positive RF. Then will determine treatment plan. Elevated HbA1c of concern re steroids. Stay on medium dose ICS/LABA, cheapest covered alternative. See me after rheum.

## 2024-06-10 NOTE — PHYSICAL EXAM
[Normal Rate/Rhythm] : normal rate/rhythm [Normal S1, S2] : normal s1, s2 [No Murmurs] : no murmurs [No Resp Distress] : no resp distress [Clear to Auscultation Bilaterally] : clear to auscultation bilaterally [TextBox_2] : he is in no distress at all during this visit

## 2024-06-10 NOTE — HISTORY OF PRESENT ILLNESS
[Former] : former [TextBox_4] : 11/2/2022 [[place]]: PMD Dr Alvarez. 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 Santos for evaluation of cough. 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 Says he now has an intermittent cough in the morning after he eats, and at night when he is lying down. Does not experience shortness of breath, last played basketball 2 weeks ago (will intermittently need albuterol inhaler while playing). Says every once in a blue moon he experiences possible wheezing and chest tightness. Has no difficulties sleeping does not wake up coughing, no tickle in back of throat. Recived flu vaccine, covid booster and believes he received covid vaccine. No hx of tb, night-sweats or weight changes.  1/11/23 [Meghana]: presents today for follow up on abnormal CT of the Chest taken 12/2022. Was having some wheezing and his PCP ordered a CT chest to evaluate lung parenchyma. Very distant smoker. Some dust at home, but no mold. No pets or other exposures at work. 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 Has been on losartan since. Also takes metoprolol and atorvostatin everyday.  6/7/2023 [Meghana]: here today for cough. Cough has been going on for months - productive of clear white sputum, has not gotten any better or worse since it started. No new medications or changes. Has associated nasal congestion and PND. Symptoms also worse after eating meals and laying flat. No fever or chills or infectious symptoms. Using advair religiously BID, feels like this helps somewhat. Otherwise continues to be active, playing basketball routinely. No interim hospitalizations of ER/UC visits.  7/10/2023: Comes in for follow up and in context of ongoing cough. We spoke to his daughter on FaceTime for much of the visit as well. He did add the nasal spray and PPI at our last visit so has been on these for a month in addition to Advair bid. Cough remains worse after eating. No dyspnea, still playing basketball.  10/4/2023: Cough is a little better. Active, not much dyspnea. On Advair MDI, on Flonase, and pantoprazole. The nighttime cough is definitely better. Less cough during day but still present. Not that interested in adding more meds; would rather use fewer meds.  12/18/2023: We had our visit today with his daughter Pancho on FaceTime. In interim saw Dr Jensen who found no real upper airway disease contributing to cough; tried adding H2B to PPI but this did not help so it was stopped. Still coughing, no better or worse, some wheeze. On Advair 45 bid. Getting fatigued with walking, maybe a little worse than it has been.  3/7/2024: Pancho on speaker phone. He found that the Advair 115 was better for his cough but his insurance would not cover it and so he's back on the 45. (Why? Brand vs generic?) Breathing is ok, stable. But his daughter thinks he's uncomfortable with coughing and she thinks he's more dyspneic than he says. She describes him being dyspneic with speaking. She feels that she can hear him being in distress during this visit. He on the other hand reports continuing to play basketball. He did have influenza 6 weeks ago treated at [facility] and felt better after Tamiflu.  6/10/2024: Pancho on FaceTime. Sometimes a little dyspneic. Cough persists, Some congestion in chest. He is on Breo 100 and this worked well. The first time he filled it he got 3 inhalers for free. But the second time he had to pay so he didn't fill it, but for reasons I do not understand he filled Advair and paid for it. Recent HbA1c is 7.9.

## 2024-06-10 NOTE — CONSULT LETTER
[Dear  ___] : Dear  [unfilled], [Courtesy Letter:] : I had the pleasure of seeing your patient, [unfilled], in my office today. [Please see my note below.] : Please see my note below. [Consult Closing:] : Thank you very much for allowing me to participate in the care of this patient.  If you have any questions, please do not hesitate to contact me. [Sincerely,] : Sincerely, [FreeTextEntry2] : Satya Alvarez MD\par  [address] \[place] [address]\par   [FreeTextEntry3] : Anastasia Hess MD, FCCP\par

## 2024-06-10 NOTE — CONSULT LETTER
[Dear  ___] : Dear  [unfilled], [Courtesy Letter:] : I had the pleasure of seeing your patient, [unfilled], in my office today. [Consult Closing:] : Thank you very much for allowing me to participate in the care of this patient.  If you have any questions, please do not hesitate to contact me. [Please see my note below.] : Please see my note below. [Sincerely,] : Sincerely, [FreeTextEntry2] : Satya Alvarez MD\par  [address] \[place] [address]\par   [FreeTextEntry3] : Anastasia Hess MD, FCCP\par

## 2024-06-10 NOTE — PHYSICAL EXAM
[Normal Rate/Rhythm] : normal rate/rhythm [Normal S1, S2] : normal s1, s2 [No Murmurs] : no murmurs [No Resp Distress] : no resp distress [Clear to Auscultation Bilaterally] : clear to auscultation bilaterally

## 2024-06-10 NOTE — ASSESSMENT
[FreeTextEntry1] : Data reviewed:  CBC 2019 eos 960/ul  CT chest [facility] 12/6/2023 personally reviewed : Since December 29, 2022, unchanged fibrotic interstitial lung disease, base features suggestive of alternate diagnoses to UIP. Differential diagnosis includes fibrotic NSIP, chronic organizing pneumonia, possibly sequela of Covid 19 infection or less likely sarcoidosis.  PFT 1/13/23: FVC 3.05L (97%), FEV1 2.39L (102%), FEV1/FVC 79 (108%), TLC 4.46 (75%), DLCO 14.02 (68%) PFT 7/10/2023: FVC 3.16L (101%), FEV1 2.19L (94%), FEV1/FVC 69%, TLC 4.31L (73%), DLCO 13.07 (64%) PFT 12/18/2023: FVC 2.74L (88%), FEV1 2.13L (92%), TLC 3.90L (66%), DLCO 12.01 (59%) / FENO 5 PFT 3/7/2023: FVC 2.73L (88%), FEV1 2.27L (98%), TLC 3.96L (67%), DLCO 10.78 (53%)  Impression: ILD Chronic cough Eosinophilia  Plan: Try again to get him medium dose Advair - rx'd as MIRLANDE brand name. If not covered will change to a covered medium dose product. There's a big disparity between the patient and daughter in terms of how symptomatic he is. His PFT is pretty stable. I am going to continue with close monitoring and repeat a PFT in 3 mos again. Daughter wants to get him a pulse ox - counseled her about how to correctly use this.

## 2024-06-10 NOTE — HISTORY OF PRESENT ILLNESS
[Former] : former [TextBox_4] : 11/2/2022 [[place]]: PMD Dr Alvarez. 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 Santos for evaluation of cough. 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 Says he now has an intermittent cough in the morning after he eats, and at night when he is lying down. Does not experience shortness of breath, last played basketball 2 weeks ago (will intermittently need albuterol inhaler while playing). Says every once in a blue moon he experiences possible wheezing and chest tightness. Has no difficulties sleeping does not wake up coughing, no tickle in back of throat. Recived flu vaccine, covid booster and believes he received covid vaccine. No hx of tb, night-sweats or weight changes.  1/11/23 [Meghana]: presents today for follow up on abnormal CT of the Chest taken 12/2022. Was having some wheezing and his PCP ordered a CT chest to evaluate lung parenchyma. Very distant smoker. Some dust at home, but no mold. No pets or other exposures at work. 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 Has been on losartan since. Also takes metoprolol and atorvostatin everyday.  6/7/2023 [Meghana]: here today for cough. Cough has been going on for months - productive of clear white sputum, has not gotten any better or worse since it started. No new medications or changes. Has associated nasal congestion and PND. Symptoms also worse after eating meals and laying flat. No fever or chills or infectious symptoms. Using advair religiously BID, feels like this helps somewhat. Otherwise continues to be active, playing basketball routinely. No interim hospitalizations of ER/UC visits.  7/10/2023: Comes in for follow up and in context of ongoing cough. We spoke to his daughter on FaceTime for much of the visit as well. He did add the nasal spray and PPI at our last visit so has been on these for a month in addition to Advair bid. Cough remains worse after eating. No dyspnea, still playing basketball.  10/4/2023: Cough is a little better. Active, not much dyspnea. On Advair MDI, on Flonase, and pantoprazole. The nighttime cough is definitely better. Less cough during day but still present. Not that interested in adding more meds; would rather use fewer meds.  12/18/2023: We had our visit today with his daughter Pancho on FaceTime. In interim saw Dr Jensen who found no real upper airway disease contributing to cough; tried adding H2B to PPI but this did not help so it was stopped. Still coughing, no better or worse, some wheeze. On Advair 45 bid. Getting fatigued with walking, maybe a little worse than it has been.  3/7/2024: Pancho on speaker phone. He found that the Advair 115 was better for his cough but his insurance would not cover it and so he's back on the 45. (Why? Brand vs generic?) Breathing is ok, stable. But his daughter thinks he's uncomfortable with coughing and she thinks he's more dyspneic than he says. She describes him being dyspneic with speaking. She feels that she can hear him being in distress during this visit. He on the other hand reports continuing to play basketball. He did [place] 6 weeks [place] [facility] [place] felt better after Tamiflu.

## 2024-08-04 NOTE — PROCEDURE NOTE - NSSITEPREP_SKIN_A_CORE
Adherence to aseptic technique: hand hygiene prior to donning barriers (gown, gloves), don cap and mask, sterile drape over patient/chlorhexidine
[Negative] : Heme/Lymph

## 2024-08-14 ENCOUNTER — RX RENEWAL (OUTPATIENT)
Age: 79
End: 2024-08-14

## 2024-09-06 ENCOUNTER — RX RENEWAL (OUTPATIENT)
Age: 79
End: 2024-09-06

## 2024-09-15 NOTE — ASSESSMENT
[FreeTextEntry1] : Data reviewed:  CBC 2019 eos 960/ul  CT chest [facility] 12/6/2023 personally reviewed : Since December 29, 2022, unchanged fibrotic interstitial lung disease, base features suggestive of alternate diagnoses to UIP. Differential diagnosis includes fibrotic NSIP, chronic organizing pneumonia, possibly sequela of Covid 19 infection or less likely sarcoidosis.  PFT 1/13/23: FVC 3.05L (97%), FEV1 2.39L (102%), FEV1/FVC 79 (108%), TLC 4.46 (75%), DLCO 14.02 (68%) PFT 7/10/2023: FVC 3.16L (101%), FEV1 2.19L (94%), FEV1/FVC 69%, TLC 4.31L (73%), DLCO 13.07 (64%) PFT 12/18/2023: FVC 2.74L (88%), FEV1 2.13L (92%), TLC 3.90L (66%), DLCO 12.01 (59%) / FENO 5  Impression: ILD Chronic cough Eosinophilia  Plan: There are two separate issues here, the cough and the ILD. Even if the cough is due to the ILD, antifibrotics will not be expected to improve this. He does have the family hx of asthma and the peripheral eosinophilia. I will try increasing him to medium dose Advair, and at his daughter's request will rx nebulizer for use during URIs. For the ILD, we discussed what to do. His PFT is down a bit. CT roughly stable, maybe slightly worse to my eye. Antifibrotics will not improve day to day QOL and he favors less meds. We will repeat a PFT in 3 mos and discuss further; if PFT declines again this will favor antifibrotics, perhaps after a trial of empiric steroids given that this is not UIP pattern. Scribe Attestation (For Scribes USE Only)... Attending Attestation (For Attendings USE Only).../Scribe Attestation (For Scribes USE Only)...

## 2024-10-15 ENCOUNTER — APPOINTMENT (OUTPATIENT)
Dept: HEART AND VASCULAR | Facility: CLINIC | Age: 79
End: 2024-10-15
Payer: MEDICARE

## 2024-10-15 ENCOUNTER — NON-APPOINTMENT (OUTPATIENT)
Age: 79
End: 2024-10-15

## 2024-10-15 VITALS
TEMPERATURE: 97.9 F | SYSTOLIC BLOOD PRESSURE: 98 MMHG | BODY MASS INDEX: 23.14 KG/M2 | HEIGHT: 66 IN | OXYGEN SATURATION: 95 % | HEART RATE: 71 BPM | DIASTOLIC BLOOD PRESSURE: 70 MMHG | WEIGHT: 143.98 LBS

## 2024-10-15 DIAGNOSIS — I25.10 ATHEROSCLEROTIC HEART DISEASE OF NATIVE CORONARY ARTERY W/OUT ANGINA PECTORIS: ICD-10-CM

## 2024-10-15 PROCEDURE — 36415 COLL VENOUS BLD VENIPUNCTURE: CPT

## 2024-10-15 PROCEDURE — 99214 OFFICE O/P EST MOD 30 MIN: CPT

## 2024-10-15 PROCEDURE — G2211 COMPLEX E/M VISIT ADD ON: CPT

## 2024-10-15 PROCEDURE — 93000 ELECTROCARDIOGRAM COMPLETE: CPT

## 2024-11-15 ENCOUNTER — APPOINTMENT (OUTPATIENT)
Dept: PULMONOLOGY | Facility: CLINIC | Age: 79
End: 2024-11-15
Payer: MEDICARE

## 2024-11-15 ENCOUNTER — NON-APPOINTMENT (OUTPATIENT)
Age: 79
End: 2024-11-15

## 2024-11-15 VITALS
SYSTOLIC BLOOD PRESSURE: 122 MMHG | HEART RATE: 65 BPM | TEMPERATURE: 98 F | HEIGHT: 66 IN | WEIGHT: 153 LBS | OXYGEN SATURATION: 92 % | BODY MASS INDEX: 24.59 KG/M2 | DIASTOLIC BLOOD PRESSURE: 80 MMHG

## 2024-11-15 DIAGNOSIS — J84.9 INTERSTITIAL PULMONARY DISEASE, UNSPECIFIED: ICD-10-CM

## 2024-11-15 PROCEDURE — 99213 OFFICE O/P EST LOW 20 MIN: CPT

## 2024-11-15 PROCEDURE — G2211 COMPLEX E/M VISIT ADD ON: CPT

## 2024-11-22 ENCOUNTER — APPOINTMENT (OUTPATIENT)
Dept: CT IMAGING | Facility: CLINIC | Age: 79
End: 2024-11-22

## 2024-11-22 PROCEDURE — 71250 CT THORAX DX C-: CPT

## 2025-01-04 ENCOUNTER — RX RENEWAL (OUTPATIENT)
Age: 80
End: 2025-01-04

## 2025-01-06 ENCOUNTER — RX RENEWAL (OUTPATIENT)
Age: 80
End: 2025-01-06

## 2025-01-07 ENCOUNTER — APPOINTMENT (OUTPATIENT)
Dept: PULMONOLOGY | Facility: CLINIC | Age: 80
End: 2025-01-07
Payer: MEDICARE

## 2025-01-07 VITALS
HEART RATE: 75 BPM | WEIGHT: 153 LBS | SYSTOLIC BLOOD PRESSURE: 116 MMHG | BODY MASS INDEX: 24.59 KG/M2 | HEIGHT: 66 IN | TEMPERATURE: 98.4 F | DIASTOLIC BLOOD PRESSURE: 74 MMHG | OXYGEN SATURATION: 95 %

## 2025-01-07 PROCEDURE — 94727 GAS DIL/WSHOT DETER LNG VOL: CPT

## 2025-01-07 PROCEDURE — 94010 BREATHING CAPACITY TEST: CPT

## 2025-01-07 PROCEDURE — 94618 PULMONARY STRESS TESTING: CPT

## 2025-01-07 PROCEDURE — 99215 OFFICE O/P EST HI 40 MIN: CPT | Mod: 25

## 2025-01-07 PROCEDURE — 94729 DIFFUSING CAPACITY: CPT

## 2025-01-07 RX ORDER — PREDNISONE 10 MG/1
10 TABLET ORAL
Qty: 90 | Refills: 1 | Status: ACTIVE | COMMUNITY
Start: 2025-01-07 | End: 1900-01-01

## 2025-02-04 ENCOUNTER — APPOINTMENT (OUTPATIENT)
Dept: PULMONOLOGY | Facility: CLINIC | Age: 80
End: 2025-02-04
Payer: MEDICARE

## 2025-02-04 VITALS
DIASTOLIC BLOOD PRESSURE: 56 MMHG | BODY MASS INDEX: 22.18 KG/M2 | HEIGHT: 66 IN | TEMPERATURE: 97 F | OXYGEN SATURATION: 91 % | SYSTOLIC BLOOD PRESSURE: 90 MMHG | WEIGHT: 138 LBS | HEART RATE: 61 BPM

## 2025-02-04 DIAGNOSIS — J84.9 INTERSTITIAL PULMONARY DISEASE, UNSPECIFIED: ICD-10-CM

## 2025-02-04 PROCEDURE — 99214 OFFICE O/P EST MOD 30 MIN: CPT

## 2025-02-04 PROCEDURE — G2211 COMPLEX E/M VISIT ADD ON: CPT

## 2025-02-13 ENCOUNTER — APPOINTMENT (OUTPATIENT)
Dept: CT IMAGING | Facility: CLINIC | Age: 80
End: 2025-02-13
Payer: MEDICARE

## 2025-02-13 PROCEDURE — 71250 CT THORAX DX C-: CPT

## 2025-02-25 ENCOUNTER — RX RENEWAL (OUTPATIENT)
Age: 80
End: 2025-02-25

## 2025-03-06 ENCOUNTER — APPOINTMENT (OUTPATIENT)
Dept: PULMONOLOGY | Facility: CLINIC | Age: 80
End: 2025-03-06
Payer: MEDICARE

## 2025-03-06 VITALS
HEIGHT: 66 IN | OXYGEN SATURATION: 95 % | HEART RATE: 80 BPM | BODY MASS INDEX: 22.18 KG/M2 | SYSTOLIC BLOOD PRESSURE: 102 MMHG | WEIGHT: 138 LBS | TEMPERATURE: 97.8 F | DIASTOLIC BLOOD PRESSURE: 60 MMHG

## 2025-03-06 DIAGNOSIS — J84.9 INTERSTITIAL PULMONARY DISEASE, UNSPECIFIED: ICD-10-CM

## 2025-03-06 PROCEDURE — 99213 OFFICE O/P EST LOW 20 MIN: CPT | Mod: 25

## 2025-03-06 PROCEDURE — 94727 GAS DIL/WSHOT DETER LNG VOL: CPT

## 2025-03-06 PROCEDURE — 94010 BREATHING CAPACITY TEST: CPT

## 2025-03-06 PROCEDURE — 94729 DIFFUSING CAPACITY: CPT

## 2025-03-07 RX ORDER — PIRFENIDONE 267 MG/1
267 CAPSULE ORAL
Qty: 270 | Refills: 2 | Status: ACTIVE | COMMUNITY
Start: 2025-03-07 | End: 1900-01-01

## 2025-04-08 ENCOUNTER — APPOINTMENT (OUTPATIENT)
Dept: HEART AND VASCULAR | Facility: CLINIC | Age: 80
End: 2025-04-08
Payer: MEDICARE

## 2025-04-08 ENCOUNTER — NON-APPOINTMENT (OUTPATIENT)
Age: 80
End: 2025-04-08

## 2025-04-08 VITALS
BODY MASS INDEX: 21.69 KG/M2 | HEART RATE: 77 BPM | DIASTOLIC BLOOD PRESSURE: 62 MMHG | SYSTOLIC BLOOD PRESSURE: 112 MMHG | HEIGHT: 66 IN | TEMPERATURE: 97.8 F | WEIGHT: 135 LBS | OXYGEN SATURATION: 95 %

## 2025-04-08 DIAGNOSIS — I25.10 ATHEROSCLEROTIC HEART DISEASE OF NATIVE CORONARY ARTERY W/OUT ANGINA PECTORIS: ICD-10-CM

## 2025-04-08 DIAGNOSIS — I51.9 HEART DISEASE, UNSPECIFIED: ICD-10-CM

## 2025-04-08 PROCEDURE — 99214 OFFICE O/P EST MOD 30 MIN: CPT

## 2025-04-08 PROCEDURE — G2211 COMPLEX E/M VISIT ADD ON: CPT

## 2025-04-08 PROCEDURE — 93000 ELECTROCARDIOGRAM COMPLETE: CPT

## 2025-05-06 ENCOUNTER — APPOINTMENT (OUTPATIENT)
Dept: PULMONOLOGY | Facility: CLINIC | Age: 80
End: 2025-05-06
Payer: MEDICARE

## 2025-05-06 VITALS
OXYGEN SATURATION: 95 % | DIASTOLIC BLOOD PRESSURE: 70 MMHG | TEMPERATURE: 97.7 F | SYSTOLIC BLOOD PRESSURE: 120 MMHG | HEART RATE: 70 BPM | BODY MASS INDEX: 22.18 KG/M2 | WEIGHT: 138 LBS | HEIGHT: 66 IN

## 2025-05-06 DIAGNOSIS — J84.9 INTERSTITIAL PULMONARY DISEASE, UNSPECIFIED: ICD-10-CM

## 2025-05-06 PROCEDURE — 36415 COLL VENOUS BLD VENIPUNCTURE: CPT

## 2025-05-06 PROCEDURE — G2211 COMPLEX E/M VISIT ADD ON: CPT

## 2025-05-06 PROCEDURE — 99213 OFFICE O/P EST LOW 20 MIN: CPT

## 2025-05-07 LAB
ALBUMIN SERPL ELPH-MCNC: 4.3 G/DL
ALP BLD-CCNC: 71 U/L
ALT SERPL-CCNC: 20 U/L
AST SERPL-CCNC: 27 U/L
BILIRUB DIRECT SERPL-MCNC: 0.1 MG/DL
BILIRUB INDIRECT SERPL-MCNC: 0.1 MG/DL
BILIRUB SERPL-MCNC: 0.2 MG/DL
PROT SERPL-MCNC: 7.5 G/DL

## 2025-05-29 ENCOUNTER — APPOINTMENT (OUTPATIENT)
Dept: HEART AND VASCULAR | Facility: CLINIC | Age: 80
End: 2025-05-29
Payer: MEDICARE

## 2025-05-29 ENCOUNTER — NON-APPOINTMENT (OUTPATIENT)
Age: 80
End: 2025-05-29

## 2025-05-29 VITALS
HEIGHT: 66 IN | BODY MASS INDEX: 22.18 KG/M2 | SYSTOLIC BLOOD PRESSURE: 118 MMHG | TEMPERATURE: 97.8 F | HEART RATE: 64 BPM | DIASTOLIC BLOOD PRESSURE: 72 MMHG | WEIGHT: 138 LBS | OXYGEN SATURATION: 95 %

## 2025-05-29 VITALS — SYSTOLIC BLOOD PRESSURE: 122 MMHG | DIASTOLIC BLOOD PRESSURE: 80 MMHG

## 2025-05-29 DIAGNOSIS — I73.9 PERIPHERAL VASCULAR DISEASE, UNSPECIFIED: ICD-10-CM

## 2025-05-29 DIAGNOSIS — I25.10 ATHEROSCLEROTIC HEART DISEASE OF NATIVE CORONARY ARTERY W/OUT ANGINA PECTORIS: ICD-10-CM

## 2025-05-29 PROCEDURE — G2211 COMPLEX E/M VISIT ADD ON: CPT

## 2025-05-29 PROCEDURE — 99214 OFFICE O/P EST MOD 30 MIN: CPT

## 2025-05-29 PROCEDURE — 36415 COLL VENOUS BLD VENIPUNCTURE: CPT

## 2025-05-29 PROCEDURE — 93000 ELECTROCARDIOGRAM COMPLETE: CPT

## 2025-05-29 RX ORDER — ROSUVASTATIN CALCIUM 10 MG/1
10 TABLET, FILM COATED ORAL
Qty: 90 | Refills: 3 | Status: ACTIVE | COMMUNITY
Start: 2025-05-29 | End: 1900-01-01

## 2025-05-30 LAB
ALBUMIN SERPL ELPH-MCNC: 4.4 G/DL
ALP BLD-CCNC: 75 U/L
ALT SERPL-CCNC: 21 U/L
ANION GAP SERPL CALC-SCNC: 16 MMOL/L
AST SERPL-CCNC: 31 U/L
BILIRUB SERPL-MCNC: 0.2 MG/DL
BUN SERPL-MCNC: 17 MG/DL
CALCIUM SERPL-MCNC: 10.2 MG/DL
CHLORIDE SERPL-SCNC: 101 MMOL/L
CK SERPL-CCNC: 88 U/L
CO2 SERPL-SCNC: 22 MMOL/L
CREAT SERPL-MCNC: 0.98 MG/DL
EGFRCR SERPLBLD CKD-EPI 2021: 78 ML/MIN/1.73M2
GLUCOSE SERPL-MCNC: 119 MG/DL
NT-PROBNP SERPL-MCNC: 374 PG/ML
POTASSIUM SERPL-SCNC: 4.3 MMOL/L
PROT SERPL-MCNC: 8.1 G/DL
SODIUM SERPL-SCNC: 138 MMOL/L

## 2025-05-30 RX ORDER — SACUBITRIL AND VALSARTAN 24; 26 MG/1; MG/1
24-26 TABLET, FILM COATED ORAL TWICE DAILY
Qty: 180 | Refills: 2 | Status: ACTIVE | COMMUNITY
Start: 2025-05-30 | End: 1900-01-01

## 2025-06-04 DIAGNOSIS — I51.9 HEART DISEASE, UNSPECIFIED: ICD-10-CM

## 2025-06-11 ENCOUNTER — RX RENEWAL (OUTPATIENT)
Age: 80
End: 2025-06-11

## 2025-06-17 ENCOUNTER — NON-APPOINTMENT (OUTPATIENT)
Age: 80
End: 2025-06-17

## 2025-06-19 ENCOUNTER — APPOINTMENT (OUTPATIENT)
Dept: HEART AND VASCULAR | Facility: CLINIC | Age: 80
End: 2025-06-19
Payer: MEDICARE

## 2025-06-19 PROBLEM — I49.3 PVC (PREMATURE VENTRICULAR CONTRACTION): Status: ACTIVE | Noted: 2025-06-19

## 2025-06-19 PROCEDURE — 78452 HT MUSCLE IMAGE SPECT MULT: CPT

## 2025-06-19 PROCEDURE — A9500: CPT

## 2025-06-19 PROCEDURE — 93015 CV STRESS TEST SUPVJ I&R: CPT

## 2025-06-20 ENCOUNTER — RX RENEWAL (OUTPATIENT)
Age: 80
End: 2025-06-20

## 2025-08-01 ENCOUNTER — APPOINTMENT (OUTPATIENT)
Dept: PULMONOLOGY | Facility: CLINIC | Age: 80
End: 2025-08-01
Payer: MEDICARE

## 2025-08-01 VITALS
WEIGHT: 139 LBS | HEIGHT: 66 IN | SYSTOLIC BLOOD PRESSURE: 120 MMHG | BODY MASS INDEX: 22.34 KG/M2 | HEART RATE: 67 BPM | OXYGEN SATURATION: 95 % | TEMPERATURE: 97.9 F | DIASTOLIC BLOOD PRESSURE: 60 MMHG

## 2025-08-01 DIAGNOSIS — Z23 ENCOUNTER FOR IMMUNIZATION: ICD-10-CM

## 2025-08-01 DIAGNOSIS — J84.9 INTERSTITIAL PULMONARY DISEASE, UNSPECIFIED: ICD-10-CM

## 2025-08-01 PROCEDURE — 99213 OFFICE O/P EST LOW 20 MIN: CPT

## 2025-08-01 PROCEDURE — G2211 COMPLEX E/M VISIT ADD ON: CPT

## 2025-08-01 PROCEDURE — 36415 COLL VENOUS BLD VENIPUNCTURE: CPT

## 2025-08-02 LAB
ALBUMIN SERPL ELPH-MCNC: 4.7 G/DL
ALP BLD-CCNC: 67 U/L
ALT SERPL-CCNC: 25 U/L
AST SERPL-CCNC: 32 U/L
BILIRUB DIRECT SERPL-MCNC: 0.1 MG/DL
BILIRUB INDIRECT SERPL-MCNC: 0.2 MG/DL
BILIRUB SERPL-MCNC: 0.3 MG/DL
PROT SERPL-MCNC: 7.6 G/DL